# Patient Record
Sex: MALE | Race: WHITE | NOT HISPANIC OR LATINO | Employment: OTHER | URBAN - METROPOLITAN AREA
[De-identification: names, ages, dates, MRNs, and addresses within clinical notes are randomized per-mention and may not be internally consistent; named-entity substitution may affect disease eponyms.]

---

## 2023-04-11 DIAGNOSIS — F41.9 ANXIETY: Primary | ICD-10-CM

## 2023-04-11 DIAGNOSIS — E78.5 DYSLIPIDEMIA: Primary | ICD-10-CM

## 2023-04-11 DIAGNOSIS — I10 PRIMARY HYPERTENSION: ICD-10-CM

## 2023-04-11 RX ORDER — FUROSEMIDE 40 MG/1
TABLET ORAL
Qty: 30 TABLET | Refills: 3 | Status: SHIPPED | OUTPATIENT
Start: 2023-04-11 | End: 2023-10-06 | Stop reason: ALTCHOICE

## 2023-04-11 RX ORDER — CITALOPRAM 40 MG/1
1 TABLET, FILM COATED ORAL DAILY
COMMUNITY
Start: 2021-03-04 | End: 2023-11-09 | Stop reason: SDUPTHER

## 2023-04-11 RX ORDER — SIMVASTATIN 20 MG/1
TABLET, FILM COATED ORAL
Qty: 90 TABLET | Refills: 3 | Status: SHIPPED | OUTPATIENT
Start: 2023-04-11 | End: 2023-12-14 | Stop reason: HOSPADM

## 2023-04-11 RX ORDER — CITALOPRAM 40 MG/1
TABLET, FILM COATED ORAL
Qty: 90 TABLET | Refills: 3 | Status: SHIPPED | OUTPATIENT
Start: 2023-04-11 | End: 2023-10-06 | Stop reason: ALTCHOICE

## 2023-04-11 RX ORDER — FUROSEMIDE 40 MG/1
40 TABLET ORAL
COMMUNITY
Start: 2021-11-09

## 2023-04-20 ENCOUNTER — OFFICE VISIT (OUTPATIENT)
Dept: PRIMARY CARE | Facility: CLINIC | Age: 88
End: 2023-04-20
Payer: MEDICARE

## 2023-04-20 VITALS
SYSTOLIC BLOOD PRESSURE: 124 MMHG | DIASTOLIC BLOOD PRESSURE: 84 MMHG | BODY MASS INDEX: 36.98 KG/M2 | WEIGHT: 273 LBS | HEIGHT: 72 IN | HEART RATE: 62 BPM | TEMPERATURE: 96.8 F | OXYGEN SATURATION: 99 %

## 2023-04-20 DIAGNOSIS — R60.0 BILATERAL LOWER EXTREMITY EDEMA: ICD-10-CM

## 2023-04-20 DIAGNOSIS — R53.82 CHRONIC FATIGUE: ICD-10-CM

## 2023-04-20 DIAGNOSIS — G37.9 DEMYELINATING DISEASE (MULTI): ICD-10-CM

## 2023-04-20 DIAGNOSIS — R53.1 WEAKNESS: ICD-10-CM

## 2023-04-20 DIAGNOSIS — I50.32 CHRONIC HEART FAILURE WITH PRESERVED EJECTION FRACTION (MULTI): ICD-10-CM

## 2023-04-20 DIAGNOSIS — E78.5 DYSLIPIDEMIA: ICD-10-CM

## 2023-04-20 DIAGNOSIS — J30.2 SEASONAL ALLERGIES: Primary | ICD-10-CM

## 2023-04-20 PROBLEM — R53.83 FATIGUE: Status: ACTIVE | Noted: 2023-04-20

## 2023-04-20 PROBLEM — H91.90 HEARING LOSS: Status: ACTIVE | Noted: 2023-04-20

## 2023-04-20 PROBLEM — I50.30 (HFPEF) HEART FAILURE WITH PRESERVED EJECTION FRACTION (MULTI): Status: ACTIVE | Noted: 2023-04-20

## 2023-04-20 PROBLEM — I48.92 ATRIAL FLUTTER (MULTI): Status: ACTIVE | Noted: 2023-04-20

## 2023-04-20 PROBLEM — R73.9 CHRONIC HYPERGLYCEMIA: Status: ACTIVE | Noted: 2023-04-20

## 2023-04-20 PROBLEM — F33.9 EPISODE OF RECURRENT MAJOR DEPRESSIVE DISORDER (CMS-HCC): Status: ACTIVE | Noted: 2023-04-20

## 2023-04-20 PROBLEM — R26.89 BALANCE PROBLEM: Status: ACTIVE | Noted: 2023-04-20

## 2023-04-20 PROCEDURE — 99214 OFFICE O/P EST MOD 30 MIN: CPT | Performed by: FAMILY MEDICINE

## 2023-04-20 RX ORDER — DILTIAZEM HYDROCHLORIDE 180 MG/1
1 TABLET, EXTENDED RELEASE ORAL DAILY
COMMUNITY
Start: 2022-03-28 | End: 2023-12-07 | Stop reason: ENTERED-IN-ERROR

## 2023-04-20 RX ORDER — ACETAMINOPHEN 500 MG
1 TABLET ORAL DAILY
Status: ON HOLD | COMMUNITY
End: 2023-12-08 | Stop reason: WASHOUT

## 2023-04-20 RX ORDER — GABAPENTIN 300 MG/1
1 CAPSULE ORAL 2 TIMES DAILY
COMMUNITY
Start: 2021-12-06 | End: 2023-11-09 | Stop reason: SDUPTHER

## 2023-04-20 RX ORDER — WARFARIN 2.5 MG/1
TABLET ORAL
COMMUNITY
Start: 2022-08-04 | End: 2023-10-06 | Stop reason: SDUPTHER

## 2023-04-20 RX ORDER — TRIAMCINOLONE ACETONIDE 40 MG/ML
60 INJECTION, SUSPENSION INTRA-ARTICULAR; INTRAMUSCULAR ONCE
Status: DISCONTINUED | OUTPATIENT
Start: 2023-04-20 | End: 2023-05-02

## 2023-04-20 RX ORDER — BUPROPION HYDROCHLORIDE 150 MG/1
1 TABLET ORAL DAILY
COMMUNITY
Start: 2022-04-14 | End: 2023-06-13

## 2023-04-20 ASSESSMENT — PAIN SCALES - GENERAL: PAINLEVEL: 0-NO PAIN

## 2023-04-20 NOTE — PROGRESS NOTES
Subjective   Patient ID: Melecio Whaley is a 87 y.o. male.    Patient comes in with his wife.  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  And deconditioning.  They would like a wheelchair.  They also would like a urinal he can use in bed because he is too weak to get out during the night and go.  He is due for basic labs.  He has significant seasonal allergies with a clear drippy nose and itchy eyes.  Over-the-counter's do not help and they have side effects.        Review of Systems   Constitutional:  Negative for fatigue, fever and unexpected weight change.   HENT:  Positive for postnasal drip, rhinorrhea, sinus pressure and sneezing. Negative for congestion, ear pain, hearing loss, sore throat and trouble swallowing.    Eyes:  Negative for pain and visual disturbance.   Respiratory:  Negative for cough and shortness of breath.    Cardiovascular:  Negative for chest pain, palpitations and leg swelling.   Gastrointestinal:  Negative for abdominal pain, blood in stool, diarrhea, nausea and vomiting.   Genitourinary:  Negative for dysuria, frequency, hematuria and urgency.   Musculoskeletal:  Negative for joint swelling.   Skin:  Negative for pallor and rash.   Neurological:  Positive for weakness. Negative for dizziness, syncope, numbness and headaches.   Psychiatric/Behavioral:  Negative for confusion, decreased concentration, hallucinations and suicidal ideas.      Vitals:    04/20/23 1355   BP: 124/84   Pulse: 62   Temp: 36 °C (96.8 °F)   SpO2: 99%      Objective   Physical Exam  Constitutional:       Appearance: Normal appearance. He is obese.   Cardiovascular:      Rate and Rhythm: Normal rate and regular rhythm.      Heart sounds: Normal heart sounds.   Pulmonary:      Effort: Pulmonary effort is normal.      Breath sounds: Normal breath sounds.   Musculoskeletal:      Cervical back: Neck supple.      Right lower leg: Edema  present.      Left lower leg: Edema present.   Skin:     General: Skin is warm and dry.   Neurological:      General: No focal deficit present.      Mental Status: He is alert.      Motor: Weakness present.      Gait: Gait abnormal.   Psychiatric:         Mood and Affect: Mood normal.         Speech: Speech normal.         Behavior: Behavior normal.         Cognition and Memory: Cognition normal.         Assessment/Plan   There are no diagnoses linked to this encounter.

## 2023-05-01 ASSESSMENT — ENCOUNTER SYMPTOMS
UNEXPECTED WEIGHT CHANGE: 0
DIZZINESS: 0
PALPITATIONS: 0
CONFUSION: 0
HALLUCINATIONS: 0
SINUS PRESSURE: 1
DECREASED CONCENTRATION: 0
SORE THROAT: 0
ABDOMINAL PAIN: 0
COUGH: 0
NAUSEA: 0
JOINT SWELLING: 0
DYSURIA: 0
EYE PAIN: 0
SHORTNESS OF BREATH: 0
FATIGUE: 0
WEAKNESS: 1
RHINORRHEA: 1
FEVER: 0
VOMITING: 0
BLOOD IN STOOL: 0
HEADACHES: 0
FREQUENCY: 0
DIARRHEA: 0
TROUBLE SWALLOWING: 0
HEMATURIA: 0
NUMBNESS: 0

## 2023-05-01 NOTE — PATIENT INSTRUCTIONS
Reviewed current issues.  I feel he would benefit from a wheelchair as he is a very high fall risk.  We also discussed keeping his balance good and keeping strong.  I highly recommend he do physical therapy and work on his strength at home as well.  We will get him a urinal so he does not get out of bed and fall during the night.  We will give him a Kenalog shot to hopefully help his allergies since I do not want him on over-the-counter medications due to side effects.  We will follow-up in 3 months to assess his improvement.  Get fasting labs.

## 2023-05-04 ENCOUNTER — LAB (OUTPATIENT)
Dept: LAB | Facility: LAB | Age: 88
End: 2023-05-04
Payer: MEDICARE

## 2023-05-04 DIAGNOSIS — E78.5 DYSLIPIDEMIA: ICD-10-CM

## 2023-05-04 DIAGNOSIS — J30.2 SEASONAL ALLERGIES: ICD-10-CM

## 2023-05-04 LAB
ALANINE AMINOTRANSFERASE (SGPT) (U/L) IN SER/PLAS: 10 U/L (ref 10–52)
ALBUMIN (G/DL) IN SER/PLAS: 3.5 G/DL (ref 3.4–5)
ALKALINE PHOSPHATASE (U/L) IN SER/PLAS: 52 U/L (ref 33–136)
ANION GAP IN SER/PLAS: 13 MMOL/L (ref 10–20)
ASPARTATE AMINOTRANSFERASE (SGOT) (U/L) IN SER/PLAS: 15 U/L (ref 9–39)
BASOPHILS (10*3/UL) IN BLOOD BY AUTOMATED COUNT: 0.07 X10E9/L (ref 0–0.1)
BASOPHILS/100 LEUKOCYTES IN BLOOD BY AUTOMATED COUNT: 0.7 % (ref 0–2)
BILIRUBIN TOTAL (MG/DL) IN SER/PLAS: 0.7 MG/DL (ref 0–1.2)
CALCIUM (MG/DL) IN SER/PLAS: 8.6 MG/DL (ref 8.6–10.3)
CARBON DIOXIDE, TOTAL (MMOL/L) IN SER/PLAS: 28 MMOL/L (ref 21–32)
CHLORIDE (MMOL/L) IN SER/PLAS: 104 MMOL/L (ref 98–107)
CHOLESTEROL (MG/DL) IN SER/PLAS: 134 MG/DL (ref 0–199)
CHOLESTEROL IN HDL (MG/DL) IN SER/PLAS: 37.5 MG/DL
CHOLESTEROL/HDL RATIO: 3.6
CREATININE (MG/DL) IN SER/PLAS: 1.44 MG/DL (ref 0.5–1.3)
EOSINOPHILS (10*3/UL) IN BLOOD BY AUTOMATED COUNT: 0.32 X10E9/L (ref 0–0.4)
EOSINOPHILS/100 LEUKOCYTES IN BLOOD BY AUTOMATED COUNT: 3.3 % (ref 0–6)
ERYTHROCYTE DISTRIBUTION WIDTH (RATIO) BY AUTOMATED COUNT: 15.7 % (ref 11.5–14.5)
ERYTHROCYTE MEAN CORPUSCULAR HEMOGLOBIN CONCENTRATION (G/DL) BY AUTOMATED: 31.2 G/DL (ref 32–36)
ERYTHROCYTE MEAN CORPUSCULAR VOLUME (FL) BY AUTOMATED COUNT: 96 FL (ref 80–100)
ERYTHROCYTES (10*6/UL) IN BLOOD BY AUTOMATED COUNT: 5.33 X10E12/L (ref 4.5–5.9)
GFR MALE: 48 ML/MIN/1.73M2
GLUCOSE (MG/DL) IN SER/PLAS: 78 MG/DL (ref 74–99)
HEMATOCRIT (%) IN BLOOD BY AUTOMATED COUNT: 51 % (ref 41–52)
HEMOGLOBIN (G/DL) IN BLOOD: 15.9 G/DL (ref 13.5–17.5)
IMMATURE GRANULOCYTES/100 LEUKOCYTES IN BLOOD BY AUTOMATED COUNT: 0.5 % (ref 0–0.9)
LDL: 82 MG/DL (ref 0–99)
LEUKOCYTES (10*3/UL) IN BLOOD BY AUTOMATED COUNT: 9.7 X10E9/L (ref 4.4–11.3)
LYMPHOCYTES (10*3/UL) IN BLOOD BY AUTOMATED COUNT: 0.89 X10E9/L (ref 0.8–3)
LYMPHOCYTES/100 LEUKOCYTES IN BLOOD BY AUTOMATED COUNT: 9.2 % (ref 13–44)
MONOCYTES (10*3/UL) IN BLOOD BY AUTOMATED COUNT: 0.8 X10E9/L (ref 0.05–0.8)
MONOCYTES/100 LEUKOCYTES IN BLOOD BY AUTOMATED COUNT: 8.3 % (ref 2–10)
NEUTROPHILS (10*3/UL) IN BLOOD BY AUTOMATED COUNT: 7.56 X10E9/L (ref 1.6–5.5)
NEUTROPHILS/100 LEUKOCYTES IN BLOOD BY AUTOMATED COUNT: 78 % (ref 40–80)
PLATELETS (10*3/UL) IN BLOOD AUTOMATED COUNT: 247 X10E9/L (ref 150–450)
POTASSIUM (MMOL/L) IN SER/PLAS: 4.6 MMOL/L (ref 3.5–5.3)
PROTEIN TOTAL: 6.2 G/DL (ref 6.4–8.2)
SODIUM (MMOL/L) IN SER/PLAS: 140 MMOL/L (ref 136–145)
TRIGLYCERIDE (MG/DL) IN SER/PLAS: 71 MG/DL (ref 0–149)
UREA NITROGEN (MG/DL) IN SER/PLAS: 21 MG/DL (ref 6–23)
VLDL: 14 MG/DL (ref 0–40)

## 2023-05-04 PROCEDURE — 36415 COLL VENOUS BLD VENIPUNCTURE: CPT

## 2023-05-04 PROCEDURE — 85025 COMPLETE CBC W/AUTO DIFF WBC: CPT

## 2023-05-04 PROCEDURE — 80061 LIPID PANEL: CPT

## 2023-05-04 PROCEDURE — 80053 COMPREHEN METABOLIC PANEL: CPT

## 2023-06-10 DIAGNOSIS — I48.92 UNSPECIFIED ATRIAL FLUTTER (MULTI): ICD-10-CM

## 2023-06-12 PROBLEM — I48.0 PAROXYSMAL ATRIAL FIBRILLATION (MULTI): Status: ACTIVE | Noted: 2023-06-12

## 2023-06-12 PROBLEM — E66.9 OBESITY, CLASS II, BMI 35-39.9: Status: ACTIVE | Noted: 2020-07-16

## 2023-06-12 PROBLEM — S31.819A WOUND OF RIGHT BUTTOCK: Status: ACTIVE | Noted: 2023-06-12

## 2023-06-12 PROBLEM — N39.41 URGE INCONTINENCE OF URINE: Status: ACTIVE | Noted: 2017-08-03

## 2023-06-12 PROBLEM — G47.30 SLEEP APNEA: Status: ACTIVE | Noted: 2017-08-03

## 2023-06-12 PROBLEM — R32 INCONTINENCE OF URINE: Status: ACTIVE | Noted: 2023-06-12

## 2023-06-12 PROBLEM — R06.01 ORTHOPNEA: Status: ACTIVE | Noted: 2023-06-12

## 2023-06-12 PROBLEM — K92.1 MELENA: Status: ACTIVE | Noted: 2023-06-12

## 2023-06-12 PROBLEM — C44.90 MALIGNANT NEOPLASM OF SKIN: Status: ACTIVE | Noted: 2017-08-01

## 2023-06-12 PROBLEM — E78.5 HYPERLIPIDEMIA: Status: ACTIVE | Noted: 2017-08-03

## 2023-06-12 PROBLEM — G70.00 MYASTHENIA GRAVIS (MULTI): Status: ACTIVE | Noted: 2020-07-16

## 2023-06-12 PROBLEM — Z85.46 HISTORY OF PROSTATE CANCER: Status: ACTIVE | Noted: 2020-07-16

## 2023-06-12 PROBLEM — R41.81 AGE-RELATED COGNITIVE DECLINE: Status: ACTIVE | Noted: 2021-03-04

## 2023-06-12 PROBLEM — R15.9 INCONTINENCE OF FECES: Status: ACTIVE | Noted: 2021-03-04

## 2023-06-12 PROBLEM — R60.0 PERIPHERAL EDEMA: Status: ACTIVE | Noted: 2020-07-16

## 2023-06-12 PROBLEM — F41.9 ANXIETY: Status: ACTIVE | Noted: 2017-08-03

## 2023-06-12 PROBLEM — R06.09 DYSPNEA ON EXERTION: Status: ACTIVE | Noted: 2023-06-12

## 2023-06-12 PROBLEM — D49.59 NEOPLASM OF PROSTATE: Status: ACTIVE | Noted: 2017-08-03

## 2023-06-12 PROBLEM — R16.1 SPLENOMEGALY: Status: ACTIVE | Noted: 2023-06-12

## 2023-06-12 PROBLEM — I10 HYPERTENSION: Status: ACTIVE | Noted: 2017-08-03

## 2023-06-12 PROBLEM — R19.7 DIARRHEA: Status: ACTIVE | Noted: 2021-03-04

## 2023-06-12 PROBLEM — E66.812 OBESITY, CLASS II, BMI 35-39.9: Status: ACTIVE | Noted: 2020-07-16

## 2023-06-12 PROBLEM — E55.9 VITAMIN D DEFICIENCY: Status: ACTIVE | Noted: 2017-08-03

## 2023-06-12 PROBLEM — R60.9 PERIPHERAL EDEMA: Status: ACTIVE | Noted: 2020-07-16

## 2023-06-12 PROBLEM — G60.9 IDIOPATHIC PERIPHERAL NEUROPATHY: Status: ACTIVE | Noted: 2023-06-12

## 2023-06-12 PROBLEM — R26.81 UNSTEADY GAIT: Status: ACTIVE | Noted: 2020-07-16

## 2023-06-12 PROBLEM — R10.12 INTERMITTENT LEFT UPPER QUADRANT ABDOMINAL PAIN: Status: ACTIVE | Noted: 2023-06-12

## 2023-06-12 PROBLEM — C61 PROSTATE CANCER (MULTI): Status: ACTIVE | Noted: 2023-06-12

## 2023-06-12 PROBLEM — G62.9 NEUROPATHY: Status: ACTIVE | Noted: 2017-08-03

## 2023-06-12 PROBLEM — R29.6 RECURRENT FALLS: Status: ACTIVE | Noted: 2020-07-16

## 2023-06-12 PROBLEM — I48.19 PERSISTENT ATRIAL FIBRILLATION (MULTI): Status: ACTIVE | Noted: 2017-08-03

## 2023-06-12 PROBLEM — G37.9 DEMYELINATING DISEASE OF CENTRAL NERVOUS SYSTEM (MULTI): Status: ACTIVE | Noted: 2021-05-25

## 2023-06-12 PROBLEM — Z85.828 HISTORY OF SQUAMOUS CELL CARCINOMA OF SKIN: Status: ACTIVE | Noted: 2020-07-16

## 2023-06-12 PROBLEM — R06.02 SHORTNESS OF BREATH ON EXERTION: Status: ACTIVE | Noted: 2023-06-12

## 2023-06-12 RX ORDER — UBIDECARENONE 75 MG
1000 CAPSULE ORAL DAILY
COMMUNITY

## 2023-06-12 RX ORDER — OXYBUTYNIN CHLORIDE 5 MG/1
10 TABLET ORAL 2 TIMES DAILY
COMMUNITY
Start: 2017-06-13 | End: 2023-12-14 | Stop reason: HOSPADM

## 2023-06-12 RX ORDER — WARFARIN SODIUM 5 MG/1
TABLET ORAL
COMMUNITY
Start: 2017-07-09 | End: 2023-10-06 | Stop reason: SDUPTHER

## 2023-06-12 RX ORDER — DILTIAZEM HYDROCHLORIDE EXTENDED-RELEASE TABLETS 240 MG/1
240 TABLET, EXTENDED RELEASE ORAL
COMMUNITY
Start: 2021-05-19 | End: 2023-12-07 | Stop reason: ENTERED-IN-ERROR

## 2023-06-12 RX ORDER — ACETAMINOPHEN 500 MG
1000 TABLET ORAL 2 TIMES DAILY
Status: ON HOLD | COMMUNITY
Start: 2021-06-15 | End: 2023-12-08 | Stop reason: WASHOUT

## 2023-06-12 RX ORDER — ACETAMINOPHEN 500 MG
1 TABLET ORAL DAILY
COMMUNITY
End: 2023-10-06 | Stop reason: ALTCHOICE

## 2023-06-12 RX ORDER — DOFETILIDE 0.5 MG/1
500 CAPSULE ORAL DAILY
Status: ON HOLD | COMMUNITY
Start: 2021-05-19 | End: 2023-12-11 | Stop reason: WASHOUT

## 2023-06-12 RX ORDER — ALPRAZOLAM 1 MG/1
1 TABLET ORAL 3 TIMES DAILY PRN
Status: ON HOLD | COMMUNITY
End: 2023-12-08 | Stop reason: WASHOUT

## 2023-06-13 ENCOUNTER — OFFICE VISIT (OUTPATIENT)
Dept: PRIMARY CARE | Facility: CLINIC | Age: 88
End: 2023-06-13
Payer: MEDICARE

## 2023-06-13 VITALS
TEMPERATURE: 97.3 F | WEIGHT: 242 LBS | HEART RATE: 63 BPM | OXYGEN SATURATION: 98 % | BODY MASS INDEX: 32.78 KG/M2 | SYSTOLIC BLOOD PRESSURE: 130 MMHG | HEIGHT: 72 IN | DIASTOLIC BLOOD PRESSURE: 70 MMHG

## 2023-06-13 DIAGNOSIS — R60.0 BILATERAL LOWER EXTREMITY EDEMA: ICD-10-CM

## 2023-06-13 DIAGNOSIS — I48.0 PAROXYSMAL ATRIAL FIBRILLATION (MULTI): ICD-10-CM

## 2023-06-13 DIAGNOSIS — C61 PROSTATE CANCER (MULTI): ICD-10-CM

## 2023-06-13 DIAGNOSIS — R53.1 WEAKNESS: ICD-10-CM

## 2023-06-13 DIAGNOSIS — F33.1 MODERATE EPISODE OF RECURRENT MAJOR DEPRESSIVE DISORDER (MULTI): ICD-10-CM

## 2023-06-13 DIAGNOSIS — I10 PRIMARY HYPERTENSION: Primary | ICD-10-CM

## 2023-06-13 DIAGNOSIS — R26.81 UNSTEADY GAIT: ICD-10-CM

## 2023-06-13 DIAGNOSIS — R60.9 PERIPHERAL EDEMA: ICD-10-CM

## 2023-06-13 DIAGNOSIS — N39.41 URGE INCONTINENCE OF URINE: ICD-10-CM

## 2023-06-13 DIAGNOSIS — R41.81 AGE-RELATED COGNITIVE DECLINE: ICD-10-CM

## 2023-06-13 PROCEDURE — 3078F DIAST BP <80 MM HG: CPT | Performed by: FAMILY MEDICINE

## 2023-06-13 PROCEDURE — 99214 OFFICE O/P EST MOD 30 MIN: CPT | Performed by: FAMILY MEDICINE

## 2023-06-13 PROCEDURE — 3075F SYST BP GE 130 - 139MM HG: CPT | Performed by: FAMILY MEDICINE

## 2023-06-13 RX ORDER — BUPROPION HYDROCHLORIDE 150 MG/1
TABLET ORAL
Qty: 90 TABLET | Refills: 3 | Status: SHIPPED | OUTPATIENT
Start: 2023-06-13 | End: 2023-12-14 | Stop reason: HOSPADM

## 2023-06-13 ASSESSMENT — PAIN SCALES - GENERAL: PAINLEVEL: 8

## 2023-06-13 NOTE — PATIENT INSTRUCTIONS
It was nice to see you today!  Discussed current concerns and addressed   Reviewed recent labs and diagnostics  Reviewed medications list  Continue to eat a healthy diet, exercise at least 3 times a week or more  Plan and follow up discussed  For any further information related to your condition, copy and paste or go to [email or web address]    Try voltaren gel, rub on knee  Put ACE wrap on it  Try ice not heat

## 2023-06-19 PROBLEM — D49.59 NEOPLASM OF PROSTATE: Status: RESOLVED | Noted: 2017-08-03 | Resolved: 2023-06-19

## 2023-06-19 PROBLEM — R60.9 PERIPHERAL EDEMA: Status: RESOLVED | Noted: 2020-07-16 | Resolved: 2023-06-19

## 2023-06-19 PROBLEM — R60.0 PERIPHERAL EDEMA: Status: RESOLVED | Noted: 2020-07-16 | Resolved: 2023-06-19

## 2023-06-19 PROBLEM — S31.819A WOUND OF RIGHT BUTTOCK: Status: RESOLVED | Noted: 2023-06-12 | Resolved: 2023-06-19

## 2023-06-19 PROBLEM — R32 INCONTINENCE OF URINE: Status: RESOLVED | Noted: 2023-06-12 | Resolved: 2023-06-19

## 2023-06-19 ASSESSMENT — ENCOUNTER SYMPTOMS
JOINT SWELLING: 0
FEVER: 0
ACTIVITY CHANGE: 1
EYE PAIN: 0
COUGH: 0
NAUSEA: 0
DYSURIA: 0
HEMATURIA: 0
ARTHRALGIAS: 1
BLOOD IN STOOL: 0
ABDOMINAL PAIN: 0
SORE THROAT: 0
FATIGUE: 1
DIARRHEA: 1
VOMITING: 0
FREQUENCY: 0
UNEXPECTED WEIGHT CHANGE: 0
DIZZINESS: 0
NERVOUS/ANXIOUS: 1
PALPITATIONS: 0
TROUBLE SWALLOWING: 0
HALLUCINATIONS: 0
CONFUSION: 1
HEADACHES: 0
DECREASED CONCENTRATION: 0
NUMBNESS: 0
WEAKNESS: 1
SHORTNESS OF BREATH: 0

## 2023-06-19 NOTE — PROGRESS NOTES
Subjective   Patient ID: Melecio Whaley is a 87 y.o. male.    Patient comes in for follow-up.  He also has a new hip pain.  His balance is off and we discussed getting him into physical therapy.  He has A-fib and hypertension.  He has dementia.  He has had a history of prostate cancer.  He is urinating well.  We reviewed medications and recent blood work.  He had mild renal insufficiency in the last few blood draws.  He does not drink enough water admittedly.  PSA less than a year ago was undetectable.        Review of Systems   Constitutional:  Positive for activity change and fatigue. Negative for fever and unexpected weight change.   HENT:  Positive for hearing loss. Negative for congestion, ear pain, sore throat and trouble swallowing.    Eyes:  Negative for pain and visual disturbance.   Respiratory:  Negative for cough and shortness of breath.    Cardiovascular:  Positive for leg swelling. Negative for chest pain and palpitations.   Gastrointestinal:  Positive for diarrhea. Negative for abdominal pain, blood in stool, nausea and vomiting.   Genitourinary:  Negative for dysuria, frequency, hematuria and urgency.   Musculoskeletal:  Positive for arthralgias. Negative for joint swelling.   Skin:  Negative for pallor and rash.   Neurological:  Positive for weakness. Negative for dizziness, syncope, numbness and headaches.   Psychiatric/Behavioral:  Positive for confusion. Negative for decreased concentration, hallucinations and suicidal ideas. The patient is nervous/anxious.      Vitals:    06/13/23 1503   BP: 130/70   Pulse: 63   Temp: 36.3 °C (97.3 °F)   SpO2: 98%      Objective   Physical Exam    Assessment/Plan   Diagnoses and all orders for this visit:  Primary hypertension  Paroxysmal atrial fibrillation (CMS/HCC)

## 2023-10-05 ENCOUNTER — TELEPHONE (OUTPATIENT)
Dept: PRIMARY CARE | Facility: CLINIC | Age: 88
End: 2023-10-05
Payer: MEDICARE

## 2023-10-05 NOTE — TELEPHONE ENCOUNTER
States Melecio is out of refills. Needing coumadin, if needing appt, will need to call his wife-Jemma [phone]    Jemma stats he has a 5mg bottle that has him taking 1 tablet on Tues, Thur, Sat, Sun.     2mg bottle states as directed but no days on them. Patient is out of both bottles.     Please advise how you want him to take it.

## 2023-10-06 DIAGNOSIS — I48.3 TYPICAL ATRIAL FLUTTER (MULTI): Primary | ICD-10-CM

## 2023-10-06 RX ORDER — WARFARIN 2.5 MG/1
TABLET ORAL
Qty: 24 TABLET | Refills: 3 | Status: SHIPPED | OUTPATIENT
Start: 2023-10-06 | End: 2023-11-09 | Stop reason: SDUPTHER

## 2023-10-06 RX ORDER — WARFARIN SODIUM 5 MG/1
TABLET ORAL
Qty: 48 TABLET | Refills: 3 | Status: SHIPPED | OUTPATIENT
Start: 2023-10-06 | End: 2023-11-10 | Stop reason: SDUPTHER

## 2023-10-06 NOTE — TELEPHONE ENCOUNTER
Wife made aware, Dr. Taylor called in both doses with how to take them. Please keep in a pill organizer. Recheck INR in one week

## 2023-10-06 NOTE — PROGRESS NOTES
Subjective   Patient ID: Melecio Whaley is a 87 y.o. male.    HPI    Review of Systems  There were no vitals filed for this visit.   Objective   Physical Exam    Assessment/Plan   There are no diagnoses linked to this encounter.

## 2023-10-30 ENCOUNTER — APPOINTMENT (OUTPATIENT)
Dept: PRIMARY CARE | Facility: CLINIC | Age: 88
End: 2023-10-30
Payer: MEDICARE

## 2023-11-09 ENCOUNTER — OFFICE VISIT (OUTPATIENT)
Dept: PRIMARY CARE | Facility: CLINIC | Age: 88
End: 2023-11-09
Payer: MEDICARE

## 2023-11-09 ENCOUNTER — LAB (OUTPATIENT)
Dept: LAB | Facility: LAB | Age: 88
End: 2023-11-09
Payer: MEDICARE

## 2023-11-09 VITALS
TEMPERATURE: 96.8 F | OXYGEN SATURATION: 97 % | DIASTOLIC BLOOD PRESSURE: 98 MMHG | SYSTOLIC BLOOD PRESSURE: 142 MMHG | HEART RATE: 77 BPM

## 2023-11-09 DIAGNOSIS — I48.4 ATYPICAL ATRIAL FLUTTER (MULTI): ICD-10-CM

## 2023-11-09 DIAGNOSIS — I48.19 PERSISTENT ATRIAL FIBRILLATION (MULTI): Primary | ICD-10-CM

## 2023-11-09 DIAGNOSIS — I50.32 CHRONIC HEART FAILURE WITH PRESERVED EJECTION FRACTION (MULTI): ICD-10-CM

## 2023-11-09 DIAGNOSIS — R41.81 AGE-RELATED COGNITIVE DECLINE: ICD-10-CM

## 2023-11-09 DIAGNOSIS — F33.1 MODERATE EPISODE OF RECURRENT MAJOR DEPRESSIVE DISORDER (MULTI): ICD-10-CM

## 2023-11-09 DIAGNOSIS — I48.3 TYPICAL ATRIAL FLUTTER (MULTI): ICD-10-CM

## 2023-11-09 DIAGNOSIS — I48.19 PERSISTENT ATRIAL FIBRILLATION (MULTI): ICD-10-CM

## 2023-11-09 PROBLEM — G37.9 DEMYELINATING DISEASE OF CENTRAL NERVOUS SYSTEM (MULTI): Status: RESOLVED | Noted: 2021-05-25 | Resolved: 2023-11-09

## 2023-11-09 PROBLEM — G70.00 MYASTHENIA GRAVIS (MULTI): Status: RESOLVED | Noted: 2020-07-16 | Resolved: 2023-11-09

## 2023-11-09 PROBLEM — I48.0 PAROXYSMAL ATRIAL FIBRILLATION (MULTI): Status: RESOLVED | Noted: 2023-06-12 | Resolved: 2023-11-09

## 2023-11-09 PROBLEM — C61 MALIGNANT NEOPLASM OF PROSTATE (MULTI): Status: ACTIVE | Noted: 2021-05-25

## 2023-11-09 LAB
ALBUMIN SERPL BCP-MCNC: 3.4 G/DL (ref 3.4–5)
ALP SERPL-CCNC: 54 U/L (ref 33–136)
ALT SERPL W P-5'-P-CCNC: 12 U/L (ref 10–52)
ANION GAP SERPL CALC-SCNC: 14 MMOL/L (ref 10–20)
AST SERPL W P-5'-P-CCNC: 16 U/L (ref 9–39)
BASOPHILS # BLD AUTO: 0.07 X10*3/UL (ref 0–0.1)
BASOPHILS NFR BLD AUTO: 0.7 %
BILIRUB SERPL-MCNC: 0.7 MG/DL (ref 0–1.2)
BUN SERPL-MCNC: 29 MG/DL (ref 6–23)
CALCIUM SERPL-MCNC: 8.7 MG/DL (ref 8.6–10.3)
CHLORIDE SERPL-SCNC: 104 MMOL/L (ref 98–107)
CO2 SERPL-SCNC: 28 MMOL/L (ref 21–32)
CREAT SERPL-MCNC: 0.96 MG/DL (ref 0.5–1.3)
EOSINOPHIL # BLD AUTO: 0.24 X10*3/UL (ref 0–0.4)
EOSINOPHIL NFR BLD AUTO: 2.5 %
ERYTHROCYTE [DISTWIDTH] IN BLOOD BY AUTOMATED COUNT: 15.9 % (ref 11.5–14.5)
GFR SERPL CREATININE-BSD FRML MDRD: 77 ML/MIN/1.73M*2
GLUCOSE SERPL-MCNC: 68 MG/DL (ref 74–99)
HCT VFR BLD AUTO: 48.1 % (ref 41–52)
HGB BLD-MCNC: 14.9 G/DL (ref 13.5–17.5)
IMM GRANULOCYTES # BLD AUTO: 0.03 X10*3/UL (ref 0–0.5)
IMM GRANULOCYTES NFR BLD AUTO: 0.3 % (ref 0–0.9)
INR PPP: 1.6 (ref 0.9–1.1)
LYMPHOCYTES # BLD AUTO: 0.83 X10*3/UL (ref 0.8–3)
LYMPHOCYTES NFR BLD AUTO: 8.5 %
MCH RBC QN AUTO: 30 PG (ref 26–34)
MCHC RBC AUTO-ENTMCNC: 31 G/DL (ref 32–36)
MCV RBC AUTO: 97 FL (ref 80–100)
MONOCYTES # BLD AUTO: 0.74 X10*3/UL (ref 0.05–0.8)
MONOCYTES NFR BLD AUTO: 7.6 %
NEUTROPHILS # BLD AUTO: 7.83 X10*3/UL (ref 1.6–5.5)
NEUTROPHILS NFR BLD AUTO: 80.4 %
NRBC BLD-RTO: 0 /100 WBCS (ref 0–0)
PLATELET # BLD AUTO: 225 X10*3/UL (ref 150–450)
POTASSIUM SERPL-SCNC: 4.9 MMOL/L (ref 3.5–5.3)
PROT SERPL-MCNC: 6.4 G/DL (ref 6.4–8.2)
PROTHROMBIN TIME: 18.6 SECONDS (ref 9.8–12.8)
RBC # BLD AUTO: 4.97 X10*6/UL (ref 4.5–5.9)
SODIUM SERPL-SCNC: 141 MMOL/L (ref 136–145)
TSH SERPL-ACNC: 1.63 MIU/L (ref 0.44–3.98)
WBC # BLD AUTO: 9.7 X10*3/UL (ref 4.4–11.3)

## 2023-11-09 PROCEDURE — 80053 COMPREHEN METABOLIC PANEL: CPT

## 2023-11-09 PROCEDURE — 1159F MED LIST DOCD IN RCRD: CPT | Performed by: FAMILY MEDICINE

## 2023-11-09 PROCEDURE — 85025 COMPLETE CBC W/AUTO DIFF WBC: CPT

## 2023-11-09 PROCEDURE — 99214 OFFICE O/P EST MOD 30 MIN: CPT | Performed by: FAMILY MEDICINE

## 2023-11-09 PROCEDURE — 3080F DIAST BP >= 90 MM HG: CPT | Performed by: FAMILY MEDICINE

## 2023-11-09 PROCEDURE — 1125F AMNT PAIN NOTED PAIN PRSNT: CPT | Performed by: FAMILY MEDICINE

## 2023-11-09 PROCEDURE — 3077F SYST BP >= 140 MM HG: CPT | Performed by: FAMILY MEDICINE

## 2023-11-09 PROCEDURE — 84443 ASSAY THYROID STIM HORMONE: CPT

## 2023-11-09 PROCEDURE — 85610 PROTHROMBIN TIME: CPT

## 2023-11-09 PROCEDURE — 36415 COLL VENOUS BLD VENIPUNCTURE: CPT

## 2023-11-09 PROCEDURE — 83880 ASSAY OF NATRIURETIC PEPTIDE: CPT

## 2023-11-09 RX ORDER — CITALOPRAM 40 MG/1
40 TABLET, FILM COATED ORAL DAILY
Qty: 90 TABLET | Refills: 3 | Status: SHIPPED | OUTPATIENT
Start: 2023-11-09 | End: 2023-11-09 | Stop reason: SDUPTHER

## 2023-11-09 RX ORDER — GABAPENTIN 300 MG/1
300 CAPSULE ORAL 2 TIMES DAILY
Qty: 180 CAPSULE | Refills: 3 | Status: SHIPPED | OUTPATIENT
Start: 2023-11-09 | End: 2023-11-09 | Stop reason: SDUPTHER

## 2023-11-09 NOTE — PROGRESS NOTES
Subjective   Patient ID: Melecio Whaley is a 87 y.o. male.    Patient brought in by son.  He has multiple problems including history of heart failure with preserved ejection fraction, atrial fibrillation on warfarin, history of prostate cancer and depression.  There is some mention of MS when he was in his 20s but he said it went away.  Nonetheless he is here because his breathing has gotten a little worse.  He has been on his oxygen at home considerably more.  There is no fever, chills, cough or any other unusual symptoms.  He is with his son and they are very unclear about which medications he is actually on.  Blood pressure is elevated today.  He has no chest pain.  He has no palpitations.  To note, his wife is in a nursing facility after breaking her hip.  He Nathalie little upset over not seeing her.  He is in a wheelchair.  He has problems with mobility.  The children are trying to figure out a long-term plan with regards to assisted living versus nursing facility.  Patient does not smoke.        Review of Systems   Constitutional:  Positive for fatigue. Negative for fever and unexpected weight change.   HENT:  Negative for congestion, ear pain, hearing loss, sore throat and trouble swallowing.    Eyes:  Negative for pain and visual disturbance.   Respiratory:  Positive for shortness of breath. Negative for cough.         No orthopnea.  He does not weigh himself daily and the swelling in his legs has not changed.   Cardiovascular:  Positive for leg swelling. Negative for chest pain and palpitations.   Gastrointestinal:  Negative for abdominal pain, blood in stool, diarrhea, nausea and vomiting.   Genitourinary:  Negative for dysuria, frequency, hematuria and urgency.        He is incontinent   Musculoskeletal:  Positive for arthralgias and gait problem. Negative for joint swelling.   Skin:  Negative for pallor and rash.   Neurological:  Negative for dizziness, syncope, weakness, numbness and headaches.    Psychiatric/Behavioral:  Positive for dysphoric mood. Negative for confusion, decreased concentration, hallucinations and suicidal ideas.      Vitals:    11/09/23 1255   BP: (!) 142/98   Pulse: 77   Temp: 36 °C (96.8 °F)   SpO2: 97%      Objective   Physical Exam  Constitutional:       General: He is not in acute distress.     Appearance: He is obese. He is not ill-appearing or toxic-appearing.   HENT:      Head: Normocephalic and atraumatic.      Nose: No congestion.      Mouth/Throat:      Mouth: Mucous membranes are moist.      Pharynx: Oropharynx is clear.   Eyes:      Extraocular Movements: Extraocular movements intact.      Pupils: Pupils are equal, round, and reactive to light.   Cardiovascular:      Rate and Rhythm: Normal rate. Rhythm irregular.   Pulmonary:      Effort: Pulmonary effort is normal.      Breath sounds: Rales present.      Comments: Faint bibasilar Rales  Musculoskeletal:         General: No swelling.      Cervical back: Neck supple.      Right lower leg: Edema present.      Left lower leg: Edema present.   Skin:     General: Skin is warm and dry.   Neurological:      General: No focal deficit present.      Mental Status: He is alert.   Psychiatric:         Mood and Affect: Mood normal.         Speech: Speech normal.         Behavior: Behavior normal.         Cognition and Memory: Cognition normal.         Assessment/Plan   Diagnoses and all orders for this visit:  Persistent atrial fibrillation (CMS/HCC)  -     CBC and Auto Differential; Future  -     Comprehensive Metabolic Panel; Future  -     Thyroid Stimulating Hormone; Future  -     B-type natriuretic peptide; Future  Atypical atrial flutter (CMS/HCC)  -     CBC and Auto Differential; Future  -     Comprehensive Metabolic Panel; Future  -     Thyroid Stimulating Hormone; Future  -     B-type natriuretic peptide; Future  Chronic heart failure with preserved ejection fraction (CMS/HCC)  -     CBC and Auto Differential; Future  -      Comprehensive Metabolic Panel; Future  -     Thyroid Stimulating Hormone; Future  -     B-type natriuretic peptide; Future  Age-related cognitive decline  -     Comprehensive Metabolic Panel; Future  -     Thyroid Stimulating Hormone; Future  -     B-type natriuretic peptide; Future  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  -     Comprehensive Metabolic Panel; Future  -     Thyroid Stimulating Hormone; Future  -     B-type natriuretic peptide; Future

## 2023-11-10 LAB — BNP SERPL-MCNC: 389 PG/ML (ref 0–99)

## 2023-11-10 RX ORDER — WARFARIN 2.5 MG/1
TABLET ORAL
Qty: 24 TABLET | Refills: 3 | Status: SHIPPED | OUTPATIENT
Start: 2023-11-10 | End: 2023-12-14 | Stop reason: HOSPADM

## 2023-11-10 RX ORDER — CITALOPRAM 40 MG/1
40 TABLET, FILM COATED ORAL DAILY
Qty: 90 TABLET | Refills: 3 | Status: SHIPPED | OUTPATIENT
Start: 2023-11-10

## 2023-11-10 RX ORDER — GABAPENTIN 300 MG/1
300 CAPSULE ORAL 2 TIMES DAILY
Qty: 180 CAPSULE | Refills: 3 | Status: SHIPPED | OUTPATIENT
Start: 2023-11-10

## 2023-11-10 ASSESSMENT — ENCOUNTER SYMPTOMS
VOMITING: 0
JOINT SWELLING: 0
SORE THROAT: 0
TROUBLE SWALLOWING: 0
NUMBNESS: 0
DIZZINESS: 0
DYSPHORIC MOOD: 1
HEMATURIA: 0
CONFUSION: 0
FREQUENCY: 0
WEAKNESS: 0
DECREASED CONCENTRATION: 0
UNEXPECTED WEIGHT CHANGE: 0
DYSURIA: 0
NAUSEA: 0
BLOOD IN STOOL: 0
ABDOMINAL PAIN: 0
EYE PAIN: 0
HALLUCINATIONS: 0
FATIGUE: 1
PALPITATIONS: 0
ARTHRALGIAS: 1
DIARRHEA: 0
HEADACHES: 0
COUGH: 0
SHORTNESS OF BREATH: 1
FEVER: 0

## 2023-11-10 NOTE — PATIENT INSTRUCTIONS
It was nice to see you today!  Discussed current concerns and addressed   Reviewed recent labs and diagnostics  Reviewed medications list  Has not been taking the diuretic because of his incontinence issues and I highly encouraged him to take it at least a few times a week.  Daily weights if at all possible.  They may need to get into cardiology sooner.  Continue the oxygen.  I will check labs.  Had an honest discussion about his parents and placement he may need a skilled nursing facility.  I have asked the son to call me with the actual medicines he is taking.

## 2023-11-22 ENCOUNTER — TELEPHONE (OUTPATIENT)
Dept: PRIMARY CARE | Facility: CLINIC | Age: 88
End: 2023-11-22
Payer: MEDICARE

## 2023-11-22 NOTE — TELEPHONE ENCOUNTER
Requesting wheel chair script. Recent chart notes states mobility issues and that the patient is in a wheelchair. This was our wheelchair from the lobby. Daughter will  printed script. Pt is 6ft 242lbs. Not sure if needs noted on order.

## 2023-12-07 ENCOUNTER — APPOINTMENT (OUTPATIENT)
Dept: RADIOLOGY | Facility: HOSPITAL | Age: 88
DRG: 689 | End: 2023-12-07
Payer: MEDICARE

## 2023-12-07 ENCOUNTER — HOSPITAL ENCOUNTER (INPATIENT)
Facility: HOSPITAL | Age: 88
LOS: 7 days | Discharge: NURSING, REHAB OR LONG-TERM CARE | DRG: 689 | End: 2023-12-14
Attending: EMERGENCY MEDICINE | Admitting: INTERNAL MEDICINE
Payer: MEDICARE

## 2023-12-07 DIAGNOSIS — I48.19 PERSISTENT ATRIAL FIBRILLATION (MULTI): ICD-10-CM

## 2023-12-07 DIAGNOSIS — I48.3 TYPICAL ATRIAL FLUTTER (MULTI): ICD-10-CM

## 2023-12-07 DIAGNOSIS — N17.9 AKI (ACUTE KIDNEY INJURY) (CMS-HCC): ICD-10-CM

## 2023-12-07 DIAGNOSIS — I50.31 ACUTE DIASTOLIC CONGESTIVE HEART FAILURE (MULTI): ICD-10-CM

## 2023-12-07 DIAGNOSIS — N30.01 ACUTE CYSTITIS WITH HEMATURIA: ICD-10-CM

## 2023-12-07 DIAGNOSIS — E78.2 MIXED HYPERLIPIDEMIA: ICD-10-CM

## 2023-12-07 DIAGNOSIS — R41.82 ALTERED MENTAL STATUS, UNSPECIFIED ALTERED MENTAL STATUS TYPE: Primary | ICD-10-CM

## 2023-12-07 DIAGNOSIS — I50.41 ACUTE COMBINED SYSTOLIC (CONGESTIVE) AND DIASTOLIC (CONGESTIVE) HEART FAILURE (MULTI): ICD-10-CM

## 2023-12-07 DIAGNOSIS — G62.9 NEUROPATHY: ICD-10-CM

## 2023-12-07 LAB
ABO GROUP (TYPE) IN BLOOD: NORMAL
ALBUMIN SERPL BCP-MCNC: 3.5 G/DL (ref 3.4–5)
ALP SERPL-CCNC: 48 U/L (ref 33–136)
ALT SERPL W P-5'-P-CCNC: 12 U/L (ref 10–52)
AMORPH CRY #/AREA UR COMP ASSIST: ABNORMAL /HPF
AMPHETAMINES UR QL SCN: ABNORMAL
ANION GAP SERPL CALC-SCNC: 11 MMOL/L (ref 10–20)
ANTIBODY SCREEN: NORMAL
APPEARANCE UR: ABNORMAL
AST SERPL W P-5'-P-CCNC: 19 U/L (ref 9–39)
BACTERIA #/AREA URNS AUTO: ABNORMAL /HPF
BARBITURATES UR QL SCN: ABNORMAL
BASOPHILS # BLD AUTO: 0.06 X10*3/UL (ref 0–0.1)
BASOPHILS NFR BLD AUTO: 0.5 %
BENZODIAZ UR QL SCN: ABNORMAL
BILIRUB SERPL-MCNC: 0.5 MG/DL (ref 0–1.2)
BILIRUB UR STRIP.AUTO-MCNC: NEGATIVE MG/DL
BUN SERPL-MCNC: 39 MG/DL (ref 6–23)
BZE UR QL SCN: ABNORMAL
CALCIUM SERPL-MCNC: 8.8 MG/DL (ref 8.6–10.3)
CANNABINOIDS UR QL SCN: ABNORMAL
CAOX CRY #/AREA UR COMP ASSIST: ABNORMAL /HPF
CHLORIDE SERPL-SCNC: 102 MMOL/L (ref 98–107)
CO2 SERPL-SCNC: 31 MMOL/L (ref 21–32)
COLOR UR: ABNORMAL
CREAT SERPL-MCNC: 1.58 MG/DL (ref 0.5–1.3)
EOSINOPHIL # BLD AUTO: 0.27 X10*3/UL (ref 0–0.4)
EOSINOPHIL NFR BLD AUTO: 2.4 %
ERYTHROCYTE [DISTWIDTH] IN BLOOD BY AUTOMATED COUNT: 15.5 % (ref 11.5–14.5)
ETHANOL SERPL-MCNC: <10 MG/DL
FENTANYL+NORFENTANYL UR QL SCN: ABNORMAL
GFR SERPL CREATININE-BSD FRML MDRD: 43 ML/MIN/1.73M*2
GLUCOSE SERPL-MCNC: 103 MG/DL (ref 74–99)
GLUCOSE UR STRIP.AUTO-MCNC: NEGATIVE MG/DL
GRAN CASTS #/AREA UR COMP ASSIST: ABNORMAL /LPF
HCT VFR BLD AUTO: 43.6 % (ref 41–52)
HGB BLD-MCNC: 13.8 G/DL (ref 13.5–17.5)
IMM GRANULOCYTES # BLD AUTO: 0.05 X10*3/UL (ref 0–0.5)
IMM GRANULOCYTES NFR BLD AUTO: 0.4 % (ref 0–0.9)
INR PPP: 1.3 (ref 0.9–1.1)
KETONES UR STRIP.AUTO-MCNC: NEGATIVE MG/DL
LACTATE SERPL-SCNC: 1.7 MMOL/L (ref 0.4–2)
LEUKOCYTE ESTERASE UR QL STRIP.AUTO: ABNORMAL
LYMPHOCYTES # BLD AUTO: 0.83 X10*3/UL (ref 0.8–3)
LYMPHOCYTES NFR BLD AUTO: 7.4 %
MCH RBC QN AUTO: 30 PG (ref 26–34)
MCHC RBC AUTO-ENTMCNC: 31.7 G/DL (ref 32–36)
MCV RBC AUTO: 95 FL (ref 80–100)
MONOCYTES # BLD AUTO: 0.76 X10*3/UL (ref 0.05–0.8)
MONOCYTES NFR BLD AUTO: 6.8 %
NEUTROPHILS # BLD AUTO: 9.23 X10*3/UL (ref 1.6–5.5)
NEUTROPHILS NFR BLD AUTO: 82.5 %
NITRITE UR QL STRIP.AUTO: POSITIVE
NRBC BLD-RTO: 0 /100 WBCS (ref 0–0)
OPIATES UR QL SCN: ABNORMAL
OXYCODONE+OXYMORPHONE UR QL SCN: ABNORMAL
PCP UR QL SCN: ABNORMAL
PH UR STRIP.AUTO: 7 [PH]
PLATELET # BLD AUTO: 253 X10*3/UL (ref 150–450)
POTASSIUM SERPL-SCNC: 4.4 MMOL/L (ref 3.5–5.3)
PROT SERPL-MCNC: 6.7 G/DL (ref 6.4–8.2)
PROT UR STRIP.AUTO-MCNC: ABNORMAL MG/DL
PROTHROMBIN TIME: 14.3 SECONDS (ref 9.8–12.8)
RBC # BLD AUTO: 4.6 X10*6/UL (ref 4.5–5.9)
RBC # UR STRIP.AUTO: ABNORMAL /UL
RBC #/AREA URNS AUTO: >20 /HPF
RH FACTOR (ANTIGEN D): NORMAL
SODIUM SERPL-SCNC: 140 MMOL/L (ref 136–145)
SP GR UR STRIP.AUTO: 1.03
UROBILINOGEN UR STRIP.AUTO-MCNC: 2 MG/DL
WBC # BLD AUTO: 11.2 X10*3/UL (ref 4.4–11.3)
WBC #/AREA URNS AUTO: >50 /HPF

## 2023-12-07 PROCEDURE — G0390 TRAUMA RESPONS W/HOSP CRITI: HCPCS

## 2023-12-07 PROCEDURE — 96360 HYDRATION IV INFUSION INIT: CPT

## 2023-12-07 PROCEDURE — 99223 1ST HOSP IP/OBS HIGH 75: CPT | Performed by: NURSE PRACTITIONER

## 2023-12-07 PROCEDURE — 2500000004 HC RX 250 GENERAL PHARMACY W/ HCPCS (ALT 636 FOR OP/ED): Performed by: EMERGENCY MEDICINE

## 2023-12-07 PROCEDURE — 82077 ASSAY SPEC XCP UR&BREATH IA: CPT | Performed by: EMERGENCY MEDICINE

## 2023-12-07 PROCEDURE — 70450 CT HEAD/BRAIN W/O DYE: CPT

## 2023-12-07 PROCEDURE — 72125 CT NECK SPINE W/O DYE: CPT

## 2023-12-07 PROCEDURE — 83735 ASSAY OF MAGNESIUM: CPT | Performed by: NURSE PRACTITIONER

## 2023-12-07 PROCEDURE — 80307 DRUG TEST PRSMV CHEM ANLYZR: CPT | Performed by: EMERGENCY MEDICINE

## 2023-12-07 PROCEDURE — 85610 PROTHROMBIN TIME: CPT | Performed by: EMERGENCY MEDICINE

## 2023-12-07 PROCEDURE — 2500000004 HC RX 250 GENERAL PHARMACY W/ HCPCS (ALT 636 FOR OP/ED): Performed by: NURSE PRACTITIONER

## 2023-12-07 PROCEDURE — 84075 ASSAY ALKALINE PHOSPHATASE: CPT | Performed by: EMERGENCY MEDICINE

## 2023-12-07 PROCEDURE — 99291 CRITICAL CARE FIRST HOUR: CPT | Mod: 25 | Performed by: EMERGENCY MEDICINE

## 2023-12-07 PROCEDURE — 87086 URINE CULTURE/COLONY COUNT: CPT | Mod: GEALAB | Performed by: EMERGENCY MEDICINE

## 2023-12-07 PROCEDURE — 85025 COMPLETE CBC W/AUTO DIFF WBC: CPT | Performed by: EMERGENCY MEDICINE

## 2023-12-07 PROCEDURE — 1200000002 HC GENERAL ROOM WITH TELEMETRY DAILY

## 2023-12-07 PROCEDURE — 70450 CT HEAD/BRAIN W/O DYE: CPT | Performed by: STUDENT IN AN ORGANIZED HEALTH CARE EDUCATION/TRAINING PROGRAM

## 2023-12-07 PROCEDURE — 86900 BLOOD TYPING SEROLOGIC ABO: CPT | Performed by: EMERGENCY MEDICINE

## 2023-12-07 PROCEDURE — 36415 COLL VENOUS BLD VENIPUNCTURE: CPT | Performed by: EMERGENCY MEDICINE

## 2023-12-07 PROCEDURE — 83605 ASSAY OF LACTIC ACID: CPT | Performed by: EMERGENCY MEDICINE

## 2023-12-07 PROCEDURE — 72125 CT NECK SPINE W/O DYE: CPT | Performed by: STUDENT IN AN ORGANIZED HEALTH CARE EDUCATION/TRAINING PROGRAM

## 2023-12-07 PROCEDURE — 81001 URINALYSIS AUTO W/SCOPE: CPT | Performed by: EMERGENCY MEDICINE

## 2023-12-07 RX ORDER — TALC
3 POWDER (GRAM) TOPICAL DAILY
Status: DISCONTINUED | OUTPATIENT
Start: 2023-12-07 | End: 2023-12-14 | Stop reason: HOSPADM

## 2023-12-07 RX ORDER — BUPROPION HYDROCHLORIDE 150 MG/1
150 TABLET, EXTENDED RELEASE ORAL DAILY
Status: ON HOLD | COMMUNITY
End: 2023-12-08 | Stop reason: WASHOUT

## 2023-12-07 RX ORDER — ACETAMINOPHEN 325 MG/1
1000 TABLET ORAL 2 TIMES DAILY
Status: DISCONTINUED | OUTPATIENT
Start: 2023-12-07 | End: 2023-12-14 | Stop reason: HOSPADM

## 2023-12-07 RX ORDER — ONDANSETRON HYDROCHLORIDE 2 MG/ML
4 INJECTION, SOLUTION INTRAVENOUS EVERY 8 HOURS PRN
Status: DISCONTINUED | OUTPATIENT
Start: 2023-12-07 | End: 2023-12-07

## 2023-12-07 RX ORDER — SODIUM CHLORIDE 9 MG/ML
125 INJECTION, SOLUTION INTRAVENOUS CONTINUOUS
Status: DISCONTINUED | OUTPATIENT
Start: 2023-12-07 | End: 2023-12-08

## 2023-12-07 RX ORDER — SODIUM CHLORIDE 9 MG/ML
100 INJECTION, SOLUTION INTRAVENOUS CONTINUOUS
Status: DISCONTINUED | OUTPATIENT
Start: 2023-12-07 | End: 2023-12-14 | Stop reason: HOSPADM

## 2023-12-07 RX ORDER — ONDANSETRON 4 MG/1
4 TABLET, FILM COATED ORAL EVERY 8 HOURS PRN
Status: DISCONTINUED | OUTPATIENT
Start: 2023-12-07 | End: 2023-12-07

## 2023-12-07 RX ORDER — GABAPENTIN 300 MG/1
300 CAPSULE ORAL 2 TIMES DAILY
Status: DISCONTINUED | OUTPATIENT
Start: 2023-12-07 | End: 2023-12-08

## 2023-12-07 RX ORDER — WARFARIN SODIUM 5 MG/1
2.5 TABLET ORAL DAILY
Status: DISCONTINUED | OUTPATIENT
Start: 2023-12-08 | End: 2023-12-08

## 2023-12-07 RX ORDER — POLYETHYLENE GLYCOL 3350 17 G/17G
17 POWDER, FOR SOLUTION ORAL DAILY
Status: DISCONTINUED | OUTPATIENT
Start: 2023-12-08 | End: 2023-12-14 | Stop reason: HOSPADM

## 2023-12-07 RX ORDER — ACETAMINOPHEN 325 MG/1
650 TABLET ORAL EVERY 6 HOURS PRN
Status: DISCONTINUED | OUTPATIENT
Start: 2023-12-07 | End: 2023-12-14 | Stop reason: HOSPADM

## 2023-12-07 RX ORDER — ENOXAPARIN SODIUM 100 MG/ML
40 INJECTION SUBCUTANEOUS EVERY 24 HOURS
Status: DISCONTINUED | OUTPATIENT
Start: 2023-12-07 | End: 2023-12-07

## 2023-12-07 RX ORDER — DILTIAZEM HYDROCHLORIDE 240 MG/1
240 CAPSULE, EXTENDED RELEASE ORAL DAILY
Status: ON HOLD | COMMUNITY
End: 2023-12-11 | Stop reason: WASHOUT

## 2023-12-07 RX ORDER — DILTIAZEM HYDROCHLORIDE 180 MG/1
180 CAPSULE, EXTENDED RELEASE ORAL DAILY
Status: ON HOLD | COMMUNITY
End: 2023-12-11 | Stop reason: WASHOUT

## 2023-12-07 RX ORDER — SIMVASTATIN 20 MG/1
20 TABLET, FILM COATED ORAL DAILY
Status: DISCONTINUED | OUTPATIENT
Start: 2023-12-08 | End: 2023-12-08

## 2023-12-07 RX ORDER — CITALOPRAM 20 MG/1
40 TABLET, FILM COATED ORAL DAILY
Status: DISCONTINUED | OUTPATIENT
Start: 2023-12-08 | End: 2023-12-14 | Stop reason: HOSPADM

## 2023-12-07 RX ADMIN — SODIUM CHLORIDE 100 ML/HR: 9 INJECTION, SOLUTION INTRAVENOUS at 22:35

## 2023-12-07 RX ADMIN — SODIUM CHLORIDE 500 ML: 9 INJECTION, SOLUTION INTRAVENOUS at 20:51

## 2023-12-07 ASSESSMENT — COLUMBIA-SUICIDE SEVERITY RATING SCALE - C-SSRS
2. HAVE YOU ACTUALLY HAD ANY THOUGHTS OF KILLING YOURSELF?: NO
1. IN THE PAST MONTH, HAVE YOU WISHED YOU WERE DEAD OR WISHED YOU COULD GO TO SLEEP AND NOT WAKE UP?: NO
6. HAVE YOU EVER DONE ANYTHING, STARTED TO DO ANYTHING, OR PREPARED TO DO ANYTHING TO END YOUR LIFE?: NO

## 2023-12-07 ASSESSMENT — PAIN SCALES - GENERAL: PAINLEVEL_OUTOF10: 0 - NO PAIN

## 2023-12-07 ASSESSMENT — LIFESTYLE VARIABLES
HAVE PEOPLE ANNOYED YOU BY CRITICIZING YOUR DRINKING: NO
EVER FELT BAD OR GUILTY ABOUT YOUR DRINKING: NO
EVER HAD A DRINK FIRST THING IN THE MORNING TO STEADY YOUR NERVES TO GET RID OF A HANGOVER: NO
HAVE YOU EVER FELT YOU SHOULD CUT DOWN ON YOUR DRINKING: NO
REASON UNABLE TO ASSESS: NO

## 2023-12-07 ASSESSMENT — PAIN - FUNCTIONAL ASSESSMENT: PAIN_FUNCTIONAL_ASSESSMENT: 0-10

## 2023-12-08 ENCOUNTER — APPOINTMENT (OUTPATIENT)
Dept: CARDIOLOGY | Facility: HOSPITAL | Age: 88
DRG: 689 | End: 2023-12-08
Payer: MEDICARE

## 2023-12-08 ENCOUNTER — APPOINTMENT (OUTPATIENT)
Dept: RADIOLOGY | Facility: HOSPITAL | Age: 88
DRG: 689 | End: 2023-12-08
Payer: MEDICARE

## 2023-12-08 LAB
ANION GAP SERPL CALC-SCNC: 12 MMOL/L (ref 10–20)
AORTIC VALVE MEAN GRADIENT: 12.3
AORTIC VALVE PEAK VELOCITY: 2.12
AV PEAK GRADIENT: 18
AVA (PEAK VEL): 1.13
AVA (VTI): 1.05
BNP SERPL-MCNC: 261 PG/ML (ref 0–99)
BUN SERPL-MCNC: 36 MG/DL (ref 6–23)
CALCIUM SERPL-MCNC: 8.6 MG/DL (ref 8.6–10.3)
CHLORIDE SERPL-SCNC: 105 MMOL/L (ref 98–107)
CHOLEST SERPL-MCNC: 104 MG/DL (ref 0–199)
CHOLESTEROL/HDL RATIO: 2.8
CO2 SERPL-SCNC: 28 MMOL/L (ref 21–32)
CREAT SERPL-MCNC: 1.32 MG/DL (ref 0.5–1.3)
EJECTION FRACTION APICAL 4 CHAMBER: 61.1
EJECTION FRACTION: 60
ERYTHROCYTE [DISTWIDTH] IN BLOOD BY AUTOMATED COUNT: 15.5 % (ref 11.5–14.5)
GFR SERPL CREATININE-BSD FRML MDRD: 53 ML/MIN/1.73M*2
GLUCOSE SERPL-MCNC: 94 MG/DL (ref 74–99)
HCT VFR BLD AUTO: 42 % (ref 41–52)
HDLC SERPL-MCNC: 36.6 MG/DL
HGB BLD-MCNC: 13.3 G/DL (ref 13.5–17.5)
HOLD SPECIMEN: NORMAL
INR PPP: 1.3 (ref 0.9–1.1)
INR PPP: 1.3 (ref 0.9–1.1)
LDLC SERPL CALC-MCNC: 55 MG/DL
LEFT ATRIUM VOLUME AREA LENGTH INDEX BSA: 32.6
LEFT VENTRICLE INTERNAL DIMENSION DIASTOLE: 4.17 (ref 3.5–6)
LEFT VENTRICULAR OUTFLOW TRACT DIAMETER: 2.03
MAGNESIUM SERPL-MCNC: 2.41 MG/DL (ref 1.6–2.4)
MCH RBC QN AUTO: 29.7 PG (ref 26–34)
MCHC RBC AUTO-ENTMCNC: 31.7 G/DL (ref 32–36)
MCV RBC AUTO: 94 FL (ref 80–100)
MITRAL VALVE E/A RATIO: 2.64
NON HDL CHOLESTEROL: 67 MG/DL (ref 0–149)
NRBC BLD-RTO: 0 /100 WBCS (ref 0–0)
PLATELET # BLD AUTO: 236 X10*3/UL (ref 150–450)
POTASSIUM SERPL-SCNC: 4.2 MMOL/L (ref 3.5–5.3)
PROTHROMBIN TIME: 14.6 SECONDS (ref 9.8–12.8)
PROTHROMBIN TIME: 15.1 SECONDS (ref 9.8–12.8)
RBC # BLD AUTO: 4.48 X10*6/UL (ref 4.5–5.9)
RIGHT VENTRICLE PEAK SYSTOLIC PRESSURE: 54.3
SODIUM SERPL-SCNC: 141 MMOL/L (ref 136–145)
TRIGL SERPL-MCNC: 63 MG/DL (ref 0–149)
VLDL: 13 MG/DL (ref 0–40)
WBC # BLD AUTO: 14.4 X10*3/UL (ref 4.4–11.3)

## 2023-12-08 PROCEDURE — 2500000004 HC RX 250 GENERAL PHARMACY W/ HCPCS (ALT 636 FOR OP/ED): Performed by: PHYSICIAN ASSISTANT

## 2023-12-08 PROCEDURE — 99233 SBSQ HOSP IP/OBS HIGH 50: CPT | Performed by: NURSE PRACTITIONER

## 2023-12-08 PROCEDURE — 97161 PT EVAL LOW COMPLEX 20 MIN: CPT | Mod: GP

## 2023-12-08 PROCEDURE — 97165 OT EVAL LOW COMPLEX 30 MIN: CPT | Mod: GO

## 2023-12-08 PROCEDURE — 85027 COMPLETE CBC AUTOMATED: CPT | Performed by: NURSE PRACTITIONER

## 2023-12-08 PROCEDURE — 80061 LIPID PANEL: CPT | Performed by: NURSE PRACTITIONER

## 2023-12-08 PROCEDURE — 80048 BASIC METABOLIC PNL TOTAL CA: CPT | Performed by: NURSE PRACTITIONER

## 2023-12-08 PROCEDURE — 71045 X-RAY EXAM CHEST 1 VIEW: CPT | Performed by: STUDENT IN AN ORGANIZED HEALTH CARE EDUCATION/TRAINING PROGRAM

## 2023-12-08 PROCEDURE — 99223 1ST HOSP IP/OBS HIGH 75: CPT | Performed by: INTERNAL MEDICINE

## 2023-12-08 PROCEDURE — 99222 1ST HOSP IP/OBS MODERATE 55: CPT | Performed by: PSYCHIATRY & NEUROLOGY

## 2023-12-08 PROCEDURE — 94664 DEMO&/EVAL PT USE INHALER: CPT

## 2023-12-08 PROCEDURE — 94640 AIRWAY INHALATION TREATMENT: CPT

## 2023-12-08 PROCEDURE — 85610 PROTHROMBIN TIME: CPT | Performed by: NURSE PRACTITIONER

## 2023-12-08 PROCEDURE — 2500000002 HC RX 250 W HCPCS SELF ADMINISTERED DRUGS (ALT 637 FOR MEDICARE OP, ALT 636 FOR OP/ED): Performed by: NURSE PRACTITIONER

## 2023-12-08 PROCEDURE — 87075 CULTR BACTERIA EXCEPT BLOOD: CPT | Mod: GEALAB | Performed by: NURSE PRACTITIONER

## 2023-12-08 PROCEDURE — 2500000004 HC RX 250 GENERAL PHARMACY W/ HCPCS (ALT 636 FOR OP/ED): Performed by: NURSE PRACTITIONER

## 2023-12-08 PROCEDURE — 83880 ASSAY OF NATRIURETIC PEPTIDE: CPT | Performed by: PHYSICIAN ASSISTANT

## 2023-12-08 PROCEDURE — 87040 BLOOD CULTURE FOR BACTERIA: CPT | Mod: GEALAB | Performed by: NURSE PRACTITIONER

## 2023-12-08 PROCEDURE — 1200000002 HC GENERAL ROOM WITH TELEMETRY DAILY

## 2023-12-08 PROCEDURE — 93306 TTE W/DOPPLER COMPLETE: CPT | Performed by: INTERNAL MEDICINE

## 2023-12-08 PROCEDURE — 36415 COLL VENOUS BLD VENIPUNCTURE: CPT | Performed by: NURSE PRACTITIONER

## 2023-12-08 PROCEDURE — 93306 TTE W/DOPPLER COMPLETE: CPT

## 2023-12-08 PROCEDURE — 2500000001 HC RX 250 WO HCPCS SELF ADMINISTERED DRUGS (ALT 637 FOR MEDICARE OP): Performed by: NURSE PRACTITIONER

## 2023-12-08 PROCEDURE — 99222 1ST HOSP IP/OBS MODERATE 55: CPT | Performed by: PHYSICIAN ASSISTANT

## 2023-12-08 PROCEDURE — 71045 X-RAY EXAM CHEST 1 VIEW: CPT | Mod: FY

## 2023-12-08 RX ORDER — CEFTRIAXONE 1 G/50ML
1 INJECTION, SOLUTION INTRAVENOUS ONCE
Status: COMPLETED | OUTPATIENT
Start: 2023-12-08 | End: 2023-12-08

## 2023-12-08 RX ORDER — FUROSEMIDE 40 MG/1
40 TABLET ORAL 3 TIMES WEEKLY
Status: DISCONTINUED | OUTPATIENT
Start: 2023-12-08 | End: 2023-12-08

## 2023-12-08 RX ORDER — ATORVASTATIN CALCIUM 40 MG/1
40 TABLET, FILM COATED ORAL NIGHTLY
Status: DISCONTINUED | OUTPATIENT
Start: 2023-12-08 | End: 2023-12-14 | Stop reason: HOSPADM

## 2023-12-08 RX ORDER — EAR PLUGS
EACH OTIC (EAR) AS NEEDED
Status: DISCONTINUED | OUTPATIENT
Start: 2023-12-08 | End: 2023-12-14 | Stop reason: HOSPADM

## 2023-12-08 RX ORDER — DILTIAZEM HYDROCHLORIDE 240 MG/1
240 CAPSULE, EXTENDED RELEASE ORAL DAILY
Status: DISCONTINUED | OUTPATIENT
Start: 2023-12-08 | End: 2023-12-08

## 2023-12-08 RX ORDER — BUPROPION HYDROCHLORIDE 150 MG/1
150 TABLET, EXTENDED RELEASE ORAL DAILY
Status: DISCONTINUED | OUTPATIENT
Start: 2023-12-08 | End: 2023-12-08

## 2023-12-08 RX ORDER — ASPIRIN 81 MG/1
81 TABLET ORAL DAILY
Status: DISCONTINUED | OUTPATIENT
Start: 2023-12-09 | End: 2023-12-14 | Stop reason: HOSPADM

## 2023-12-08 RX ORDER — FUROSEMIDE 10 MG/ML
40 INJECTION INTRAMUSCULAR; INTRAVENOUS EVERY 12 HOURS
Status: DISCONTINUED | OUTPATIENT
Start: 2023-12-08 | End: 2023-12-10

## 2023-12-08 RX ORDER — BUPROPION HYDROCHLORIDE 150 MG/1
150 TABLET ORAL DAILY
Status: DISCONTINUED | OUTPATIENT
Start: 2023-12-08 | End: 2023-12-14 | Stop reason: HOSPADM

## 2023-12-08 RX ORDER — IPRATROPIUM BROMIDE AND ALBUTEROL SULFATE 2.5; .5 MG/3ML; MG/3ML
3 SOLUTION RESPIRATORY (INHALATION)
Status: DISCONTINUED | OUTPATIENT
Start: 2023-12-08 | End: 2023-12-08

## 2023-12-08 RX ORDER — ALPRAZOLAM 0.5 MG/1
1 TABLET ORAL 2 TIMES DAILY PRN
Status: DISCONTINUED | OUTPATIENT
Start: 2023-12-08 | End: 2023-12-08

## 2023-12-08 RX ORDER — GABAPENTIN 100 MG/1
100 CAPSULE ORAL 3 TIMES DAILY
Status: DISCONTINUED | OUTPATIENT
Start: 2023-12-08 | End: 2023-12-14 | Stop reason: HOSPADM

## 2023-12-08 RX ORDER — OXYBUTYNIN CHLORIDE 5 MG/1
5 TABLET ORAL 3 TIMES DAILY
Status: DISCONTINUED | OUTPATIENT
Start: 2023-12-08 | End: 2023-12-14

## 2023-12-08 RX ORDER — IPRATROPIUM BROMIDE AND ALBUTEROL SULFATE 2.5; .5 MG/3ML; MG/3ML
3 SOLUTION RESPIRATORY (INHALATION)
Status: DISCONTINUED | OUTPATIENT
Start: 2023-12-09 | End: 2023-12-14 | Stop reason: HOSPADM

## 2023-12-08 RX ORDER — WARFARIN SODIUM 5 MG/1
5 TABLET ORAL DAILY
Status: DISPENSED | OUTPATIENT
Start: 2023-12-08 | End: 2023-12-11

## 2023-12-08 RX ORDER — ALBUTEROL SULFATE 0.83 MG/ML
2.5 SOLUTION RESPIRATORY (INHALATION) EVERY 2 HOUR PRN
Status: DISCONTINUED | OUTPATIENT
Start: 2023-12-08 | End: 2023-12-14 | Stop reason: HOSPADM

## 2023-12-08 RX ORDER — DOFETILIDE 0.25 MG/1
250 CAPSULE ORAL EVERY 12 HOURS SCHEDULED
Status: DISCONTINUED | OUTPATIENT
Start: 2023-12-08 | End: 2023-12-08

## 2023-12-08 RX ADMIN — PERFLUTREN 1 ML OF DILUTION: 6.52 INJECTION, SUSPENSION INTRAVENOUS at 11:01

## 2023-12-08 RX ADMIN — OXYBUTYNIN CHLORIDE 5 MG: 5 TABLET ORAL at 20:38

## 2023-12-08 RX ADMIN — WARFARIN SODIUM 5 MG: 5 TABLET ORAL at 17:19

## 2023-12-08 RX ADMIN — Medication 3 MG: at 20:38

## 2023-12-08 RX ADMIN — OXYBUTYNIN CHLORIDE 5 MG: 5 TABLET ORAL at 15:59

## 2023-12-08 RX ADMIN — OXYBUTYNIN CHLORIDE 5 MG: 5 TABLET ORAL at 10:10

## 2023-12-08 RX ADMIN — ACETAMINOPHEN 975 MG: 325 TABLET ORAL at 10:09

## 2023-12-08 RX ADMIN — BUPROPION HYDROCHLORIDE 150 MG: 150 TABLET, FILM COATED, EXTENDED RELEASE ORAL at 10:10

## 2023-12-08 RX ADMIN — POLYETHYLENE GLYCOL 3350 17 G: 17 POWDER, FOR SOLUTION ORAL at 10:10

## 2023-12-08 RX ADMIN — ATORVASTATIN CALCIUM 40 MG: 40 TABLET, FILM COATED ORAL at 20:38

## 2023-12-08 RX ADMIN — GABAPENTIN 100 MG: 100 CAPSULE ORAL at 15:59

## 2023-12-08 RX ADMIN — CITALOPRAM HYDROBROMIDE 40 MG: 20 TABLET ORAL at 10:10

## 2023-12-08 RX ADMIN — GABAPENTIN 100 MG: 100 CAPSULE ORAL at 10:10

## 2023-12-08 RX ADMIN — Medication: at 16:28

## 2023-12-08 RX ADMIN — ACETAMINOPHEN 975 MG: 325 TABLET ORAL at 20:38

## 2023-12-08 RX ADMIN — IPRATROPIUM BROMIDE AND ALBUTEROL SULFATE 3 ML: 2.5; .5 SOLUTION RESPIRATORY (INHALATION) at 20:00

## 2023-12-08 RX ADMIN — GABAPENTIN 100 MG: 100 CAPSULE ORAL at 20:38

## 2023-12-08 RX ADMIN — FUROSEMIDE 40 MG: 10 INJECTION, SOLUTION INTRAMUSCULAR; INTRAVENOUS at 16:11

## 2023-12-08 RX ADMIN — Medication 3 MG: at 01:19

## 2023-12-08 RX ADMIN — CEFTRIAXONE SODIUM 1 G: 1 INJECTION, SOLUTION INTRAVENOUS at 01:19

## 2023-12-08 SDOH — SOCIAL STABILITY: SOCIAL INSECURITY: DOES ANYONE TRY TO KEEP YOU FROM HAVING/CONTACTING OTHER FRIENDS OR DOING THINGS OUTSIDE YOUR HOME?: NO

## 2023-12-08 SDOH — SOCIAL STABILITY: SOCIAL INSECURITY: HAVE YOU HAD THOUGHTS OF HARMING ANYONE ELSE?: NO

## 2023-12-08 SDOH — SOCIAL STABILITY: SOCIAL INSECURITY: DO YOU FEEL UNSAFE GOING BACK TO THE PLACE WHERE YOU ARE LIVING?: NO

## 2023-12-08 SDOH — SOCIAL STABILITY: SOCIAL INSECURITY: ARE YOU OR HAVE YOU BEEN THREATENED OR ABUSED PHYSICALLY, EMOTIONALLY, OR SEXUALLY BY ANYONE?: NO

## 2023-12-08 SDOH — SOCIAL STABILITY: SOCIAL INSECURITY: DO YOU FEEL ANYONE HAS EXPLOITED OR TAKEN ADVANTAGE OF YOU FINANCIALLY OR OF YOUR PERSONAL PROPERTY?: NO

## 2023-12-08 SDOH — SOCIAL STABILITY: SOCIAL INSECURITY: ARE THERE ANY APPARENT SIGNS OF INJURIES/BEHAVIORS THAT COULD BE RELATED TO ABUSE/NEGLECT?: NO

## 2023-12-08 SDOH — SOCIAL STABILITY: SOCIAL INSECURITY: ABUSE: ADULT

## 2023-12-08 SDOH — SOCIAL STABILITY: SOCIAL INSECURITY: HAS ANYONE EVER THREATENED TO HURT YOUR FAMILY OR YOUR PETS?: NO

## 2023-12-08 SDOH — SOCIAL STABILITY: SOCIAL INSECURITY: WERE YOU ABLE TO COMPLETE ALL THE BEHAVIORAL HEALTH SCREENINGS?: YES

## 2023-12-08 ASSESSMENT — LIFESTYLE VARIABLES
SKIP TO QUESTIONS 9-10: 1
HOW OFTEN DO YOU HAVE A DRINK CONTAINING ALCOHOL: NEVER
AUDIT-C TOTAL SCORE: 0
HOW MANY STANDARD DRINKS CONTAINING ALCOHOL DO YOU HAVE ON A TYPICAL DAY: PATIENT DOES NOT DRINK
HOW OFTEN DO YOU HAVE 6 OR MORE DRINKS ON ONE OCCASION: NEVER
AUDIT-C TOTAL SCORE: 0

## 2023-12-08 ASSESSMENT — COGNITIVE AND FUNCTIONAL STATUS - GENERAL
EATING MEALS: A LITTLE
TOILETING: A LOT
MOVING TO AND FROM BED TO CHAIR: A LOT
DRESSING REGULAR LOWER BODY CLOTHING: A LOT
DRESSING REGULAR LOWER BODY CLOTHING: A LOT
HELP NEEDED FOR BATHING: A LOT
PERSONAL GROOMING: A LITTLE
MOVING FROM LYING ON BACK TO SITTING ON SIDE OF FLAT BED WITH BEDRAILS: A LITTLE
DRESSING REGULAR LOWER BODY CLOTHING: TOTAL
DAILY ACTIVITIY SCORE: 14
MOVING FROM LYING ON BACK TO SITTING ON SIDE OF FLAT BED WITH BEDRAILS: A LITTLE
MOBILITY SCORE: 11
CLIMB 3 TO 5 STEPS WITH RAILING: TOTAL
STANDING UP FROM CHAIR USING ARMS: A LOT
PATIENT BASELINE BEDBOUND: NO
DRESSING REGULAR UPPER BODY CLOTHING: A LOT
DRESSING REGULAR UPPER BODY CLOTHING: A LITTLE
MOBILITY SCORE: 12
TOILETING: TOTAL
EATING MEALS: A LITTLE
WALKING IN HOSPITAL ROOM: A LOT
HELP NEEDED FOR BATHING: A LOT
WALKING IN HOSPITAL ROOM: A LOT
STANDING UP FROM CHAIR USING ARMS: A LOT
TURNING FROM BACK TO SIDE WHILE IN FLAT BAD: A LOT
DRESSING REGULAR LOWER BODY CLOTHING: A LOT
TURNING FROM BACK TO SIDE WHILE IN FLAT BAD: A LOT
HELP NEEDED FOR BATHING: A LOT
DAILY ACTIVITIY SCORE: 16
DRESSING REGULAR UPPER BODY CLOTHING: A LITTLE
CLIMB 3 TO 5 STEPS WITH RAILING: TOTAL
STANDING UP FROM CHAIR USING ARMS: A LOT
MOVING TO AND FROM BED TO CHAIR: A LOT
PERSONAL GROOMING: A LITTLE
CLIMB 3 TO 5 STEPS WITH RAILING: TOTAL
DAILY ACTIVITIY SCORE: 16
STANDING UP FROM CHAIR USING ARMS: A LOT
TURNING FROM BACK TO SIDE WHILE IN FLAT BAD: A LOT
MOBILITY SCORE: 12
MOVING TO AND FROM BED TO CHAIR: A LOT
TURNING FROM BACK TO SIDE WHILE IN FLAT BAD: A LOT
MOBILITY SCORE: 10
CLIMB 3 TO 5 STEPS WITH RAILING: TOTAL
MOVING FROM LYING ON BACK TO SITTING ON SIDE OF FLAT BED WITH BEDRAILS: A LOT
DAILY ACTIVITIY SCORE: 12
WALKING IN HOSPITAL ROOM: A LOT
PERSONAL GROOMING: A LITTLE
TOILETING: A LOT
MOVING TO AND FROM BED TO CHAIR: A LOT
TOILETING: A LOT
DRESSING REGULAR UPPER BODY CLOTHING: A LOT
MOVING FROM LYING ON BACK TO SITTING ON SIDE OF FLAT BED WITH BEDRAILS: A LOT
PERSONAL GROOMING: A LITTLE
WALKING IN HOSPITAL ROOM: TOTAL
HELP NEEDED FOR BATHING: A LOT

## 2023-12-08 ASSESSMENT — ENCOUNTER SYMPTOMS
SPEECH DIFFICULTY: 1
WOUND: 0
AGITATION: 0
SHORTNESS OF BREATH: 1
POLYDIPSIA: 0
RHINORRHEA: 0
DIZZINESS: 0
NECK PAIN: 0
ABDOMINAL DISTENTION: 0
BACK PAIN: 0
CHILLS: 0
SEIZURES: 0
WHEEZING: 1
VOMITING: 0
HEADACHES: 0
CONFUSION: 1
EYE REDNESS: 0
COUGH: 0
SORE THROAT: 0
DIARRHEA: 0
FEVER: 0
EYE ITCHING: 0

## 2023-12-08 ASSESSMENT — PAIN - FUNCTIONAL ASSESSMENT
PAIN_FUNCTIONAL_ASSESSMENT: 0-10

## 2023-12-08 ASSESSMENT — PAIN SCALES - GENERAL
PAINLEVEL_OUTOF10: 0 - NO PAIN

## 2023-12-08 ASSESSMENT — ACTIVITIES OF DAILY LIVING (ADL)
FEEDING YOURSELF: NEEDS ASSISTANCE
WALKS IN HOME: DEPENDENT
DRESSING YOURSELF: NEEDS ASSISTANCE
LACK_OF_TRANSPORTATION: NO
ADEQUATE_TO_COMPLETE_ADL: YES
BATHING: NEEDS ASSISTANCE
TOILETING: NEEDS ASSISTANCE
ADL_ASSISTANCE: INDEPENDENT
ADL_ASSISTANCE: INDEPENDENT
GROOMING: NEEDS ASSISTANCE
HEARING - LEFT EAR: DIFFICULTY WITH NOISE
PATIENT'S MEMORY ADEQUATE TO SAFELY COMPLETE DAILY ACTIVITIES?: NO
JUDGMENT_ADEQUATE_SAFELY_COMPLETE_DAILY_ACTIVITIES: NO
HEARING - RIGHT EAR: DIFFICULTY WITH NOISE
ASSISTIVE_DEVICE: WALKER;WHEELCHAIR

## 2023-12-08 ASSESSMENT — PATIENT HEALTH QUESTIONNAIRE - PHQ9
2. FEELING DOWN, DEPRESSED OR HOPELESS: NOT AT ALL
1. LITTLE INTEREST OR PLEASURE IN DOING THINGS: NOT AT ALL
SUM OF ALL RESPONSES TO PHQ9 QUESTIONS 1 & 2: 0

## 2023-12-08 NOTE — PROGRESS NOTES
Jean Claude Allen is a 87 y.o. male on day 1 of admission presenting with DENA (acute kidney injury) (CMS/[facility]).      Subjective   The patient is currently alert and oriented X3. His son is at bedside and reports that the patient has returned to baseline mentation. Family called EMS at the facility due to slurred speech and right upper extremity weakness.        Objective     Last Recorded Vitals  /69 (Patient Position: Sitting)   Pulse 62   Temp 36.4 °C (97.5 °F) (Temporal)   Resp (!) 118   Wt 130 kg (286 lb 9.6 oz)   SpO2 94%   Intake/Output last 3 Shifts:    Intake/Output Summary (Last 24 hours) at 12/8/2023 1421  Last data filed at 12/8/2023 1345  Gross per 24 hour   Intake 1240 ml   Output --   Net 1240 ml       Admission Weight  Weight: 130 kg (287 lb 7.7 oz) (12/07/23 1935)    Daily Weight  12/08/23 : 130 kg (286 lb 9.6 oz)    Image Results  Transthoracic Echo (TTE) [facility], [address] [place] [address]                Tel [phone] and Fax [phone]    TRANSTHORACIC ECHOCARDIOGRAM REPORT       Patient Name:      JEAN CLAUDE ALLEN       Reading Physician:    [id number] Avery Kay MD  Study Date:        12/8/2023            Ordering Provider:    [id number] PAO SWANSON  MRN/PID:           [id number]             Fellow:  Accession#:        [id number]         Nurse:                Milagros Santos RN  Date of Birth/Age: 1935 / 87      Sonographer:          Shannon beverly                                      ALONA  Gender:            M                    Additional Staff:  Height:            182.88 cm            Admit Date:           12/7/2023  Weight:            129.73 kg            Admission Status:     Inpatient -                                                                Routine  BSA:               2.48 m2              Encounter#:           [id number]                                           Department Location:  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    Study Detail: The following Echo studies were performed: 2D, M-Mode, Doppler and                color flow. Definity used as a contrast agent for endocardial                border definition. Total contrast used for this procedure was 1 mL                via IV push.       PHYSICIAN INTERPRETATION:  Left Ventricle: The left ventricular systolic function is normal. The calculated ejection fraction is normal at 60 % using the Lui's Bi-plane MOD calculation. There are no regional wall motion abnormalities. The left ventricular cavity size is normal. The left ventricular septal wall thickness is moderately increased. Left ventricular diastolic filling was not assessed.  Left Atrium: The left atrium is upper limits of normal in size.  Right Ventricle: The right ventricle was not well visualized. There is normal right ventricular global systolic function.  Right Atrium: The right atrium was not well visualized.  Aortic Valve: The aortic valve is trileaflet. There is evidence of moderate aortic valve stenosis.  There is a low aortic valve gradient, with a normal ejection fraction. There is no evidence of aortic valve regurgitation. The peak instantaneous gradient of the aortic valve is 18.0 mmHg. The mean gradient of the aortic valve is 12.3 mmHg.  Mitral Valve: The mitral valve is normal in structure. There is no evidence of mitral valve regurgitation.  Tricuspid Valve: The  tricuspid valve is structurally normal. There is trace tricuspid regurgitation. The Doppler estimated RVSP is mildly elevated at 54.3 mmHg.  Pulmonic Valve: The pulmonic valve is not well visualized. The pulmonic valve regurgitation was not well visualized.  Pericardium: There is no pericardial effusion noted.  Aorta: The aortic root is normal.  In comparison to the previous echocardiogram(s): Compared with study from 2021, no significant change.       CONCLUSIONS:   1. Left ventricular systolic function is normal.   2. Moderately increased left ventricular septal thickness.   3. Moderate aortic valve stenosis.   4. Mildly elevated RVSP.    QUANTITATIVE DATA SUMMARY:  2D MEASUREMENTS:                            Normal Ranges:  IVSd:          1.55 cm    (0.6-1.1cm)  LVPWd:         1.81 cm    (0.6-1.1cm)  LVIDd:         4.17 cm    (3.9-5.9cm)  LVIDs:         2.90 cm  LV Mass Index: 118.8 g/m2  LV % FS        30.5 %    LA VOLUME:                               Normal Ranges:  LA Vol A4C:       74.0 ml    (22+/-6mL/m2)  LA Vol A2C:       79.9 ml  LA Vol BP:        80.8 ml  LA Vol Index A4C: 29.8 ml/m2  LA Vol Index A2C: 32.2 ml/m2  LA Vol Index BP:  32.6 ml/m2  LA Volume Index:  32.6 ml/m2  LA Vol A4C:       67.4 ml  LA Vol A2C:       77.1 ml    M-MODE MEASUREMENTS:                   Normal Ranges:  Ao Root: 3.20 cm (2.0-3.7cm)  LAs:     4.37 cm (2.7-4.0cm)    LV SYSTOLIC FUNCTION BY 2D PLANIMETRY (MOD):                      Normal Ranges:  EF-A4C View: 61.1 % (>=55%)  EF-A2C View: 59.7 %  EF-Biplane:  60.2 %    LV DIASTOLIC FUNCTION:                      Normal Ranges:  MV Peak E: 1.27 m/s (0.7-1.2 m/s)  MV Peak A: 0.48 m/s (0.42-0.7 m/s)  E/A Ratio: 2.64     (1.0-2.2)    MITRAL VALVE:                  Normal Ranges:  MV DT: 238 msec (150-240msec)    AORTIC VALVE:                                     Normal Ranges:  AoV Vmax:                2.12 m/s  (<=1.7m/s)  AoV Peak P.0 mmHg (<20mmHg)  AoV Mean  P.3 mmHg (1.7-11.5mmHg)  LVOT Max Rico:            0.73 m/s  (<=1.1m/s)  AoV VTI:                 48.40 cm  (18-25cm)  LVOT VTI:                15.58 cm  LVOT Diameter:           2.03 cm   (1.8-2.4cm)  AoV Area, VTI:           1.05 cm2  (2.5-5.5cm2)  AoV Area,Vmax:           1.13 cm2  (2.5-4.5cm2)  AoV Dimensionless Index: 0.32    TRICUSPID VALVE/RVSP:                              Normal Ranges:  Peak TR Velocity: 3.58 m/s  RV Syst Pressure: 54.3 mmHg (< 30mmHg)    PULMONIC VALVE:                       Normal Ranges:  PV Max Rico: 1.0 m/s  (0.6-0.9m/s)  PV Max P.3 mmHg       [id number] Avery Kay MD  Electronically signed on 2023 at 1:11:58 PM       ** Final **  XR chest 1 view  Narrative: Interpreted By:  Clarita Faust,   STUDY:  XR CHEST 1 VIEW; 2023 9:57 am      INDICATION:  Signs/Symptoms:Repeat      COMPARISON:  Chest radiographs 2022.      ACCESSION NUMBER(S):  [id number]      ORDERING CLINICIAN:  DALE VALDES      TECHNIQUE:  Single frontal view of the chest performed.      FINDINGS:  LINES AND DEVICES:  None.      LUNGS:  Bilateral interstitial opacities, right slightly greater than left.  Bibasilar patchy airspace opacities. Obscure left costophrenic angle  by overlying soft tissue. No right pleural effusion. No pneumothorax.      CARDIOMEDIASTINAL SILHOUETTE:  Stable cardiomegaly.      Impression: Congestive heart failure with pulmonary edema and cardiomegaly.      MACRO  None      Signed by: Clarita Faust 2023 11:06 AM  Dictation workstation:   [id number]      Physical Exam  Constitutional:       Appearance: He is obese.   HENT:      Nose: Nose normal.      Mouth/Throat:      Mouth: Mucous membranes are dry.   Eyes:      Extraocular Movements: Extraocular movements intact.   Neck:      Vascular: No JVD.   Cardiovascular:      Rate and Rhythm: Rhythm irregular.      Heart sounds: No murmur heard.  Pulmonary:      Effort: No respiratory distress.      Breath sounds:  Normal breath sounds.   Abdominal:      General: There is distension.   Musculoskeletal:      Right lower le+ Pitting Edema present.      Left lower le+ Pitting Edema present.   Skin:     Comments: Sacral pressure ulcer (see pictures)   Neurological:      General: No focal deficit present.      Mental Status: He is alert and oriented to person, place, and time. Mental status is at baseline.      Motor: Weakness present.      Comments: Dysarthria likely due to poor dentition and dry oral mucosa. Generalized weakness.       Results for orders placed or performed during the hospital encounter of 23 (from the past 24 hour(s))   CBC and Auto Differential   Result Value Ref Range    WBC 11.2 4.4 - 11.3 x10*3/uL    nRBC 0.0 0.0 - 0.0 /100 WBCs    RBC 4.60 4.50 - 5.90 x10*6/uL    Hemoglobin 13.8 13.5 - 17.5 g/dL    Hematocrit 43.6 41.0 - 52.0 %    MCV 95 80 - 100 fL    MCH 30.0 26.0 - 34.0 pg    MCHC 31.7 (L) 32.0 - 36.0 g/dL    RDW 15.5 (H) 11.5 - 14.5 %    Platelets 253 150 - 450 x10*3/uL    Neutrophils % 82.5 40.0 - 80.0 %    Immature Granulocytes %, Automated 0.4 0.0 - 0.9 %    Lymphocytes % 7.4 13.0 - 44.0 %    Monocytes % 6.8 2.0 - 10.0 %    Eosinophils % 2.4 0.0 - 6.0 %    Basophils % 0.5 0.0 - 2.0 %    Neutrophils Absolute 9.23 (H) 1.60 - 5.50 x10*3/uL    Immature Granulocytes Absolute, Automated 0.05 0.00 - 0.50 x10*3/uL    Lymphocytes Absolute 0.83 0.80 - 3.00 x10*3/uL    Monocytes Absolute 0.76 0.05 - 0.80 x10*3/uL    Eosinophils Absolute 0.27 0.00 - 0.40 x10*3/uL    Basophils Absolute 0.06 0.00 - 0.10 x10*3/uL   Comprehensive Metabolic Panel   Result Value Ref Range    Glucose 103 (H) 74 - 99 mg/dL    Sodium 140 136 - 145 mmol/L    Potassium 4.4 3.5 - 5.3 mmol/L    Chloride 102 98 - 107 mmol/L    Bicarbonate 31 21 - 32 mmol/L    Anion Gap 11 10 - 20 mmol/L    Urea Nitrogen 39 (H) 6 - 23 mg/dL    Creatinine 1.58 (H) 0.50 - 1.30 mg/dL    eGFR 43 (L) >60 mL/min/1.73m*2    Calcium 8.8 8.6 - 10.3 mg/dL     Albumin 3.5 3.4 - 5.0 g/dL    Alkaline Phosphatase 48 33 - 136 U/L    Total Protein 6.7 6.4 - 8.2 g/dL    AST 19 9 - 39 U/L    Bilirubin, Total 0.5 0.0 - 1.2 mg/dL    ALT 12 10 - 52 U/L   Alcohol   Result Value Ref Range    Alcohol <10 <=10 mg/dL   Lactate   Result Value Ref Range    Lactate 1.7 0.4 - 2.0 mmol/L   Protime-INR   Result Value Ref Range    Protime 14.3 (H) 9.8 - 12.8 seconds    INR 1.3 (H) 0.9 - 1.1   Type And Screen   Result Value Ref Range    ABO TYPE A     Rh TYPE POS     ANTIBODY SCREEN NEG    Magnesium   Result Value Ref Range    Magnesium 2.41 (H) 1.60 - 2.40 mg/dL   Drug Screen, Urine   Result Value Ref Range    Amphetamine Screen, Urine Presumptive Positive (A) Presumptive Negative    Barbiturate Screen, Urine Presumptive Negative Presumptive Negative    Benzodiazepines Screen, Urine Presumptive Negative Presumptive Negative    Cannabinoid Screen, Urine Presumptive Negative Presumptive Negative    Cocaine Metabolite Screen, Urine Presumptive Negative Presumptive Negative    Fentanyl Screen, Urine Presumptive Negative Presumptive Negative    Opiate Screen, Urine Presumptive Negative Presumptive Negative    Oxycodone Screen, Urine Presumptive Negative Presumptive Negative    PCP Screen, Urine Presumptive Negative Presumptive Negative   Urinalysis with Reflex Microscopic and Culture   Result Value Ref Range    Color, Urine Becca (N) Straw, Yellow    Appearance, Urine Hazy (N) Clear    Specific Gravity, Urine 1.032 1.005 - 1.035    pH, Urine 7.0 5.0, 5.5, 6.0, 6.5, 7.0, 7.5, 8.0    Protein, Urine 100 (2+) (N) NEGATIVE mg/dL    Glucose, Urine NEGATIVE NEGATIVE mg/dL    Blood, Urine MODERATE (2+) (A) NEGATIVE    Ketones, Urine NEGATIVE NEGATIVE mg/dL    Bilirubin, Urine NEGATIVE NEGATIVE    Urobilinogen, Urine 2.0 (N) <2.0 mg/dL    Nitrite, Urine POSITIVE (A) NEGATIVE    Leukocyte Esterase, Urine MODERATE (2+) (A) NEGATIVE   Extra Urine Gray Tube   Result Value Ref Range    Extra Tube Hold for  add-ons.    Microscopic Only, Urine   Result Value Ref Range    WBC, Urine >50 (A) 1-5, NONE /HPF    RBC, Urine >20 (A) NONE, 1-2, 3-5 /HPF    Bacteria, Urine 1+ (A) NONE SEEN /HPF    Fine Granular Casts, Urine 1+ (A) NONE /LPF    Calcium Oxalate Crystals, Urine 4+ (A) NONE, 1+ /HPF    Amorphous Crystals, Urine 1+ NONE, 1+, 2+ /HPF   Protime-INR   Result Value Ref Range    Protime 14.6 (H) 9.8 - 12.8 seconds    INR 1.3 (H) 0.9 - 1.1   Blood Culture    Specimen: Peripheral Venipuncture; Blood culture   Result Value Ref Range    Blood Culture Loaded on Instrument - Culture in progress    Blood Culture    Specimen: Peripheral Venipuncture; Blood culture   Result Value Ref Range    Blood Culture Loaded on Instrument - Culture in progress    Basic metabolic panel   Result Value Ref Range    Glucose 94 74 - 99 mg/dL    Sodium 141 136 - 145 mmol/L    Potassium 4.2 3.5 - 5.3 mmol/L    Chloride 105 98 - 107 mmol/L    Bicarbonate 28 21 - 32 mmol/L    Anion Gap 12 10 - 20 mmol/L    Urea Nitrogen 36 (H) 6 - 23 mg/dL    Creatinine 1.32 (H) 0.50 - 1.30 mg/dL    eGFR 53 (L) >60 mL/min/1.73m*2    Calcium 8.6 8.6 - 10.3 mg/dL   CBC   Result Value Ref Range    WBC 14.4 (H) 4.4 - 11.3 x10*3/uL    nRBC 0.0 0.0 - 0.0 /100 WBCs    RBC 4.48 (L) 4.50 - 5.90 x10*6/uL    Hemoglobin 13.3 (L) 13.5 - 17.5 g/dL    Hematocrit 42.0 41.0 - 52.0 %    MCV 94 80 - 100 fL    MCH 29.7 26.0 - 34.0 pg    MCHC 31.7 (L) 32.0 - 36.0 g/dL    RDW 15.5 (H) 11.5 - 14.5 %    Platelets 236 150 - 450 x10*3/uL   Lipid Panel   Result Value Ref Range    Cholesterol 104 0 - 199 mg/dL    HDL-Cholesterol 36.6 mg/dL    Cholesterol/HDL Ratio 2.8     LDL Calculated 55 <=99 mg/dL    VLDL 13 0 - 40 mg/dL    Triglycerides 63 0 - 149 mg/dL    Non HDL Cholesterol 67 0 - 149 mg/dL   B-type Natriuretic Peptide   Result Value Ref Range     (H) 0 - 99 pg/mL   Transthoracic Echo (TTE) Complete   Result Value Ref Range    AV pk miguel 2.12     AV mn grad 12.3     LVOT diam 2.03      LV biplane EF 60     MV E/A ratio 2.64     LA vol index A/L 32.6     LVIDd 4.17     RVSP 54.3     Aortic Valve Area by Continuity of VTI 1.05     Aortic Valve Area by Continuity of Peak Velocity 1.13     AV pk grad 18.0     LV A4C EF 61.1        Relevant Results             No current facility-administered medications on file prior to encounter.     Current Outpatient Medications on File Prior to Encounter   Medication Sig Dispense Refill    buPROPion XL (Wellbutrin XL) 150 mg 24 hr tablet TAKE 1 TABLET BY MOUTH EVERY DAY (Patient not taking: Reported on 12/8/2023) 90 tablet 3    citalopram (CeleXA) 40 mg tablet Take 1 tablet (40 mg) by mouth once daily. 90 tablet 3    cyanocobalamin (Vitamin B-12) 500 mcg tablet Take 2 tablets (1,000 mcg) by mouth once daily.      dilTIAZem XR (Dilacor XR) 180 mg 24 hr capsule Take 1 capsule (180 mg) by mouth once daily.      dilTIAZem XR (Dilacor XR) 240 mg 24 hr capsule Take 1 capsule (240 mg) by mouth once daily.      dofetilide (Tikosyn) 500 mcg capsule Take 1 capsule (500 mcg) by mouth once daily.      furosemide (Lasix) 40 mg tablet Take 1 tablet (40 mg) by mouth. Every Monday, Wednesday & Friday      gabapentin (Neurontin) 300 mg capsule Take 1 capsule (300 mg) by mouth 2 times a day. 180 capsule 3    oxybutynin (Ditropan) 5 mg tablet Take 2 tablets (10 mg) by mouth 2 times a day.      simvastatin (Zocor) 20 mg tablet TAKE 1 TABLET BY MOUTH EVERY DAY 90 tablet 3    warfarin (Coumadin) 2.5 mg tablet 1 daily Monday and Friday 24 tablet 3    [DISCONTINUED] acetaminophen (Tylenol) 500 mg tablet Take 2 tablets (1,000 mg) by mouth 2 times a day.      [DISCONTINUED] ALPRAZolam (Xanax) 1 mg tablet Take 1 tablet (1 mg) by mouth 3 times a day as needed.      [DISCONTINUED] buPROPion SR (Wellbutrin SR) 150 mg 12 hr tablet Take 1 tablet (150 mg) by mouth once daily. Do not crush, chew, or split.      [DISCONTINUED] cholecalciferol (Vitamin D-3) 50 mcg (2,000 unit) capsule Take 1  capsule (50 mcg) by mouth early in the morning..      [DISCONTINUED] dilTIAZem LA (Cardizem LA) 240 mg 24 hr tablet Take 1 tablet (240 mg) by mouth once daily.      [DISCONTINUED] Matzim  mg 24 hr tablet Take 1 tablet (180 mg) by mouth once daily.        Scheduled medications  acetaminophen, 975 mg, oral, BID  [START ON 12/9/2023] aspirin, 81 mg, oral, Daily  atorvastatin, 40 mg, oral, Nightly  buPROPion XL, 150 mg, oral, Daily  citalopram, 40 mg, oral, Daily  furosemide, 40 mg, intravenous, q12h  [Held by provider] furosemide, 40 mg, oral, Once per day on Mon Wed Fri  gabapentin, 100 mg, oral, TID  melatonin, 3 mg, oral, Daily  oxybutynin, 5 mg, oral, TID  perflutren protein A microsphere, 0.5 mL, intravenous, Once in imaging  polyethylene glycol, 17 g, oral, Daily  sulfur hexafluoride microsphr, 2 mL, intravenous, Once in imaging  [Held by provider] warfarin, 5 mg, oral, Daily      Continuous medications  [Held by provider] sodium chloride 0.9%, 100 mL/hr, Last Rate: 100 mL/hr (12/08/23 1345)      PRN medications  PRN medications: acetaminophen, zinc oxide     Assessment/Plan                  Principal Problem:    DENA (acute kidney injury) (CMS/[facility])  Active Problems:    Altered mental status, unspecified altered mental status type      Acute encephalopathy   -Likely due to infectious process with UTI and dehydration  -MRI brain and MRA head and neck results pending  -Neurology adds that stroke is unlikely recommends PT  -Lipid panel  -ASA and statin added   -Monitor for increase of symptoms; son says he is at baseline    UTI  -Urine and BC pending  -Continue ceftriaxone    Diastolic HF exacerbation  Atrial fibrillation   -Pitting edema noted  -Cardiology recommends Lasix 40mg IVP BID and holding Cardizem and Tikosyn for now.    -Telemetry monitoring   -Echocardiogram shows hypervolemia  -Will continue Warfarin   -Strict intake and output    DENA  -Improving  -Continue ditropan  -Continuing the furosemide    -Monitor renal function  -Consider nephrology consult if worsens       Mechanical fall   -No injury two days ago trauma has signed off   -Will resume coumadin     Sacral wound  -Does not appear infected  -Wound care      DVT prophylaxis  -Continue coumadin        Dispo: PT will re-evaluate the patient once he is stable to determine where he goes after discharge.     Erin Bennett APRN-CNP

## 2023-12-08 NOTE — CONSULTS
Wound Care Consult     Visit Date: 12/8/2023      Patient Name: Melecio Whaley         MRN: [id number]           YOB: 1935     Reason for Consult:  Left buttock wound     Wound History:  Present on admission     Pertinent Labs:   Albumin   Date Value Ref Range Status   12/07/2023 3.5 3.4 - 5.0 g/dL Final       Wound Assessment:  Wound 12/08/23 Buttocks Left (Active)   Wound Image   12/08/23 0049       Wound 12/08/23 Arm Left (Active)   Wound Image   12/08/23 1426       Wound Team Summary Assessment:   86 y/o CM was admitted 12-7-23 with DENA, AMS, recent fall.  He was observed to have skin changes POA.  Skin was assessed now with his LPN, Kenyatta.  Melecio is alert, pleasant, cooperative but fidgety and constantly removing tele leads, etc.  He is able to move and freely reposition self in bed.   There is much chronic appearing lavender, blanchable discoloration to the buttocks with small partial thickness dry wounds -- 0.7 x 0.7 x 0.1 cm on left and a more linear wound 1 x 0.3 cm on opposing skin of right buttock which is scabbed.  Location and assessment suggest MASD related skin breakdown - goal: protect and heal.  Recommend:  Bordeaux Butt paste (already at bedside) with sacral Mepilex, apply BID and after incontinence as needed.     He also has a large ecchymotic area to left proximal forearm within which there is a Cat.2 skin tear 2.5 cm in size, scant sanguineous drainage.  The epidermal flap is retracted and in a fixed position, the wound bed pink and clean.  Goal:  protect and promote moist healing using Xeroform and Mepilex foam, change every other day.    Assessment and recommendations were d/w attending provider, orders obtained and care provided.       Wound Team Plan:   Bordeaux Butt paste and sacral Mepilex, apply twice daily (and prn) to heal / protect buttock wounds.                                   Xeroform and Mepilex foam dressing, change every other day for moist healing of left  arm skin tear.     Jose Marino RN  12/8/2023  2:47 PM

## 2023-12-08 NOTE — PROGRESS NOTES
Physical Therapy    Physical Therapy Evaluation    Patient Name: Melecio Whaley  MRN: [id number]  Today's Date: 12/8/2023   Time Calculation  Start Time: 0850  Stop Time: 0910  Time Calculation (min): 20 min    Assessment/Plan   PT Assessment  PT Assessment Results: Decreased strength, Decreased endurance, Impaired balance, Decreased mobility  Rehab Prognosis: Good  Evaluation/Treatment Tolerance: Patient limited by fatigue  Medical Staff Made Aware: Yes  Strengths: Housing layout, Support of Caregivers  Barriers to Participation: Ability to acquire knowledge  End of Session Communication: Bedside nurse, PCT/NA/CTA  Assessment Comment: Patient is [place] and confused at this time. Puts forth good effort but limited d/t decreased activity tolerance.  End of Session Patient Position: Up in chair, Alarm on  IP OR SWING BED PT PLAN  Inpatient or Swing Bed: Inpatient  PT Plan  Treatment/Interventions: Bed mobility, Transfer training, Gait training, Balance training, Strengthening, Endurance training  PT Plan: Skilled PT  PT Frequency: 3 times per week  PT Discharge Recommendations: Moderate intensity level of continued care  Equipment Recommended upon Discharge: Wheeled walker  PT Recommended Transfer Status: Assist x2  PT - OK to Discharge: Yes (per PT POC)      Subjective   General Visit Information:  General  Reason for Referral: Pt is a 88 y/o male  presenting with multiple complaints including new waxing and waning slurred speech with a very dry mouth, urinary incontinence at baseline with malodorous urine, increased dyspnea with wheezing and leg edema, and recent fall out of bed a day ago  Referred By: DONALD Linn  Past Medical History Relevant to Rehab: CHF (congestive heart failure) (CMS/HCC), Depression, Heart disease, and atrial fib and on coumadin, neuropathy, NED and refuses CPAP, anxiety/depression, CAD, HPLD, dyspnea, cognitive impairment, HTN  Family/Caregiver Present: No  Co-Treatment: OT  Co-Treatment  Reason: Maximize patient outcomes  Prior to Session Communication: Bedside nurse  Patient Position Received: Bed, 3 rail up, Alarm on  General Comment: Patient is [place], cooperative and agreeable to therapy evals  Home Living:  Home Living  Type of Home: Assisted living  Lives With: Spouse  Home Adaptive Equipment: Walker rolling or standard  Home Access: Level entry  Prior Level of Function:  Prior Function Per Pt/Caregiver Report  Level of [place]: Needs assistance with homemaking, Independent with ADLs and functional transfers  Receives Help From:  (Staff at [facility])  ADL Assistance: Independent  Homemaking Assistance:  (Patient reports he does not wear socks at home)  Ambulatory Assistance: Independent (Uses walker and w/c)  Precautions:  Precautions  Medical Precautions: Fall precautions (tele (patient has removed, aide notified, IV))  Vital Signs:  Vital Signs  SpO2: 92 %  Patient Position: Sitting    Objective   Pain:  Pain Assessment  Pain Assessment: 0-10  Pain Score: 0 - No pain  Cognition:  Cognition  Overall Cognitive Status: Impaired  Orientation Level: Disoriented to place, Disoriented to situation    General Assessments:                Activity Tolerance  Endurance: Tolerates less than 10 min exercise, no significant change in vital signs (Patient with audible wheezing throughout mobility, SpO2 > 92%)    Sensation  Light Touch: No apparent deficits    Strength  Strength Comments: Functionally B LE 4-/5           Coordination  Movements are Fluid and Coordinated: Yes    Postural Control  Postural Control: Within Functional Limits    Static Sitting Balance  Static Sitting-Balance Support: Feet supported, Bilateral upper extremity supported  Static Sitting-Level of Assistance: Maximum assistance (Retropulsive, moments of min A however unable to sustain)    Static Standing Balance  Static Standing-Balance Support: Bilateral upper extremity supported  Static Standing-Level of Assistance: Minimum assistance  (x 2)  Functional Assessments:  Bed Mobility  Bed Mobility: Yes  Bed Mobility 1  Bed Mobility 1: Supine to sitting  Level of Assistance 1: Maximum assistance    Transfers  Transfer: Yes  Transfer 1  Transfer From 1: Sit to  Transfer to 1: Stand  Technique 1: Sit to stand, Stand to sit  Transfer Device 1: Walker  Transfer Level of Assistance 1: Minimum assistance, Moderate assistance (2 person assist)    Ambulation/Gait Training  Ambulation/Gait Training Performed: Yes  Ambulation/Gait Training 1  Surface 1: Level tile  Device 1: Rolling walker  Assistance 1: Minimum assistance (x 2)  Comments/Distance (ft) 1: 3' (bed>chair)       Outcome Measures:  [facility] Basic Mobility  Turning from your back to your side while in a flat bed without using bedrails: A lot  Moving from lying on your back to sitting on the side of a flat bed without using bedrails: A lot  Moving to and from bed to chair (including a wheelchair): A lot  Standing up from a chair using your arms (e.g. wheelchair or bedside chair): A lot  To walk in hospital room: Total  Climbing 3-5 steps with railing: Total  Basic Mobility - Total Score: 10    Encounter Problems       Encounter Problems (Active)       Balance       STG - Maintains dynamic standing balance with upper extremity support x 5' CGA  (Progressing)       Start:  12/08/23    Expected End:  12/22/23                 Mobility       STG - Patient will ambulate with 2WW 25' CGA  (Progressing)       Start:  12/08/23    Expected End:  12/22/23                   Transfers       STG - Patient will perform bed mobility CGA  (Progressing)       Start:  12/08/23    Expected End:  12/22/23            STG - Patient will transfer sit to and from stand CGA  (Progressing)       Start:  12/08/23    Expected End:  12/22/23                   Education Documentation  Precautions, taught by Siria Lyle PT at 12/8/2023  9:51 AM.  Learner: Patient  Readiness: Acceptance  Method: Explanation  Response: Verbalizes  Understanding    Body Mechanics, taught by Siria Lyle, PT at 12/8/2023  9:51 AM.  Learner: Patient  Readiness: Acceptance  Method: Explanation  Response: Verbalizes Understanding    Mobility Training, taught by Siria Lyle, FREDO at 12/8/2023  9:51 AM.  Learner: Patient  Readiness: Acceptance  Method: Explanation  Response: Verbalizes Understanding    Education Comments  No comments found.

## 2023-12-08 NOTE — CONSULTS
General/Trauma Surgery History and Physical      History Of Present Illness  Melecio Whaley is a 87 y.o. male who presents with multiple complaints. His son is bedside who reports concern for increased slurred speech and dropping his coffee cup. Pt complaints of very dry mouth, malodorous urine, increased dyspnea. Trauma is consulted because he reports falling off his bed yesterday. He was scooting down the edge of bed toward the foot of the bed when he ran out of bed and plopped onto the floor. He said it was a gentle fall. He did hit his head on a walker nearby. Did not lose consciousness and denies head or neck pain, back pain, chest pain, abdominal or hip pain. Denies new bruises or skin tears. He does complain of increased confusion and        Past Medical History   has a past medical history of CHF (congestive heart failure) (CMS/HCC), Depression, Heart disease, and Personal history of other diseases of the nervous system and sense organs.     Surgical History  Past Surgical History:   Procedure Laterality Date   • OTHER SURGICAL HISTORY  06/16/2021    Cataract surgery   • OTHER SURGICAL HISTORY  06/16/2021    Back surgery   • OTHER SURGICAL HISTORY  06/16/2021    Prostate biopsy   • OTHER SURGICAL HISTORY  06/16/2021    Knee surgery   • OTHER SURGICAL HISTORY  06/16/2021    Skin tag removal         Social History   reports that he has never smoked. He has never used smokeless tobacco. He reports that he does not drink alcohol and does not use drugs.     Family History  family history is not on file.     Allergies  Latex    Meds  Current Outpatient Medications   Medication Instructions   • buPROPion XL (Wellbutrin XL) 150 mg 24 hr tablet TAKE 1 TABLET BY MOUTH EVERY DAY   • citalopram (CELEXA) 40 mg, oral, Daily   • cyanocobalamin (VITAMIN B-12) 1,000 mcg, oral, Daily   • dilTIAZem XR (DILACOR XR) 180 mg, oral, Daily   • dilTIAZem XR (DILACOR XR) 240 mg, oral, Daily   • dofetilide (TIKOSYN) 500 mcg, oral,  "Daily   • furosemide (LASIX) 40 mg, oral, Every Monday, Wednesday & Friday   • gabapentin (NEURONTIN) 300 mg, oral, 2 times daily   • oxybutynin (Ditropan) 5 mg tablet Take 2 tablets (10 mg) by mouth 2 times a day.   • simvastatin (Zocor) 20 mg tablet TAKE 1 TABLET BY MOUTH EVERY DAY   • warfarin (Coumadin) 2.5 mg tablet 1 daily Monday and Friday        Review of Systems   A complete 10 point review of systems was performed and is negative except as noted in the history of present illness.     Last Recorded Vitals  Blood pressure 132/69, pulse 62, temperature 36.4 °C (97.5 °F), temperature source Temporal, resp. rate (!) 118, height 1.829 m (6' 0.01\"), weight 130 kg (286 lb 9.6 oz), SpO2 94 %.    Pain Score: 0 - No pain     Physical Exam  Constitutional: No acute distress. Sitting up in bed.  Neuro: Alert, oriented. Follows commands.   Eyes: Extraocular motions intact. No scleral icterus. Conjunctiva pink.   EENT: Mucous membranes moist. Normal dentition. Hears normal speaking voice.  Neck: Supple. No masses.  Cardiovascular: Regular rate. Palpable pulses bilaterally. No pitting edema.   Respiratory: No increased work of breathing or audible wheeze. Equal expansion.  Abdomen: Soft, obese abdomen. Nondistended. Nontender throughout. No appreciable hernias, masses or scars.  MSK: Moves all extremities. No atrophy.  Lymphatic: No palpable lymph nodes. No lymphedema.   Skin: Warm, dry, intact. No rashes or lesions.   Psychological: Appropriate mood and behavior.           Relevant Results  Laboratory Results:  CBC:   Lab Results   Component Value Date    WBC 14.4 (H) 12/08/2023    RBC 4.48 (L) 12/08/2023    HGB 13.3 (L) 12/08/2023    HCT 42.0 12/08/2023     12/08/2023       RFP:   Lab Results   Component Value Date     12/08/2023    K 4.2 12/08/2023     12/08/2023    CO2 28 12/08/2023    BUN 36 (H) 12/08/2023    CREATININE 1.32 (H) 12/08/2023    CALCIUM 8.6 12/08/2023    MG 2.41 (H) 12/07/2023    "     LFTs:   Lab Results   Component Value Date    PROT 6.7 12/07/2023    ALBUMIN 3.5 12/07/2023    BILITOT 0.5 12/07/2023    ALKPHOS 48 12/07/2023    AST 19 12/07/2023    ALT 12 12/07/2023         Imaging:  XR chest 1 view  Narrative: Interpreted By:  Clarita Faust,   STUDY:  XR CHEST 1 VIEW; 12/8/2023 9:57 am      INDICATION:  Signs/Symptoms:Repeat      COMPARISON:  Chest radiographs 02/23/2022.      ACCESSION NUMBER(S):  [id number]      ORDERING CLINICIAN:  DALE VALDES      TECHNIQUE:  Single frontal view of the chest performed.      FINDINGS:  LINES AND DEVICES:  None.      LUNGS:  Bilateral interstitial opacities, right slightly greater than left.  Bibasilar patchy airspace opacities. Obscure left costophrenic angle  by overlying soft tissue. No right pleural effusion. No pneumothorax.      CARDIOMEDIASTINAL SILHOUETTE:  Stable cardiomegaly.      Impression: Congestive heart failure with pulmonary edema and cardiomegaly.      MACRO  None      Signed by: Clarita Faust 12/8/2023 11:06 AM  Dictation workstation:   [id number]         Assessment/Plan   This is a 87 y.o. male who presents with multiple complaints and is admitted for a UTI, DENA and encephalopathy. Trauma is consulted because he fell off his bed yesterday. There are no traumatic injuries on imaging. He is getting an MRI of the brain today for stroke work up. If MRI is without bleed, ok to restart Coumadin.     Plan:  -- No further work up from trauma standpoint  -- Supportive care per primary  -- Restart Coumadin once MRI brain is cleared               Discussed with Dr. Dalton who is in agreement with plan. Please doc halo with questions.    Ami Logan PA-C

## 2023-12-08 NOTE — CONSULTS
Inpatient consult to Cardiology  Consult performed by: Teri Malik PA-C  Consult ordered by: VENUS Daly-CNP        History Of Present Illness:    Melecio Whaley is a 87 y.o. male presenting with multiple medical complaints. Patient states that he began to have hallucinations and was seeing and talking to people that were not there and would later disappear as he approached. Reports that for the last two weeks he has been less active due to shortness of breath, heavy legs. Reports that he noticed some improvement using his wife's oxygen. He takes lasix every other day and reports for the last week has been taking it daily. Denies any chest pain or pressure, dizziness, syncope or near syncope.      Last Recorded Vitals:  Vitals:    12/08/23 0430 12/08/23 0850 12/08/23 0851 12/08/23 0900   BP: 144/63   132/69   Patient Position:    Sitting   Pulse: 54   62   Resp: 18   (!) 118   Temp: 35.8 °C (96.4 °F)   36.4 °C (97.5 °F)   TempSrc: Temporal   Temporal   SpO2: 91% 92% 92% 94%   Weight:       Height:           Last Labs:  CBC - 12/8/2023:  6:30 AM  14.4 13.3 236    42.0      CMP - 12/8/2023:  6:30 AM  8.6 6.7 19 --- 0.5   _ 3.5 12 48      PTT - No results in last year.  1.3   14.6 _     BNP   Date/Time Value Ref Range Status   12/08/2023 06:30  (H) 0 - 99 pg/mL Final   11/09/2023 01:34  (H) 0 - 99 pg/mL Final     Hemoglobin A1C   Date/Time Value Ref Range Status   08/11/2022 11:25 AM 5.3 % Final     Comment:          Diagnosis of Diabetes-Adults   Non-Diabetic: < or = 5.6%   Increased risk for developing diabetes: 5.7-6.4%   Diagnostic of diabetes: > or = 6.5%  .       Monitoring of Diabetes                Age (y)     Therapeutic Goal (%)   Adults:          >18           <7.0   Pediatrics:    13-18           <7.5                   7-12           <8.0                   0- 6            7.5-8.5   American Diabetes Association. Diabetes Care 33(S1), Jan 2010.     07/23/2020 02:58 PM 5.5 4.3 -  5.6 % Final     Comment:     American Diabetes Association guidelines indicate that patients with HgbA1c in   the range 5.7-6.4% are at increased risk for development of diabetes, and   intervention by lifestyle modification may be beneficial. HgbA1c greater or   equal to 6.5% is considered diagnostic of diabetes.     LDL Calculated   Date/Time Value Ref Range Status   12/08/2023 06:30 AM 55 <=99 mg/dL Final     Comment:                                 Near   Borderline      AGE      Desirable  Optimal    High     High     Very High     0-19 Y     0 - 109     ---    110-129   >/= 130     ----    20-24 Y     0 - 119     ---    120-159   >/= 160     ----      >24 Y     0 -  99   100-129  130-159   160-189     >/=190       VLDL   Date/Time Value Ref Range Status   12/08/2023 06:30 AM 13 0 - 40 mg/dL Final   05/04/2023 10:04 AM 14 0 - 40 mg/dL Final   08/11/2022 11:25 AM 16 0 - 40 mg/dL Final   06/11/2021 09:45 AM 16 0 - 40 mg/dL Final      Last I/O:  No intake/output data recorded.    Past Cardiology Tests (Last 3 Years):  EKG:  Atrial flutter with slow response.     Echo:  Transthoracic Echo (TTE) Complete 12/08/2023  CONCLUSIONS:   1. Left ventricular systolic function is normal.   2. Moderately increased left ventricular septal thickness.   3. Moderate aortic valve stenosis.   4. Mildly elevated RVSP.    Ejection Fractions:  EF   Date/Time Value Ref Range Status   12/08/2023 11:10 AM 60       Cath:  No results found for this or any previous visit from the past 1095 days.    Stress Test:  No results found for this or any previous visit from the past 1095 days.    Cardiac Imaging:  No results found for this or any previous visit from the past 1095 days.      Past Medical History:  He has a past medical history of CHF (congestive heart failure) (CMS/HCC), Depression, Heart disease, and Personal history of other diseases of the nervous system and sense organs.    Past Surgical History:  He has a past surgical history  that includes Other surgical history (06/16/2021); Other surgical history (06/16/2021); Other surgical history (06/16/2021); Other surgical history (06/16/2021); and Other surgical history (06/16/2021).      Social History:  He reports that he has never smoked. He has never used smokeless tobacco. He reports that he does not drink alcohol and does not use drugs.    Family History:  Family History   Problem Relation Name Age of Onset    Coronary artery disease Neg Hx      Stroke Neg Hx          Allergies:  Latex    Inpatient Medications:  Scheduled medications   Medication Dose Route Frequency    acetaminophen  975 mg oral BID    [START ON 12/9/2023] aspirin  81 mg oral Daily    atorvastatin  40 mg oral Nightly    buPROPion XL  150 mg oral Daily    citalopram  40 mg oral Daily    furosemide  40 mg intravenous q12h    [Held by provider] furosemide  40 mg oral Once per day on Mon Wed Fri    gabapentin  100 mg oral TID    melatonin  3 mg oral Daily    oxybutynin  5 mg oral TID    perflutren protein A microsphere  0.5 mL intravenous Once in imaging    polyethylene glycol  17 g oral Daily    sulfur hexafluoride microsphr  2 mL intravenous Once in imaging    [Held by provider] warfarin  5 mg oral Daily     PRN medications   Medication    acetaminophen    zinc oxide     Continuous Medications   Medication Dose Last Rate    [Held by provider] sodium chloride 0.9%  100 mL/hr 100 mL/hr (12/08/23 1345)     Outpatient Medications:  Current Outpatient Medications   Medication Instructions    buPROPion XL (Wellbutrin XL) 150 mg 24 hr tablet TAKE 1 TABLET BY MOUTH EVERY DAY    citalopram (CELEXA) 40 mg, oral, Daily    cyanocobalamin (VITAMIN B-12) 1,000 mcg, oral, Daily    dilTIAZem XR (DILACOR XR) 180 mg, oral, Daily    dilTIAZem XR (DILACOR XR) 240 mg, oral, Daily    dofetilide (TIKOSYN) 500 mcg, oral, Daily    furosemide (LASIX) 40 mg, oral, Every Monday, Wednesday & Friday    gabapentin (NEURONTIN) 300 mg, oral, 2 times daily     oxybutynin (Ditropan) 5 mg tablet Take 2 tablets (10 mg) by mouth 2 times a day.    simvastatin (Zocor) 20 mg tablet TAKE 1 TABLET BY MOUTH EVERY DAY    warfarin (Coumadin) 2.5 mg tablet 1 daily Monday and Friday       Physical Exam:  Physical Exam  Constitutional:       Appearance: Normal appearance.   HENT:      Head: Normocephalic.      Nose: Nose normal.      Mouth/Throat:      Mouth: Mucous membranes are moist.   Eyes:      Conjunctiva/sclera: Conjunctivae normal.   Neck:      Comments: +JVD.   Musculoskeletal:      Right lower leg: Edema present.      Left lower leg: Edema present.   Skin:     General: Skin is warm and dry.   Neurological:      General: No focal deficit present.      Mental Status: He is alert. Mental status is at baseline.   Psychiatric:         Mood and Affect: Mood normal.         Behavior: Behavior normal.         Assessment/Plan   Melecio Whaley is a 86 yo male with a PMH of Afib/flutter, Chronic diastolic heart failure (HFpEF 55-60%), DLD, Depression, mild cognitive impairment who presented with AMS, Lower extremity edema, shortness of breath. Patients home medications showing Cardizem both 180 and 240mg daily, Tikosyn 500mg daily, Warfarin M and Friday. INR is subtherapeutic, pt is bradycardia and hypervolemic on exam.     #Acute on chronic decompensated diastolic heart failure (HFpEF 55-60%)  #Atrial flutter with slow response    -D/c IVF  -Start Lasix 40mg IV BID  -Strict I&O, daily weight  -D/c Tikosyn, Cardizem  -c/w home atorvastatin  -Monitor HR can resume if necessary but has been bradycardic  -Repeat echo recommended, reviewed with Normal LVSF, inc LV septal thickness, mod aortic stenosis, mildly elevated RVSP.  -Resume Warfarin INR goal 2.0-3.0  -Will follow.     Peripheral IV 12/07/23 20 G Left;Dorsal Hand (Active)   Site Assessment Clean;Dry;Intact 12/08/23 0800   Dressing Status Clean;Dry 12/08/23 0800   Number of days: 1       Code Status:  Full Code    I spent  minutes  in the professional and overall care of this patient.        Teri Malik PA-C

## 2023-12-08 NOTE — H&P
History Of Present Illness  Melecio Whaley is a 87 y.o. male (full code from AL) with multiple comorbidities (see hx) presenting with multiple complaints including new waxing and waning slurred speech with a very dry mouth, urinary incontinence at baseline with malodorous urine, increased dyspnea with wheezing and leg edema, recent fall out of bed a day ago while on coumadin. UA showed a likely UTI. DENA noted. INR was noted to be subtherapeutic. EKG did not show atrial fibrillation. No noted other focal deficits by family, but the patient is a little more confused per son. The patient has no further complaints in the ROS and denies lower back pain. The patient is not a reliable historian and most information obtained from son at bedside.    The patient's son (at bedside to collaborate story) was concerned when he went to visit him today and after the patient was in the middle of eating his breakfast and drinking OJ, he began to have slurred speech that has waxed and waned throughout the day. He passed a bedside swallow here by nursing. Son also noted he dropped his coffee mug earlier today with his dominant right hand. The patient's daughter was also concerned at dinnertime if he may have had a stroke (per son). The patient has been declining and previously his wife was his primary caregiver. She recently broke her hip and is at the same AL and can not care for him at this time. The patient was placed at this AL 4 days ago by the son. The patient does not know his medications and nor does his son. The patient was doing his own medications up until 4 days ago.    Med reconciliation from assisted living has some discrepancies with Cardizem (180 mg & 240 mg daily both ordered at AL), Tikosyn (?500 mg once a day), Coumadin? (Twice a week). The patient follows with Dr. Jose[facility] group, but appears to have not seen their office since the beginning of the year. Cannot verify that the patient was prescribed Tikosyn prior  to going to the assisted living.      Son at the bedside admits that his dad was not taking his Lasix at all and then he told him to start taking it daily prior to going into the assisted living 4 days ago d/t leg swelling. The patient was also using his wife's oxygen because he felt short of breath prior to going to the assisted living.  He has not been diagnosed with hypoxia and was not prescribed oxygen, but says he felt better when using it.      Past Medical History  He has a past medical history of diastolic CHF (congestive heart failure) (CMS/HCC), Depression, Heart disease, and atrial fib and on coumadin, neuropathy, NED and refuses CPAP, anxiety/depression, CAD, HPLD, dyspnea, cognitive impairment, HTN.    Surgical History  Cardiac ablation.     Social History  He reports that he has never smoked. He has never used smokeless tobacco. He reports that he does not drink alcohol and does not use drugs.  He resides [facility].  He uses a rollator.    Family History  Family History   Problem Relation Name Age of Onset    Coronary artery disease Neg Hx      Stroke Neg Hx          Allergies  Latex    Review of Systems   Constitutional:  Negative for chills and fever.   HENT:  Negative for drooling, rhinorrhea and sore throat.    Eyes:  Negative for redness and itching.   Respiratory:  Positive for shortness of breath and wheezing. Negative for cough.    Cardiovascular:  Positive for leg swelling. Negative for chest pain.   Gastrointestinal:  Negative for abdominal distention, diarrhea and vomiting.   Endocrine: Negative for polydipsia and polyuria.   Genitourinary:  Positive for urgency.        Incontinent at baseline   Musculoskeletal:  Negative for back pain and neck pain.   Skin:  Positive for pallor. Negative for rash and wound.   Neurological:  Positive for speech difficulty. Negative for dizziness, seizures and headaches.        Dropped coffee cup with right hand    Psychiatric/Behavioral:  Positive for confusion. Negative for agitation and behavioral problems.        Physical Exam  Constitutional:       General: He is not in acute distress.     Appearance: He is obese. He is not ill-appearing, toxic-appearing or diaphoretic.   HENT:      Head: Normocephalic and atraumatic.      Ears:      Comments: Very [place]     Mouth/Throat:      Mouth: Mucous membranes are dry.      Pharynx: Oropharynx is clear.   Eyes:      Extraocular Movements: Extraocular movements intact.      Conjunctiva/sclera: Conjunctivae normal.   Cardiovascular:      Rate and Rhythm: Regular rhythm. Bradycardia present.      Pulses: Normal pulses.      Heart sounds: Normal heart sounds. No murmur heard.     Comments: NO JVD or use of accessory muscles  Pulmonary:      Effort: Pulmonary effort is normal.      Breath sounds: Wheezing present. No rhonchi or rales.      Comments: +audible wheezes  Abdominal:      General: Bowel sounds are normal. There is no distension.      Palpations: Abdomen is soft.      Tenderness: There is no abdominal tenderness. There is no guarding or rebound.      Comments: NO CVA tenderness   Musculoskeletal:         General: Swelling present.      Cervical back: Neck supple.      Right lower leg: Edema present.      Left lower leg: Edema present.      Comments: Decreased and equal ROM in all extremities 4/5.   Skin:     General: Skin is warm and dry.      Coloration: Skin is pale.      Comments: Sacrum/buttocks pressure sore on admission   Neurological:      Mental Status: He is alert and oriented to person, place, and time.      Motor: Weakness present.      Comments: Generalized weakness in all extremeties: NIH 5 for dysarthria, aphasia and couldn't perform 2 tasks at once   Psychiatric:         Mood and Affect: Mood normal.         Behavior: Behavior normal.          Last Recorded Vitals  /77   Pulse (!) 48   Temp 36.4 °C (97.5 °F)   Resp 19   Wt 130 kg (287 lb 7.7 oz)    SpO2 95%     Relevant Results  Scheduled medications  acetaminophen, 975 mg, oral, BID  cefTRIAXone, 1 g, intravenous, Once  citalopram, 40 mg, oral, Daily  gabapentin, 300 mg, oral, BID  melatonin, 3 mg, oral, Daily  polyethylene glycol, 17 g, oral, Daily  simvastatin, 20 mg, oral, Daily  warfarin, 2.5 mg, oral, Daily      Continuous medications  sodium chloride 0.9%, 125 mL/hr  sodium chloride 0.9%, 100 mL/hr      PRN medications  PRN medications: acetaminophen    CT head W O contrast trauma protocol    Result Date: 12/7/2023  Interpreted By:  Minoo Zuniga, STUDY: CT HEAD W/O CONTRAST TRAUMA PROTOCOL; CT CERVICAL SPINE WO IV CONTRAST;  12/7/2023 8:26 pm   INDICATION: Signs/Symptoms:fall on thinners; Signs/Symptoms:fall with distracting injury.   COMPARISON: CT head dated 02/23/2022   ACCESSION NUMBER(S): [id number]; [id number]   ORDERING CLINICIAN: VANESSA PAREDES   TECHNIQUE: Noncontrast axial CT scan of head was performed, with coronal and sagittal reformats provided. The images were reviewed in bone, brain, blood and soft tissue windows.   Axial CT images of the cervical spine are obtained. Axial, coronal and sagittal reconstructions are provided for review.   FINDINGS: CT HEAD:   Exam is somewhat degraded by motion.   Within limitations of the study, no hyperdense intracranial hemorrhage is evident. There is no mass effect or midline shift.   Evaluation of the gray-white differentiation is limited due to motion, although within limitations of the study, no large vascular territory infarct is identified. Patchy attenuation changes present in the periventricular and subcortical white matter bilateral cerebral hemispheres are nonspecific, but favored to represent changes of microvascular disease.   No ventricular dilatation is present. Mild-to-moderate diffuse parenchymal volume loss is evident. No abnormal extra-axial fluid collections are identified.   Scalp soft tissues do not demonstrate any  acute abnormality. Calvarium is unremarkable in appearance, without evidence of depressed skull fracture. Mastoid air cells and middle ear cavities are well aerated without evidence of fluid fluid levels. Visualized paranasal sinuses are unremarkable in appearance.   CT C-SPINE:   There is straightening with reversal normal lordotic curvature of the cervical spine, with a 3-4 mm anterolisthesis of C3 on C4, and 1-2 mm anterolisthesis of C5 on C6.   Cervical vertebral body heights are intact, without evidence of compression fractures, although insufficiency endplate changes with associated Schmorl's nodes are present along the inferior endplate of C4 and superior endplate of C5.   Posterior elements of the cervical spine do not demonstrate any evidence of acute trauma. No abnormal intra spinous distance widening or displaced spinous or transverse process fractures are identified.   Craniocervical junction is intact with mild degenerative changes noted at the atlantoaxial articulation. Facet joints are preserved without evidence of subluxation or perching, although multilevel degenerate facet osteoarthropathy is present, most pronounced at C2-C3 and C3-C4, with bony fusion of C3-C4 on the left.   Multilevel intervertebral disc height loss is present, moderate at C4-C5 with large anterior disc osteophyte complex.   No high-grade spinal canal stenosis is evident, although at least mild spinal canal narrowing is present at the level of C3-C4 due to combination of spondylolisthesis and endplate spurring and C4-C5 due to disc osteophyte complex and hypertrophic facet changes.   Multilevel neural foraminal stenosis is present due to endplate spurring and hypertrophic facet changes, most pronounced at the level of C4-C5.   Prevertebral and paraspinal soft tissues do not demonstrate any evidence of acute trauma.       CT HEAD: 1. Within limitations of the study degraded by motion, no evidence of acute hemorrhage, depressed  skull fracture, or other acute intracranial trauma. 2. Moderate diffuse parenchymal volume loss with patchy attenuation changes present in the periventricular and subcortical white matter of bilateral cerebral hemispheres, favored to represent changes of microvascular disease.   CT C-SPINE: 1. No acute trauma to the cervical spine is identified. 2. Multilevel degenerative changes are present in the cervical spine, with at least mild-to-moderate spinal canal narrowing present at the level of C3-C4 and C4-C5 due to combination of spondylolisthesis, endplate spurring and hypertrophic facet changes.   MACRO: None   Signed by: Minoo Zuniga 12/7/2023 8:38 PM Dictation workstation:   [id number]    CT cervical spine wo IV contrast    Result Date: 12/7/2023  Interpreted By:  Minoo Zuniga, STUDY: CT HEAD W/O CONTRAST TRAUMA PROTOCOL; CT CERVICAL SPINE WO IV CONTRAST;  12/7/2023 8:26 pm   INDICATION: Signs/Symptoms:fall on thinners; Signs/Symptoms:fall with distracting injury.   COMPARISON: CT head dated 02/23/2022   ACCESSION NUMBER(S): [id number]; [id number]   ORDERING CLINICIAN: VANESSA PAREDES   TECHNIQUE: Noncontrast axial CT scan of head was performed, with coronal and sagittal reformats provided. The images were reviewed in bone, brain, blood and soft tissue windows.   Axial CT images of the cervical spine are obtained. Axial, coronal and sagittal reconstructions are provided for review.   FINDINGS: CT HEAD:   Exam is somewhat degraded by motion.   Within limitations of the study, no hyperdense intracranial hemorrhage is evident. There is no mass effect or midline shift.   Evaluation of the gray-white differentiation is limited due to motion, although within limitations of the study, no large vascular territory infarct is identified. Patchy attenuation changes present in the periventricular and subcortical white matter bilateral cerebral hemispheres are nonspecific, but favored to represent  changes of microvascular disease.   No ventricular dilatation is present. Mild-to-moderate diffuse parenchymal volume loss is evident. No abnormal extra-axial fluid collections are identified.   Scalp soft tissues do not demonstrate any acute abnormality. Calvarium is unremarkable in appearance, without evidence of depressed skull fracture. Mastoid air cells and middle ear cavities are well aerated without evidence of fluid fluid levels. Visualized paranasal sinuses are unremarkable in appearance.   CT C-SPINE:   There is straightening with reversal normal lordotic curvature of the cervical spine, with a 3-4 mm anterolisthesis of C3 on C4, and 1-2 mm anterolisthesis of C5 on C6.   Cervical vertebral body heights are intact, without evidence of compression fractures, although insufficiency endplate changes with associated Schmorl's nodes are present along the inferior endplate of C4 and superior endplate of C5.   Posterior elements of the cervical spine do not demonstrate any evidence of acute trauma. No abnormal intra spinous distance widening or displaced spinous or transverse process fractures are identified.   Craniocervical junction is intact with mild degenerative changes noted at the atlantoaxial articulation. Facet joints are preserved without evidence of subluxation or perching, although multilevel degenerate facet osteoarthropathy is present, most pronounced at C2-C3 and C3-C4, with bony fusion of C3-C4 on the left.   Multilevel intervertebral disc height loss is present, moderate at C4-C5 with large anterior disc osteophyte complex.   No high-grade spinal canal stenosis is evident, although at least mild spinal canal narrowing is present at the level of C3-C4 due to combination of spondylolisthesis and endplate spurring and C4-C5 due to disc osteophyte complex and hypertrophic facet changes.   Multilevel neural foraminal stenosis is present due to endplate spurring and hypertrophic facet changes, most  pronounced at the level of C4-C5.   Prevertebral and paraspinal soft tissues do not demonstrate any evidence of acute trauma.       CT HEAD: 1. Within limitations of the study degraded by motion, no evidence of acute hemorrhage, depressed skull fracture, or other acute intracranial trauma. 2. Moderate diffuse parenchymal volume loss with patchy attenuation changes present in the periventricular and subcortical white matter of bilateral cerebral hemispheres, favored to represent changes of microvascular disease.   CT C-SPINE: 1. No acute trauma to the cervical spine is identified. 2. Multilevel degenerative changes are present in the cervical spine, with at least mild-to-moderate spinal canal narrowing present at the level of C3-C4 and C4-C5 due to combination of spondylolisthesis, endplate spurring and hypertrophic facet changes.   MACRO: None   Signed by: Minoo Zuniga 12/7/2023 8:38 PM Dictation workstation:   [id number]      Results for orders placed or performed during the hospital encounter of 12/07/23 (from the past 24 hour(s))   CBC and Auto Differential   Result Value Ref Range    WBC 11.2 4.4 - 11.3 x10*3/uL    nRBC 0.0 0.0 - 0.0 /100 WBCs    RBC 4.60 4.50 - 5.90 x10*6/uL    Hemoglobin 13.8 13.5 - 17.5 g/dL    Hematocrit 43.6 41.0 - 52.0 %    MCV 95 80 - 100 fL    MCH 30.0 26.0 - 34.0 pg    MCHC 31.7 (L) 32.0 - 36.0 g/dL    RDW 15.5 (H) 11.5 - 14.5 %    Platelets 253 150 - 450 x10*3/uL    Neutrophils % 82.5 40.0 - 80.0 %    Immature Granulocytes %, Automated 0.4 0.0 - 0.9 %    Lymphocytes % 7.4 13.0 - 44.0 %    Monocytes % 6.8 2.0 - 10.0 %    Eosinophils % 2.4 0.0 - 6.0 %    Basophils % 0.5 0.0 - 2.0 %    Neutrophils Absolute 9.23 (H) 1.60 - 5.50 x10*3/uL    Immature Granulocytes Absolute, Automated 0.05 0.00 - 0.50 x10*3/uL    Lymphocytes Absolute 0.83 0.80 - 3.00 x10*3/uL    Monocytes Absolute 0.76 0.05 - 0.80 x10*3/uL    Eosinophils Absolute 0.27 0.00 - 0.40 x10*3/uL    Basophils Absolute 0.06  0.00 - 0.10 x10*3/uL   Comprehensive Metabolic Panel   Result Value Ref Range    Glucose 103 (H) 74 - 99 mg/dL    Sodium 140 136 - 145 mmol/L    Potassium 4.4 3.5 - 5.3 mmol/L    Chloride 102 98 - 107 mmol/L    Bicarbonate 31 21 - 32 mmol/L    Anion Gap 11 10 - 20 mmol/L    Urea Nitrogen 39 (H) 6 - 23 mg/dL    Creatinine 1.58 (H) 0.50 - 1.30 mg/dL    eGFR 43 (L) >60 mL/min/1.73m*2    Calcium 8.8 8.6 - 10.3 mg/dL    Albumin 3.5 3.4 - 5.0 g/dL    Alkaline Phosphatase 48 33 - 136 U/L    Total Protein 6.7 6.4 - 8.2 g/dL    AST 19 9 - 39 U/L    Bilirubin, Total 0.5 0.0 - 1.2 mg/dL    ALT 12 10 - 52 U/L   Alcohol   Result Value Ref Range    Alcohol <10 <=10 mg/dL   Lactate   Result Value Ref Range    Lactate 1.7 0.4 - 2.0 mmol/L   Protime-INR   Result Value Ref Range    Protime 14.3 (H) 9.8 - 12.8 seconds    INR 1.3 (H) 0.9 - 1.1   Type And Screen   Result Value Ref Range    ABO TYPE A     Rh TYPE POS     ANTIBODY SCREEN NEG    Drug Screen, Urine   Result Value Ref Range    Amphetamine Screen, Urine Presumptive Positive (A) Presumptive Negative    Barbiturate Screen, Urine Presumptive Negative Presumptive Negative    Benzodiazepines Screen, Urine Presumptive Negative Presumptive Negative    Cannabinoid Screen, Urine Presumptive Negative Presumptive Negative    Cocaine Metabolite Screen, Urine Presumptive Negative Presumptive Negative    Fentanyl Screen, Urine Presumptive Negative Presumptive Negative    Opiate Screen, Urine Presumptive Negative Presumptive Negative    Oxycodone Screen, Urine Presumptive Negative Presumptive Negative    PCP Screen, Urine Presumptive Negative Presumptive Negative   Urinalysis with Reflex Microscopic and Culture   Result Value Ref Range    Color, Urine Becca (N) Straw, Yellow    Appearance, Urine Hazy (N) Clear    Specific Gravity, Urine 1.032 1.005 - 1.035    pH, Urine 7.0 5.0, 5.5, 6.0, 6.5, 7.0, 7.5, 8.0    Protein, Urine 100 (2+) (N) NEGATIVE mg/dL    Glucose, Urine NEGATIVE NEGATIVE  mg/dL    Blood, Urine MODERATE (2+) (A) NEGATIVE    Ketones, Urine NEGATIVE NEGATIVE mg/dL    Bilirubin, Urine NEGATIVE NEGATIVE    Urobilinogen, Urine 2.0 (N) <2.0 mg/dL    Nitrite, Urine POSITIVE (A) NEGATIVE    Leukocyte Esterase, Urine MODERATE (2+) (A) NEGATIVE   Extra Urine Gray Tube   Result Value Ref Range    Extra Tube Hold for add-ons.    Microscopic Only, Urine   Result Value Ref Range    WBC, Urine >50 (A) 1-5, NONE /HPF    RBC, Urine >20 (A) NONE, 1-2, 3-5 /HPF    Bacteria, Urine 1+ (A) NONE SEEN /HPF    Fine Granular Casts, Urine 1+ (A) NONE /LPF    Calcium Oxalate Crystals, Urine 4+ (A) NONE, 1+ /HPF    Amorphous Crystals, Urine 1+ NONE, 1+, 2+ /HPF         Assessment/Plan   Principal Problem:  Encephalopathy  -May be from medications (recent changes and recently admitted to AL), from DENA, UTI and/or underlying stroke-rule out stroke, treat UTI, hydrate  Workup and treat above including stroke, if positive, complete stroke workup  Renal dose and hold altering drugs-decreased dose xanax and made prn, decrease gabapentin  Hold lasix  Neuro consult-appreciate rec  Added lipid panel, aspirin, high dose statin  Neuro checks    DENA (acute kidney injury) (CMS/HCC)  -Suspect overdiuresed and fluid overloaded  -Bladder scan Q 6 to check for retention; may need daniel if continues to retain, straight cath once  Hydrate, treat UTI and hold lasix for now  Recheck creatinine in morning  Resume ditropan    UTI  Urine culture-follow up  Blood culture-follow up  Rocephin    Fall and on Anticoagulation/Sub Therapeutic INR  Hold coumadin until stroke excluded and seen by trauma-appreciate consult  When coumadin restarted increased to 5 mg daily/Monitor INR  Fall precautions  PT/OT    Diastolic CHF with Exacerbation/HX Atrial Fib  -Currently dehydrated and think was over diuresed, BNP near 400 and overloaded on exam and mouth very dry  Think will need lasix adjusted, wasn't taking at all and then started taking  daily  ECHO-f/ollow up  Cardiac diet/daily wt  Cardiology Consulted and need reconciled meds-tikosyn and cardizem  Check Mg-follow up  Pulse ox/tele    Bilateral Sacral/Buttock Pressure Ulcer   Wound care  zinc    Depression/Anxiety  Decrease xanax to BID PRN  Wellbutrin, celexa resumed    Neuropathy  Neurontin    Full Code    Dvt PX  Coumadin to be resumed after trauma eval and stroke excluded  SCDs    Fatoumata Lebron, APRN-CNP

## 2023-12-08 NOTE — PROGRESS NOTES
12/08/23 1130   Discharge Planning   Living Arrangements Spouse/significant other;Other (Comment)  ([facility])   Support Systems Spouse/significant other   Assistance Needed patient is alert and oriented x3, [place], son reports sometimes needs assistance for ADL;s, has a rollator and a wheelchair   Type of Residence Assisted living;Skilled nursing facility   Care Facility Name [facility]   Home or Post Acute Services In home services;Post acute facilities (Rehab/SNF/etc)   Patient expects to be discharged to: return [facility] vs new SNF   Does the patient need discharge transport arranged? Yes     12/8/2023 1132: Spoke to son at bedside while patient was off the floor for testing. Endorses that he moved both his mother and father into [facility] at the [facility] within the last week and prefers discharge plan be to return. Explained that PT/OT evaluations would be completed prior to any decisions on next site of care be made.

## 2023-12-08 NOTE — CONSULTS
"Inpatient consult to Neurology  Consult performed by: Nirmal Wing MD  Consult ordered by: VENUS Daly-GIANFRANCO          History Of Present Illness  Melecio Whaley is a 87 y.o. male presenting with Acute confusion, generalized weakness, slurred speech, recent fall out of the bed, presenting with a likely urinary tract infection and DENA.  I obtained medical history from the patient's son at the bedside and reviewing the EMR.  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  He is not a reliable historian.  On 11/9/23 he was seen by his PCP: Dr. 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  He has been in an AL- \"[facility]", where his wife was temporarily placed as well for rehab after a hip fracture.  Past Medical History  Past Medical History:   Diagnosis Date    CHF (congestive heart failure) (CMS/[facility])     Depression     Heart disease     Personal history of other diseases of the nervous system and sense organs     History of neuropathy     Surgical History  Past Surgical History:   Procedure Laterality Date    OTHER SURGICAL HISTORY  06/16/2021    Cataract surgery    OTHER SURGICAL HISTORY  06/16/2021    Back surgery    OTHER SURGICAL HISTORY  06/16/2021    Prostate biopsy    OTHER SURGICAL HISTORY  06/16/2021    Knee surgery    OTHER SURGICAL HISTORY  06/16/2021    Skin tag removal     Social History  Social History     Tobacco Use    Smoking status: Never    Smokeless tobacco: Never   Vaping Use    Vaping Use: Never used    Passive vaping exposure: Yes   Substance Use Topics    Alcohol use: Never    Drug use: Never     Allergies  Latex  Medications Prior to Admission   Medication Sig Dispense Refill Last Dose    buPROPion XL (Wellbutrin XL) 150 mg 24 hr tablet TAKE 1 TABLET BY MOUTH EVERY DAY (Patient not taking: Reported on 12/8/2023) 90 tablet 3 Not Taking    citalopram (CeleXA) 40 mg tablet Take 1 tablet " (40 mg) by mouth once daily. 90 tablet 3 12/7/2023    cyanocobalamin (Vitamin B-12) 500 mcg tablet Take 2 tablets (1,000 mcg) by mouth once daily.   12/7/2023    dilTIAZem XR (Dilacor XR) 180 mg 24 hr capsule Take 1 capsule (180 mg) by mouth once daily.   12/7/2023    dilTIAZem XR (Dilacor XR) 240 mg 24 hr capsule Take 1 capsule (240 mg) by mouth once daily.   12/7/2023    dofetilide (Tikosyn) 500 mcg capsule Take 1 capsule (500 mcg) by mouth once daily.   12/7/2023    furosemide (Lasix) 40 mg tablet Take 1 tablet (40 mg) by mouth. Every Monday, Wednesday & Friday 12/7/2023    gabapentin (Neurontin) 300 mg capsule Take 1 capsule (300 mg) by mouth 2 times a day. 180 capsule 3 12/7/2023    oxybutynin (Ditropan) 5 mg tablet Take 2 tablets (10 mg) by mouth 2 times a day.   12/7/2023    simvastatin (Zocor) 20 mg tablet TAKE 1 TABLET BY MOUTH EVERY DAY 90 tablet 3 12/7/2023    warfarin (Coumadin) 2.5 mg tablet 1 daily Monday and Friday 24 tablet 3 12/7/2023       Review of Systems  Neurological Exam  Mental Status  Awake, alert and oriented to person, place and time. Mild dysarthria present. Language is fluent with no aphasia. Attention and concentration are normal.    Cranial Nerves  CN II: Visual fields full to confrontation.  CN III, IV, VI: Extraocular movements intact bilaterally.   Right pupil: 3 mm. Reactive to light. Reactive to accommodation.   Left pupil: 3 mm. Reactive to light. Reactive to accommodation.  CN V: Facial sensation is normal.  CN VII: Full and symmetric facial movement.  CN VIII: Hearing is normal.  CN IX, X: Palate elevates symmetrically  CN XI: Shoulder shrug strength is normal.  CN XII: Tongue midline without atrophy or fasciculations.    Motor  Normal muscle bulk throughout. No fasciculations present. Normal muscle tone. No abnormal involuntary movements. Strength is 5/5 throughout all four extremities.    Sensory  Light touch is normal in upper and lower extremities. Pinprick is normal in  "upper and lower extremities. Proprioception is normal in upper and lower extremities.     Reflexes                                            Right                      Left  Biceps                                 0                         0  Triceps                                0                         0  Patellar                                0                         0  Achilles                                0                         0  Right Plantar: downgoing  Left Plantar: downgoing    Coordination  Right: Finger-to-nose normal.Left: Finger-to-nose normal.    Gait    Deferred gait evaluation due to loss of balance and recent fall..    Physical Exam  Eyes:      Extraocular Movements: Extraocular movements intact.   Neurological:      Cranial Nerves: Dysarthria present.      Motor: Motor strength is normal.     Deep Tendon Reflexes:      Reflex Scores:       Tricep reflexes are 0 on the right side and 0 on the left side.       Bicep reflexes are 0 on the right side and 0 on the left side.       Patellar reflexes are 0 on the right side and 0 on the left side.       Achilles reflexes are 0 on the right side and 0 on the left side.      Last Recorded Vitals  Blood pressure 132/69, pulse 62, temperature 36.4 °C (97.5 °F), temperature source Temporal, resp. rate (!) 118, height 1.829 m (6' 0.01\"), weight 130 kg (286 lb 9.6 oz), SpO2 94 %.    Relevant Results        NIH Stroke Scale  1A. Level of Consciousness: Alert, Keenly Responsive  1B. Ask Month and Age: Both Questions Right  1C. Blink Eyes & Squeeze Hands: Performs Both Tasks  2. Best Gaze: Normal  3. Visual: No Visual Loss  4. Facial Palsy: Normal Symmetrical Movements  5A. Motor - Left Arm: No Drift  5B. Motor - Right Arm: No Drift  6A. Motor - Left Leg: No Drift  6B. Motor - Right Leg: No Drift  7. Limb Ataxia: Absent  8. Sensory Loss: Normal  9. Best Language: No Aphasia  10. Dysarthria: Mild-to-Moderate Dysarthria  11. Extinction and Inattention: No " Abnormality  NIH Stroke Scale: 1           Isidro Coma Scale  Best Eye Response: Spontaneous  Best Verbal Response: Oriented  Best Motor Response: Follows commands  Hyndman Coma Scale Score: 15                 I have personally reviewed the following imaging results XR chest 1 view    Result Date: 12/8/2023  Interpreted By:  Clarita Faust, STUDY: XR CHEST 1 VIEW; 12/8/2023 9:57 am   INDICATION: Signs/Symptoms:Repeat   COMPARISON: Chest radiographs 02/23/2022.   ACCESSION NUMBER(S): [id number]   ORDERING CLINICIAN: DALE VALDES   TECHNIQUE: Single frontal view of the chest performed.   FINDINGS: LINES AND DEVICES: None.   LUNGS: Bilateral interstitial opacities, right slightly greater than left. Bibasilar patchy airspace opacities. Obscure left costophrenic angle by overlying soft tissue. No right pleural effusion. No pneumothorax.   CARDIOMEDIASTINAL SILHOUETTE: Stable cardiomegaly.       Congestive heart failure with pulmonary edema and cardiomegaly.   MACRO None   Signed by: Clarita Faust 12/8/2023 11:06 AM Dictation workstation:   [id number]    CT head W O contrast trauma protocol    Result Date: 12/7/2023  Interpreted By:  Minoo Zuniga, STUDY: CT HEAD W/O CONTRAST TRAUMA PROTOCOL; CT CERVICAL SPINE WO IV CONTRAST;  12/7/2023 8:26 pm   INDICATION: Signs/Symptoms:fall on thinners; Signs/Symptoms:fall with distracting injury.   COMPARISON: CT head dated 02/23/2022   ACCESSION NUMBER(S): [id number]; [id number]   ORDERING CLINICIAN: VANESSA PAREDES   TECHNIQUE: Noncontrast axial CT scan of head was performed, with coronal and sagittal reformats provided. The images were reviewed in bone, brain, blood and soft tissue windows.   Axial CT images of the cervical spine are obtained. Axial, coronal and sagittal reconstructions are provided for review.   FINDINGS: CT HEAD:   Exam is somewhat degraded by motion.   Within limitations of the study, no hyperdense intracranial hemorrhage is evident. There is  no mass effect or midline shift.   Evaluation of the gray-white differentiation is limited due to motion, although within limitations of the study, no large vascular territory infarct is identified. Patchy attenuation changes present in the periventricular and subcortical white matter bilateral cerebral hemispheres are nonspecific, but favored to represent changes of microvascular disease.   No ventricular dilatation is present. Mild-to-moderate diffuse parenchymal volume loss is evident. No abnormal extra-axial fluid collections are identified.   Scalp soft tissues do not demonstrate any acute abnormality. Calvarium is unremarkable in appearance, without evidence of depressed skull fracture. Mastoid air cells and middle ear cavities are well aerated without evidence of fluid fluid levels. Visualized paranasal sinuses are unremarkable in appearance.   CT C-SPINE:   There is straightening with reversal normal lordotic curvature of the cervical spine, with a 3-4 mm anterolisthesis of C3 on C4, and 1-2 mm anterolisthesis of C5 on C6.   Cervical vertebral body heights are intact, without evidence of compression fractures, although insufficiency endplate changes with associated Schmorl's nodes are present along the inferior endplate of C4 and superior endplate of C5.   Posterior elements of the cervical spine do not demonstrate any evidence of acute trauma. No abnormal intra spinous distance widening or displaced spinous or transverse process fractures are identified.   Craniocervical junction is intact with mild degenerative changes noted at the atlantoaxial articulation. Facet joints are preserved without evidence of subluxation or perching, although multilevel degenerate facet osteoarthropathy is present, most pronounced at C2-C3 and C3-C4, with bony fusion of C3-C4 on the left.   Multilevel intervertebral disc height loss is present, moderate at C4-C5 with large anterior disc osteophyte complex.   No high-grade  spinal canal stenosis is evident, although at least mild spinal canal narrowing is present at the level of C3-C4 due to combination of spondylolisthesis and endplate spurring and C4-C5 due to disc osteophyte complex and hypertrophic facet changes.   Multilevel neural foraminal stenosis is present due to endplate spurring and hypertrophic facet changes, most pronounced at the level of C4-C5.   Prevertebral and paraspinal soft tissues do not demonstrate any evidence of acute trauma.       CT HEAD: 1. Within limitations of the study degraded by motion, no evidence of acute hemorrhage, depressed skull fracture, or other acute intracranial trauma. 2. Moderate diffuse parenchymal volume loss with patchy attenuation changes present in the periventricular and subcortical white matter of bilateral cerebral hemispheres, favored to represent changes of microvascular disease.   CT C-SPINE: 1. No acute trauma to the cervical spine is identified. 2. Multilevel degenerative changes are present in the cervical spine, with at least mild-to-moderate spinal canal narrowing present at the level of C3-C4 and C4-C5 due to combination of spondylolisthesis, endplate spurring and hypertrophic facet changes.   MACRO: None   Signed by: Minoo Zuniga 12/7/2023 8:38 PM Dictation workstation:   [id number]    CT cervical spine wo IV contrast    Result Date: 12/7/2023  Interpreted By:  Minoo Zuniga, STUDY: CT HEAD W/O CONTRAST TRAUMA PROTOCOL; CT CERVICAL SPINE WO IV CONTRAST;  12/7/2023 8:26 pm   INDICATION: Signs/Symptoms:fall on thinners; Signs/Symptoms:fall with distracting injury.   COMPARISON: CT head dated 02/23/2022   ACCESSION NUMBER(S): [id number]; [id number]   ORDERING CLINICIAN: VANESSA PAREDES   TECHNIQUE: Noncontrast axial CT scan of head was performed, with coronal and sagittal reformats provided. The images were reviewed in bone, brain, blood and soft tissue windows.   Axial CT images of the cervical spine  are obtained. Axial, coronal and sagittal reconstructions are provided for review.   FINDINGS: CT HEAD:   Exam is somewhat degraded by motion.   Within limitations of the study, no hyperdense intracranial hemorrhage is evident. There is no mass effect or midline shift.   Evaluation of the gray-white differentiation is limited due to motion, although within limitations of the study, no large vascular territory infarct is identified. Patchy attenuation changes present in the periventricular and subcortical white matter bilateral cerebral hemispheres are nonspecific, but favored to represent changes of microvascular disease.   No ventricular dilatation is present. Mild-to-moderate diffuse parenchymal volume loss is evident. No abnormal extra-axial fluid collections are identified.   Scalp soft tissues do not demonstrate any acute abnormality. Calvarium is unremarkable in appearance, without evidence of depressed skull fracture. Mastoid air cells and middle ear cavities are well aerated without evidence of fluid fluid levels. Visualized paranasal sinuses are unremarkable in appearance.   CT C-SPINE:   There is straightening with reversal normal lordotic curvature of the cervical spine, with a 3-4 mm anterolisthesis of C3 on C4, and 1-2 mm anterolisthesis of C5 on C6.   Cervical vertebral body heights are intact, without evidence of compression fractures, although insufficiency endplate changes with associated Schmorl's nodes are present along the inferior endplate of C4 and superior endplate of C5.   Posterior elements of the cervical spine do not demonstrate any evidence of acute trauma. No abnormal intra spinous distance widening or displaced spinous or transverse process fractures are identified.   Craniocervical junction is intact with mild degenerative changes noted at the atlantoaxial articulation. Facet joints are preserved without evidence of subluxation or perching, although multilevel degenerate facet  osteoarthropathy is present, most pronounced at C2-C3 and C3-C4, with bony fusion of C3-C4 on the left.   Multilevel intervertebral disc height loss is present, moderate at C4-C5 with large anterior disc osteophyte complex.   No high-grade spinal canal stenosis is evident, although at least mild spinal canal narrowing is present at the level of C3-C4 due to combination of spondylolisthesis and endplate spurring and C4-C5 due to disc osteophyte complex and hypertrophic facet changes.   Multilevel neural foraminal stenosis is present due to endplate spurring and hypertrophic facet changes, most pronounced at the level of C4-C5.   Prevertebral and paraspinal soft tissues do not demonstrate any evidence of acute trauma.       CT HEAD: 1. Within limitations of the study degraded by motion, no evidence of acute hemorrhage, depressed skull fracture, or other acute intracranial trauma. 2. Moderate diffuse parenchymal volume loss with patchy attenuation changes present in the periventricular and subcortical white matter of bilateral cerebral hemispheres, favored to represent changes of microvascular disease.   CT C-SPINE: 1. No acute trauma to the cervical spine is identified. 2. Multilevel degenerative changes are present in the cervical spine, with at least mild-to-moderate spinal canal narrowing present at the level of C3-C4 and C4-C5 due to combination of spondylolisthesis, endplate spurring and hypertrophic facet changes.   MACRO: None   Signed by: Minoo Zuniga 12/7/2023 8:38 PM Dictation workstation:   [id number]  . Reviewed, and agree.     Assessment/Plan   Principal Problem:    DENA (acute kidney injury) (CMS/[facility])  Active Problems:    Altered mental status, unspecified altered mental status type      Impression: 87 year old man with likely acute metabolic encephalopathy, related to urinary tract infection, doubt MRI will show an acute ischemic stroke.  Will follow up neurology exam, as patient is improving,  and will need rehab after discharge.       I spent 60 minutes in the professional and overall care of this patient.      Nirmal Wing MD

## 2023-12-08 NOTE — ED PROCEDURE NOTE
Procedure  Critical Care    Performed by: Ryan Merida MD  Authorized by: Ryan Merida MD    Critical care provider statement:     Critical care time (minutes):  35    Critical care time was exclusive of:  Separately billable procedures and treating other patients    Critical care was necessary to treat or prevent imminent or life-threatening deterioration of the following conditions:  Trauma    Critical care was time spent personally by me on the following activities:  Development of treatment plan with patient or surrogate, evaluation of patient's response to treatment, obtaining history from patient or surrogate, ordering and review of laboratory studies, ordering and review of radiographic studies, pulse oximetry and re-evaluation of patient's condition               Ryan Merida MD  12/07/23 1954

## 2023-12-08 NOTE — ED PROVIDER NOTES
HPI   Chief Complaint   Patient presents with    Altered Mental Status     Pt from [facility] at [facility]. Per pt son he has been slurring speech and not making sense at times. Pt's daughter states that pt has been talking more than normal. Per pt's son pt is mentally very sharp but was put in assited living because of mobility issues. Pt's son states that he had a fall two days ago and is on blood thinners.        Melecio is a 87-year-old male who was fine at breakfast according to the son.  Son then said sometime after that he has had very thick tongue difficulty speaking and confusion.  He recently is living at the end of [facility] and is taking his medicines as prescribed.  He has a history of atrial fibrillation and is on Coumadin.  He has a history of mild cognitive impairment and depression.  He denies any fever chills cough or cold denies any pain.  He has some peripheral edema as well.  His son reports that he fell approximately 1-2 nights ago at the facility.  Unknown if he hit his head.  Since he takes Coumadin and fell he will be worked up as a HIA.  CT scan labs requested.                          Dandridge Coma Scale Score: 15         NIH Stroke Scale: 1          Patient History   Past Medical History:   Diagnosis Date    Personal history of other diseases of the nervous system and sense organs     History of neuropathy     Past Surgical History:   Procedure Laterality Date    OTHER SURGICAL HISTORY  06/16/2021    Cataract surgery    OTHER SURGICAL HISTORY  06/16/2021    Back surgery    OTHER SURGICAL HISTORY  06/16/2021    Prostate biopsy    OTHER SURGICAL HISTORY  06/16/2021    Knee surgery    OTHER SURGICAL HISTORY  06/16/2021    Skin tag removal     No family history on file.  Social History     Tobacco Use    Smoking status: Never    Smokeless tobacco: Never   Substance Use Topics    Alcohol use: Not on file    Drug use: Not on file       Physical Exam   ED Triage Vitals   Temp Pulse Resp BP   -- -- -- --       SpO2 Temp src Heart Rate Source Patient Position   -- -- -- --      BP Location FiO2 (%)     -- --       Physical Exam  Vitals reviewed.   Constitutional:       General: He is awake.      Appearance: Normal appearance.   HENT:      Head: Normocephalic.      Nose: Nose normal.      Mouth/Throat:      Comments: Very dry lips and mouth.  Midline uvula tongue is not enlarged.  Cardiovascular:      Rate and Rhythm: Normal rate and regular rhythm.   Pulmonary:      Effort: Pulmonary effort is normal.      Breath sounds: Normal breath sounds.   Abdominal:      Palpations: Abdomen is soft.   Musculoskeletal:      Cervical back: Normal range of motion.      Comments: Marked lower extremity bilaterally.   Skin:     General: Skin is warm.      Capillary Refill: Capillary refill takes less than 2 seconds.   Neurological:      Mental Status: He is alert.      Comments: Patient has decreased hearing but is able to follow commands.  He knows his age and location.  Month he got wrong.  He is joking with me.  He is following commands.    Patient's speech is off he is talking as if he has very thick tongue but his words are making sense.         ED Course & [facility]   ED Course as of 12/07/23 2149   Thu Dec 07, 2023   1957 Worked up in the emergency room for a fall on thinners.  In addition he has very thick tongue speech. [RZ]   1958 I reviewed the paperwork from the and on the [facility] facility including his medication list.  Spoke to the son at the bedside. [RZ]   2004 EKG done at 1951 interpreted by me shows a flutter with a right bundle branch block this is different than the interpretation on the EKG.  This is similar to previous EKG April 18, 2023 no obvious ischemia [RZ]   2148 Found to have an DENA.  Patient is slightly improved with regard to his speech.  He will be hospitalized and treated.  We are awaiting his urine results.  Spoke to the hospitalist agrees with plan.  Spoke to patient's nephew at the bedside and the patient.  [RZ]      ED Course User Index  [RZ] Ryan Merida MD         Diagnoses as of 12/07/23 2149   Altered mental status, unspecified altered mental status type   DENA (acute kidney injury) (CMS/[facility])       Medical Decision Making      Procedure  Procedures     Ryan Merida MD  12/07/23 2149

## 2023-12-08 NOTE — PROGRESS NOTES
Pharmacy Medication History Review    Melecio Whaley is a 87 y.o. male admitted for No Principal Problem: There is no principal problem currently on the Problem List. Please update the Problem List and refresh.. Pharmacy reviewed the patient's [id number] medications and allergies for accuracy.    The list below reflectives the updated PTA list. Please review each medication in order reconciliation for additional clarification and justification.  (Not in a hospital admission)       The list below reflectives the updated allergy list. Please review each documented allergy for additional clarification and justification.  Allergies  Reviewed by Ami Johnson CPhT on 12/7/2023        Severity Reactions Comments    Latex Not Specified Other             Below are additional concerns with the patient's PTA list.      Ami Johnson CPhT

## 2023-12-08 NOTE — PROGRESS NOTES
Occupational Therapy    Evaluation    Patient Name: Melecio Whaley  MRN: [id number]  Today's Date: 12/8/2023  Time Calculation  Start Time: 0851  Stop Time: 0910  Time Calculation (min): 19 min    Assessment  IP OT Assessment  OT Assessment: Pt is a 86 y/o male who was admitted for multipl medical complaints. Pt presents with decreased sitting/standing balance, decreased cognition, endurance, and strength. Pt to benefit from skilled OT services to increase independence with ADL's, transfers, and mobility.  Prognosis: Good  Barriers to Discharge: None  Evaluation/Treatment Tolerance: Patient limited by fatigue  Medical Staff Made Aware: Yes  End of Session Communication: Bedside nurse  End of Session Patient Position: Up in chair, Alarm on  Plan:  Treatment Interventions: ADL retraining, Functional transfer training, UE strengthening/ROM, Endurance training, Cognitive reorientation  OT Frequency: 3 times per week  OT Discharge Recommendations: Moderate intensity level of continued care  Equipment Recommended upon Discharge: Wheeled walker  OT Recommended Transfer Status: Minimal assist, Moderate assist, Assist of 2  OT - OK to Discharge: Yes    Subjective   Current Problem:  1. Altered mental status, unspecified altered mental status type        2. DENA (acute kidney injury) (CMS/HCC)        3. Acute diastolic congestive heart failure (CMS/HCC)  Transthoracic Echo (TTE) Complete    Transthoracic Echo (TTE) Complete        General:  General  Reason for Referral: Pt is a 86 y/o male  presenting with multiple complaints including new waxing and waning slurred speech with a very dry mouth, urinary incontinence at baseline with malodorous urine, increased dyspnea with wheezing and leg edema, and recent fall out of bed a day ago  Past Medical History Relevant to Rehab: Past medical history of diastolic CHF (congestive heart failure) (CMS/HCC), Depression, Heart disease, and atrial fib and on coumadin, neuropathy, NED and  refuses CPAP, anxiety/depression, CAD, HPLD, dyspnea, cognitive impairment, HTN.  Family/Caregiver Present: No  Co-Treatment: PT  Co-Treatment Reason: To maximize pt's outcomes  Prior to Session Communication: Bedside nurse, PCT/NA/CTA  Patient Position Received: Bed, 3 rail up, Alarm on  General Comment: Pt agreeable to OT eval. Pt oriented x2 and is a questionable historian. Pt currently requiring 2 people for transfer and mobility.  Precautions:  Medical Precautions: Fall precautions (telemetry, IV, alarms)  Vital Signs:  SpO2: 92 %  Pain:  Pain Assessment  Pain Assessment: 0-10  Pain Score: 0 - No pain    Objective   Cognition:  Overall Cognitive Status: Impaired  Orientation Level: Disoriented to situation, Disoriented to place  Safety/Judgement: Exceptions to WFL     Home Living:  Type of Home: Assisted living (Pt recently started living at [facility] at the [facility])  Lives With: Spouse (Pt reports his spouse lives with him at the AL)  Home Adaptive Equipment: Walker rolling or standard, Wheelchair-manual  Home Access: No concerns   Prior Function:  Level of [place]: Needs assistance with homemaking, Independent with ADLs and functional transfers  Receives Help From: Other (Comment) (Staff at facility)  ADL Assistance: Independent (Pt reports being independent with ADL's but unclear on his PLOF)  Homemaking Assistance: Needs assistance (Facility does IADL's)  Ambulatory Assistance: Independent (Pt reports using a walker or wheelchair)    ADL:  LE Dressing Assistance: Total  LE Dressing Deficit: Don/doff R sock, Don/doff L sock, Thread LLE into underwear, Thread RLE into underwear, Thread LLE into pants, Thread RLE into pants, Pull up over hips  ADL Comments: Anticipate pt to need max-total A for other LB ADL's  Activity Tolerance:  Endurance: Tolerates less than 10 min exercise, no significant change in vital signs (Pt wheezing during session, but SpO2 was WFL)  Bed Mobility/Transfers:   Bed Mobility  Bed  Mobility: Yes  Bed Mobility 1  Bed Mobility 1: Supine to sitting  Level of Assistance 1: Maximum assistance, Moderate verbal cues  Bed Mobility Comments 1: assist for guiding LE's and trunk with use of bedrail    Transfers  Transfer: Yes  Transfer 1  Transfer From 1: Sit to  Transfer to 1: Stand  Technique 1: Sit to stand  Transfer Device 1: Walker  Transfer Level of Assistance 1: Minimum assistance, Moderate assistance, Moderate verbal cues (Min A x 1 + mod A x 1)    Ambulation/Gait Training:  Ambulation/Gait Training  Ambulation/Gait Training Performed: Yes  Ambulation/Gait Training 1  Comments/Distance (ft) 1: Pt ambulated with FWW from the bed to the chair with min A x 1 and mod A x 1  Sitting Balance:  Static Sitting Balance  Static Sitting-Balance Support: Bilateral upper extremity supported, Feet supported  Static Sitting-Level of Assistance: Maximum assistance  Dynamic Sitting Balance  Dynamic Sitting-Balance Support: Bilateral upper extremity supported, Feet supported  Dynamic Sitting-Comments: Max A  Standing Balance:  Static Standing Balance  Static Standing-Balance Support: Bilateral upper extremity supported  Static Standing-Level of Assistance:  (Min A x 2 with FWW)  Dynamic Standing Balance  Dynamic Standing-Balance Support: Bilateral upper extremity supported  Dynamic Standing-Comments: Min A x1 + mod A x1    Strength:  Strength Comments: Grossly 3/5 bilaterally    Extremities: RUE   RUE : Within Functional Limits (based on observation) and LUE   LUE: Within Functional Limits (based on observation)    Outcome Measures: [facility] Daily Activity  Putting on and taking off regular lower body clothing: Total  Bathing (including washing, rinsing, drying): A lot  Putting on and taking off regular upper body clothing: A lot  Toileting, which includes using toilet, bedpan or urinal: Total  Taking care of personal grooming such as brushing teeth: A little  Eating Meals: A little  Daily Activity - Total Score:  12      Education Documentation  Body Mechanics, taught by Lizbeth Kilgore OT at 12/8/2023  9:43 AM.  Learner: Patient  Readiness: Acceptance  Method: Explanation  Response: Verbalizes Understanding, Needs Reinforcement    Education Comments  No comments found.      Goals:   Encounter Problems       Encounter Problems (Active)       Balance       Pt will demo GOOD sitting balance and FAIR+ static/dynamic standing balance during ADL and functional activity with FWW >2 minutes with UE support for improved independence and participation in ADL        Start:  12/08/23    Expected End:  12/22/23               Dressing Upper Extremities       Patient will dress upper body independently        Start:  12/08/23    Expected End:  12/22/23               Dressings Lower Extremities       Patient to complete lower body dressing with AE as needed at min A        Start:  12/08/23    Expected End:  12/22/23               Endurance       Pt will increase endurance to tolerate 10 min of activity with no more than 1 rest break in order to increase ability to engage in ADL completion.        Start:  12/08/23    Expected End:  12/22/23               Mobility       Pt will demo increased functional mobility to tolerate tasks necessary to complete ADL routine with FWW at min A x 1       Start:  12/08/23    Expected End:  12/22/23               Toileting       Patient will complete toileting tasks with FWW at min A x 1       Start:  12/08/23    Expected End:  12/22/23               Transfers       Pt to demonstrate bed mobility at C[facility]       Start:  12/08/23    Expected End:  12/22/23            Pt to demonstrate transfers with FWW at min A x1       Start:  12/08/23    Expected End:  12/22/23

## 2023-12-09 LAB
ANION GAP SERPL CALC-SCNC: 14 MMOL/L (ref 10–20)
BUN SERPL-MCNC: 32 MG/DL (ref 6–23)
CALCIUM SERPL-MCNC: 8.7 MG/DL (ref 8.6–10.3)
CHLORIDE SERPL-SCNC: 103 MMOL/L (ref 98–107)
CO2 SERPL-SCNC: 30 MMOL/L (ref 21–32)
CREAT SERPL-MCNC: 1.38 MG/DL (ref 0.5–1.3)
ERYTHROCYTE [DISTWIDTH] IN BLOOD BY AUTOMATED COUNT: 15.6 % (ref 11.5–14.5)
GFR SERPL CREATININE-BSD FRML MDRD: 50 ML/MIN/1.73M*2
GLUCOSE SERPL-MCNC: 94 MG/DL (ref 74–99)
HCT VFR BLD AUTO: 42.1 % (ref 41–52)
HGB BLD-MCNC: 13.2 G/DL (ref 13.5–17.5)
INR PPP: 1.3 (ref 0.9–1.1)
MCH RBC QN AUTO: 30 PG (ref 26–34)
MCHC RBC AUTO-ENTMCNC: 31.4 G/DL (ref 32–36)
MCV RBC AUTO: 96 FL (ref 80–100)
NRBC BLD-RTO: 0 /100 WBCS (ref 0–0)
PLATELET # BLD AUTO: 238 X10*3/UL (ref 150–450)
POTASSIUM SERPL-SCNC: 3.9 MMOL/L (ref 3.5–5.3)
PROTHROMBIN TIME: 14.7 SECONDS (ref 9.8–12.8)
RBC # BLD AUTO: 4.4 X10*6/UL (ref 4.5–5.9)
SODIUM SERPL-SCNC: 143 MMOL/L (ref 136–145)
WBC # BLD AUTO: 12.1 X10*3/UL (ref 4.4–11.3)

## 2023-12-09 PROCEDURE — 85610 PROTHROMBIN TIME: CPT | Performed by: FAMILY MEDICINE

## 2023-12-09 PROCEDURE — 80048 BASIC METABOLIC PNL TOTAL CA: CPT | Performed by: FAMILY MEDICINE

## 2023-12-09 PROCEDURE — 99233 SBSQ HOSP IP/OBS HIGH 50: CPT | Performed by: NURSE PRACTITIONER

## 2023-12-09 PROCEDURE — 2500000002 HC RX 250 W HCPCS SELF ADMINISTERED DRUGS (ALT 637 FOR MEDICARE OP, ALT 636 FOR OP/ED): Performed by: NURSE PRACTITIONER

## 2023-12-09 PROCEDURE — 2500000001 HC RX 250 WO HCPCS SELF ADMINISTERED DRUGS (ALT 637 FOR MEDICARE OP): Performed by: NURSE PRACTITIONER

## 2023-12-09 PROCEDURE — 99233 SBSQ HOSP IP/OBS HIGH 50: CPT | Performed by: PSYCHIATRY & NEUROLOGY

## 2023-12-09 PROCEDURE — 1200000002 HC GENERAL ROOM WITH TELEMETRY DAILY

## 2023-12-09 PROCEDURE — 36415 COLL VENOUS BLD VENIPUNCTURE: CPT | Performed by: FAMILY MEDICINE

## 2023-12-09 PROCEDURE — 99231 SBSQ HOSP IP/OBS SF/LOW 25: CPT | Performed by: NURSE PRACTITIONER

## 2023-12-09 PROCEDURE — 2500000004 HC RX 250 GENERAL PHARMACY W/ HCPCS (ALT 636 FOR OP/ED): Performed by: PHYSICIAN ASSISTANT

## 2023-12-09 PROCEDURE — 94640 AIRWAY INHALATION TREATMENT: CPT

## 2023-12-09 PROCEDURE — 85027 COMPLETE CBC AUTOMATED: CPT | Performed by: FAMILY MEDICINE

## 2023-12-09 PROCEDURE — 82374 ASSAY BLOOD CARBON DIOXIDE: CPT | Performed by: FAMILY MEDICINE

## 2023-12-09 PROCEDURE — 2500000004 HC RX 250 GENERAL PHARMACY W/ HCPCS (ALT 636 FOR OP/ED): Performed by: NURSE PRACTITIONER

## 2023-12-09 RX ORDER — CEFTRIAXONE 1 G/50ML
1 INJECTION, SOLUTION INTRAVENOUS EVERY 24 HOURS
Status: DISCONTINUED | OUTPATIENT
Start: 2023-12-09 | End: 2023-12-10

## 2023-12-09 RX ADMIN — GABAPENTIN 100 MG: 100 CAPSULE ORAL at 21:33

## 2023-12-09 RX ADMIN — CEFTRIAXONE SODIUM 1 G: 1 INJECTION, SOLUTION INTRAVENOUS at 18:44

## 2023-12-09 RX ADMIN — ACETAMINOPHEN 975 MG: 325 TABLET ORAL at 21:32

## 2023-12-09 RX ADMIN — CITALOPRAM HYDROBROMIDE 40 MG: 20 TABLET ORAL at 09:25

## 2023-12-09 RX ADMIN — Medication 3 MG: at 21:32

## 2023-12-09 RX ADMIN — BUPROPION HYDROCHLORIDE 150 MG: 150 TABLET, FILM COATED, EXTENDED RELEASE ORAL at 09:26

## 2023-12-09 RX ADMIN — ACETAMINOPHEN 975 MG: 325 TABLET ORAL at 09:25

## 2023-12-09 RX ADMIN — IPRATROPIUM BROMIDE AND ALBUTEROL SULFATE 3 ML: 2.5; .5 SOLUTION RESPIRATORY (INHALATION) at 20:07

## 2023-12-09 RX ADMIN — ATORVASTATIN CALCIUM 40 MG: 40 TABLET, FILM COATED ORAL at 21:32

## 2023-12-09 RX ADMIN — IPRATROPIUM BROMIDE AND ALBUTEROL SULFATE 3 ML: 2.5; .5 SOLUTION RESPIRATORY (INHALATION) at 11:09

## 2023-12-09 RX ADMIN — GABAPENTIN 100 MG: 100 CAPSULE ORAL at 09:25

## 2023-12-09 RX ADMIN — FUROSEMIDE 40 MG: 10 INJECTION, SOLUTION INTRAMUSCULAR; INTRAVENOUS at 15:49

## 2023-12-09 RX ADMIN — IPRATROPIUM BROMIDE AND ALBUTEROL SULFATE 3 ML: 2.5; .5 SOLUTION RESPIRATORY (INHALATION) at 14:29

## 2023-12-09 RX ADMIN — GABAPENTIN 100 MG: 100 CAPSULE ORAL at 15:49

## 2023-12-09 RX ADMIN — FUROSEMIDE 40 MG: 10 INJECTION, SOLUTION INTRAMUSCULAR; INTRAVENOUS at 01:45

## 2023-12-09 RX ADMIN — ASPIRIN 81 MG: 81 TABLET, COATED ORAL at 09:30

## 2023-12-09 ASSESSMENT — COGNITIVE AND FUNCTIONAL STATUS - GENERAL
DAILY ACTIVITIY SCORE: 16
DAILY ACTIVITIY SCORE: 16
DRESSING REGULAR UPPER BODY CLOTHING: A LITTLE
MOVING FROM LYING ON BACK TO SITTING ON SIDE OF FLAT BED WITH BEDRAILS: A LITTLE
MOBILITY SCORE: 12
DRESSING REGULAR LOWER BODY CLOTHING: A LOT
STANDING UP FROM CHAIR USING ARMS: A LOT
TURNING FROM BACK TO SIDE WHILE IN FLAT BAD: A LOT
WALKING IN HOSPITAL ROOM: A LOT
TOILETING: A LOT
MOVING FROM LYING ON BACK TO SITTING ON SIDE OF FLAT BED WITH BEDRAILS: A LITTLE
PERSONAL GROOMING: A LITTLE
WALKING IN HOSPITAL ROOM: A LOT
HELP NEEDED FOR BATHING: A LOT
CLIMB 3 TO 5 STEPS WITH RAILING: TOTAL
TURNING FROM BACK TO SIDE WHILE IN FLAT BAD: A LOT
PERSONAL GROOMING: A LITTLE
DRESSING REGULAR UPPER BODY CLOTHING: A LITTLE
DRESSING REGULAR LOWER BODY CLOTHING: A LOT
CLIMB 3 TO 5 STEPS WITH RAILING: TOTAL
MOVING TO AND FROM BED TO CHAIR: A LOT
MOVING TO AND FROM BED TO CHAIR: A LOT
MOBILITY SCORE: 12
HELP NEEDED FOR BATHING: A LOT
TOILETING: A LOT
STANDING UP FROM CHAIR USING ARMS: A LOT

## 2023-12-09 ASSESSMENT — PAIN SCALES - GENERAL
PAINLEVEL_OUTOF10: 0 - NO PAIN
PAINLEVEL_OUTOF10: 0 - NO PAIN

## 2023-12-09 ASSESSMENT — PAIN SCALES - WONG BAKER: WONGBAKER_NUMERICALRESPONSE: NO HURT

## 2023-12-09 NOTE — PROGRESS NOTES
Subjective Data:  Resting in bed, complains of swelling in legs. Denies chest pain or shortness of breath. Has difficulty hearing.     Overnight Events:    No acute overnight      Objective Data:  Last Recorded Vitals:  Vitals:    12/08/23 2000 12/08/23 2039 12/09/23 0527 12/09/23 0645   BP:  152/79 142/69    BP Location:  Left arm Left arm    Patient Position:  Lying Lying    Pulse:  64 70    Resp:  20 20    Temp:  36.3 °C (97.3 °F) 36.2 °C (97.2 °F)    TempSrc:  Temporal Temporal    SpO2: 96% 96% 98%    Weight:    123 kg (270 lb 8.1 oz)   Height:           Last Labs:  CBC - 12/9/2023:  6:09 AM  12.1 13.2 238    42.1      CMP - 12/9/2023:  6:09 AM  8.7 6.7 19 --- 0.5   _ 3.5 12 48      PTT - No results in last year.  1.3   15.1 _     BNP   Date/Time Value Ref Range Status   12/08/2023 06:30  0 - 99 pg/mL Final   11/09/2023 01:34  0 - 99 pg/mL Final     HGBA1C   Date/Time Value Ref Range Status   08/11/2022 11:25 AM 5.3 % Final     Comment:          Diagnosis of Diabetes-Adults   Non-Diabetic: < or = 5.6%   Increased risk for developing diabetes: 5.7-6.4%   Diagnostic of diabetes: > or = 6.5%  .       Monitoring of Diabetes                Age (y)     Therapeutic Goal (%)   Adults:          >18           <7.0   Pediatrics:    13-18           <7.5                   7-12           <8.0                   0- 6            7.5-8.5   American Diabetes Association. Diabetes Care 33(S1), Jan 2010.     07/23/2020 02:58 PM 5.5 4.3 - 5.6 % Final     Comment:     American Diabetes Association guidelines indicate that patients with HgbA1c in   the range 5.7-6.4% are at increased risk for development of diabetes, and   intervention by lifestyle modification may be beneficial. HgbA1c greater or   equal to 6.5% is considered diagnostic of diabetes.     LDLCALC   Date/Time Value Ref Range Status   12/08/2023 06:30 AM 55 <=99 mg/dL Final     Comment:                                 Near   Borderline      AGE      Desirable   Optimal    High     High     Very High     0-19 Y     0 - 109     ---    110-129   >/= 130     ----    20-24 Y     0 - 119     ---    120-159   >/= 160     ----      >24 Y     0 -  99   100-129  130-159   160-189     >/=190       VLDL   Date/Time Value Ref Range Status   12/08/2023 06:30 AM 13 0 - 40 mg/dL Final   05/04/2023 10:04 AM 14 0 - 40 mg/dL Final   08/11/2022 11:25 AM 16 0 - 40 mg/dL Final   06/11/2021 09:45 AM 16 0 - 40 mg/dL Final      Last I/O:  I/O last 3 completed shifts:  In: 1240 (10.1 mL/kg) [P.O.:240; I.V.:1000 (8.2 mL/kg)]  Out: 1650 (13.4 mL/kg) [Urine:1650 (0.4 mL/kg/hr)]  Weight: 122.7 kg     Past Cardiology Tests (Last 3 Years):  EKG:  No results found for this or any previous visit from the past 1095 days.    Echo:  Transthoracic Echo (TTE) Complete 12/08/2023  1. Left ventricular systolic function is normal.   2. Moderately increased left ventricular septal thickness.   3. Moderate aortic valve stenosis.   4. Mildly elevated RVSP.    Ejection Fractions:  EF   Date/Time Value Ref Range Status   12/08/2023 11:10 AM 60       Cath:  No results found for this or any previous visit from the past 1095 days.    Stress Test:  No results found for this or any previous visit from the past 1095 days.    Cardiac Imaging:  No results found for this or any previous visit from the past 1095 days.      Inpatient Medications:  Scheduled medications   Medication Dose Route Frequency    acetaminophen  975 mg oral BID    aspirin  81 mg oral Daily    atorvastatin  40 mg oral Nightly    buPROPion XL  150 mg oral Daily    citalopram  40 mg oral Daily    furosemide  40 mg intravenous q12h    gabapentin  100 mg oral TID    ipratropium-albuteroL  3 mL nebulization TID    melatonin  3 mg oral Daily    [Held by provider] oxybutynin  5 mg oral TID    perflutren protein A microsphere  0.5 mL intravenous Once in imaging    polyethylene glycol  17 g oral Daily    sulfur hexafluoride microsphr  2 mL intravenous Once in  imaging    warfarin  5 mg oral Daily     PRN medications   Medication    acetaminophen    albuterol    zinc oxide     Continuous Medications   Medication Dose Last Rate    [Held by provider] sodium chloride 0.9%  100 mL/hr 100 mL/hr (12/08/23 1625)       Physical Exam:  Physical Exam  Vitals reviewed.   HENT:      Head: Normocephalic.      Nose: Nose normal.   Eyes:      Pupils: Pupils are equal, round, and reactive to light.   Cardiovascular:      Rate controlled Regularly Irregular   Pulmonary:      Effort: Pulmonary effort is normal.      Breath sounds: Normal breath sounds.   Abdominal:      General: Abdomen is flat.      Palpations: Abdomen is soft.   Musculoskeletal:         General: Normal range of motion.      Cervical back: Normal range of motion.   Skin:     General: Skin is warm and dry.   Neurological:      General: No focal deficit present.      Mental Status: He is alert and oriented to person, place, and time.   Psychiatric:         Mood and Affect: Mood normal.    Neck +JVP   Extremities bilateral lower extremity edema         Assessment/Plan   Melecio Whaley is a 88 yo male with a PMH of Afib/flutter, Chronic diastolic heart failure (HFpEF 55-60%), DLD, Depression, mild cognitive impairment who presented with AMS, Lower extremity edema, shortness of breath. Patients home medications showing Cardizem both 180 and 240mg daily, Tikosyn 500mg daily, Warfarin M and Friday. Heart rate is well controlled, volume overloaded.     #Acute on chronic decompensated diastolic heart failure (HFpEF 55-60%)  #Atrial flutter with slow response     -Continue Lasix 40mg IV BID  -Strict I&O, daily weight  -c/w home atorvastatin  -Monitor HR can resume if necessary but has been bradycardic  -Resume Warfarin INR goal 2.0-3.0  -Will follow.   Peripheral IV 12/07/23 20 G Left;Dorsal Hand (Active)   Site Assessment Clean;Dry;Intact 12/09/23 0800   Dressing Status Clean;Dry 12/09/23 0800   Number of days: 2       Code  Status:  Full Code    I spent minutes in the professional and overall care of this patient.        Sonja Ruelas, APRN-CNP

## 2023-12-09 NOTE — PROGRESS NOTES
Jean Claude Allen is a 87 y.o. male on day 2 of admission presenting with DENA (acute kidney injury) (CMS/[facility]).      Subjective        Objective     Last Recorded Vitals  /78 (BP Location: Left arm, Patient Position: Lying)   Pulse 67   Temp 36.2 °C (97.2 °F) (Temporal)   Resp 20   Wt 123 kg (270 lb 8.1 oz)   SpO2 99%   Intake/Output last 3 Shifts:    Intake/Output Summary (Last 24 hours) at 12/9/2023 1513  Last data filed at 12/9/2023 1000  Gross per 24 hour   Intake 120 ml   Output 1250 ml   Net -1130 ml         Admission Weight  Weight: 130 kg (287 lb 7.7 oz) (12/07/23 1935)    Daily Weight  12/09/23 : 123 kg (270 lb 8.1 oz)    Image Results  Transthoracic Echo (TTE) Complete     [facility], [address] [place] [address]                Tel [phone] and Fax [phone]    TRANSTHORACIC ECHOCARDIOGRAM REPORT       Patient Name:      JEAN CLAUDE ALLEN       Reading Physician:    [id number] Avery Kay MD  Study Date:        12/8/2023            Ordering Provider:    [id number] PAO SWANSON  MRN/PID:           [id number]             Fellow:  Accession#:        [id number]         Nurse:                Milagros Santos RN  Date of Birth/Age: 1935 / 87      Sonographer:          Shannon beverly RDCS  Gender:            M                    Additional Staff:  Height:            182.88 cm            Admit Date:           12/7/2023  Weight:            129.73 kg            Admission Status:     Inpatient -                                                                Routine  BSA:               2.48 m2              Encounter#:           [id number]                                          Department Location:  [facility] Non                                                                Invasive  Blood Pressure: 144 /63 mmHg    Study Type:    TRANSTHORACIC ECHO (TTE)  COMPLETE  Diagnosis/ICD: Acute combined systolic (congestive) and diastolic (congestive)                 heart failure (CHF)-I50.41  Indication:    Congestive Heart Failure  CPT Code:      Echo Complete w Full Doppler-94235    Patient History:  Pertinent         Dyspnea, A-Fib, Anticoagulation, CAD, HTN, Hyperlipidemia and  History:          CHF.    Study Detail: The following Echo studies were performed: 2D, M-Mode, Doppler and                color flow. Definity used as a contrast agent for endocardial                border definition. Total contrast used for this procedure was 1 mL                via IV push.       PHYSICIAN INTERPRETATION:  Left Ventricle: The left ventricular systolic function is normal. The calculated ejection fraction is normal at 60 % using the Lui's Bi-plane MOD calculation. There are no regional wall motion abnormalities. The left ventricular cavity size is normal. The left ventricular septal wall thickness is moderately increased. Left ventricular diastolic filling was not assessed.  Left Atrium: The left atrium is upper limits of normal in size.  Right Ventricle: The right ventricle was not well visualized. There is normal right ventricular global systolic function.  Right Atrium: The right atrium was not well visualized.  Aortic Valve: The aortic valve is trileaflet. There is evidence of moderate aortic valve stenosis.  There is a low aortic valve gradient, with a normal ejection fraction. There is no evidence of aortic valve regurgitation. The peak instantaneous gradient of the aortic valve is 18.0 mmHg. The mean gradient of the aortic valve is 12.3 mmHg.  Mitral Valve: The mitral valve is normal in structure. There is no evidence of mitral valve regurgitation.  Tricuspid Valve: The tricuspid valve is structurally normal. There is trace tricuspid regurgitation. The Doppler estimated RVSP is mildly elevated at 54.3 mmHg.  Pulmonic Valve: The pulmonic valve is not well visualized.  The pulmonic valve regurgitation was not well visualized.  Pericardium: There is no pericardial effusion noted.  Aorta: The aortic root is normal.  In comparison to the previous echocardiogram(s): Compared with study from 2021, no significant change.       CONCLUSIONS:   1. Left ventricular systolic function is normal.   2. Moderately increased left ventricular septal thickness.   3. Moderate aortic valve stenosis.   4. Mildly elevated RVSP.    QUANTITATIVE DATA SUMMARY:  2D MEASUREMENTS:                            Normal Ranges:  IVSd:          1.55 cm    (0.6-1.1cm)  LVPWd:         1.81 cm    (0.6-1.1cm)  LVIDd:         4.17 cm    (3.9-5.9cm)  LVIDs:         2.90 cm  LV Mass Index: 118.8 g/m2  LV % FS        30.5 %    LA VOLUME:                               Normal Ranges:  LA Vol A4C:       74.0 ml    (22+/-6mL/m2)  LA Vol A2C:       79.9 ml  LA Vol BP:        80.8 ml  LA Vol Index A4C: 29.8 ml/m2  LA Vol Index A2C: 32.2 ml/m2  LA Vol Index BP:  32.6 ml/m2  LA Volume Index:  32.6 ml/m2  LA Vol A4C:       67.4 ml  LA Vol A2C:       77.1 ml    M-MODE MEASUREMENTS:                   Normal Ranges:  Ao Root: 3.20 cm (2.0-3.7cm)  LAs:     4.37 cm (2.7-4.0cm)    LV SYSTOLIC FUNCTION BY 2D PLANIMETRY (MOD):                      Normal Ranges:  EF-A4C View: 61.1 % (>=55%)  EF-A2C View: 59.7 %  EF-Biplane:  60.2 %    LV DIASTOLIC FUNCTION:                      Normal Ranges:  MV Peak E: 1.27 m/s (0.7-1.2 m/s)  MV Peak A: 0.48 m/s (0.42-0.7 m/s)  E/A Ratio: 2.64     (1.0-2.2)    MITRAL VALVE:                  Normal Ranges:  MV DT: 238 msec (150-240msec)    AORTIC VALVE:                                     Normal Ranges:  AoV Vmax:                2.12 m/s  (<=1.7m/s)  AoV Peak P.0 mmHg (<20mmHg)  AoV Mean P.3 mmHg (1.7-11.5mmHg)  LVOT Max Rico:            0.73 m/s  (<=1.1m/s)  AoV VTI:                 48.40 cm  (18-25cm)  LVOT VTI:                15.58 cm  LVOT Diameter:           2.03  cm   (1.8-2.4cm)  AoV Area, VTI:           1.05 cm2  (2.5-5.5cm2)  AoV Area,Vmax:           1.13 cm2  (2.5-4.5cm2)  AoV Dimensionless Index: 0.32    TRICUSPID VALVE/RVSP:                              Normal Ranges:  Peak TR Velocity: 3.58 m/s  RV Syst Pressure: 54.3 mmHg (< 30mmHg)    PULMONIC VALVE:                       Normal Ranges:  PV Max Rico: 1.0 m/s  (0.6-0.9m/s)  PV Max P.3 mmHg       [id number] Avery Kay MD  Electronically signed on 2023 at 1:11:58 PM       ** Final **  XR chest 1 view  Narrative: Interpreted By:  Clarita Faust,   STUDY:  XR CHEST 1 VIEW; 2023 9:57 am      INDICATION:  Signs/Symptoms:Repeat      COMPARISON:  Chest radiographs 2022.      ACCESSION NUMBER(S):  [id number]      ORDERING CLINICIAN:  DALE VALDES      TECHNIQUE:  Single frontal view of the chest performed.      FINDINGS:  LINES AND DEVICES:  None.      LUNGS:  Bilateral interstitial opacities, right slightly greater than left.  Bibasilar patchy airspace opacities. Obscure left costophrenic angle  by overlying soft tissue. No right pleural effusion. No pneumothorax.      CARDIOMEDIASTINAL SILHOUETTE:  Stable cardiomegaly.      Impression: Congestive heart failure with pulmonary edema and cardiomegaly.      MACRO  None      Signed by: Clarita Faust 2023 11:06 AM  Dictation workstation:   [id number]      Physical Exam  Constitutional:       Appearance: He is obese.   HENT:      Nose: Nose normal.      Mouth/Throat:      Mouth: Mucous membranes are dry.   Eyes:      Extraocular Movements: Extraocular movements intact.   Neck:      Vascular: No JVD.   Cardiovascular:      Rate and Rhythm: Rhythm irregular.      Heart sounds: No murmur heard.  Pulmonary:      Effort: No respiratory distress.      Breath sounds: Normal breath sounds.   Abdominal:      General: There is distension.   Genitourinary:     Comments: Hematuria (gross)  Musculoskeletal:      Right lower le+ Pitting Edema present.      Left  lower le+ Pitting Edema present.   Skin:     Comments: Sacral pressure ulcer (see pictures)   Neurological:      General: No focal deficit present.      Mental Status: He is alert and oriented to person, place, and time. Mental status is at baseline.      Motor: Weakness present.      Comments: Dysarthria likely due to poor dentition and dry oral mucosa. Generalized weakness.       Results for orders placed or performed during the hospital encounter of 23 (from the past 24 hour(s))   Protime-INR   Result Value Ref Range    Protime 15.1 (H) 9.8 - 12.8 seconds    INR 1.3 (H) 0.9 - 1.1   CBC   Result Value Ref Range    WBC 12.1 (H) 4.4 - 11.3 x10*3/uL    nRBC 0.0 0.0 - 0.0 /100 WBCs    RBC 4.40 (L) 4.50 - 5.90 x10*6/uL    Hemoglobin 13.2 (L) 13.5 - 17.5 g/dL    Hematocrit 42.1 41.0 - 52.0 %    MCV 96 80 - 100 fL    MCH 30.0 26.0 - 34.0 pg    MCHC 31.4 (L) 32.0 - 36.0 g/dL    RDW 15.6 (H) 11.5 - 14.5 %    Platelets 238 150 - 450 x10*3/uL   Basic Metabolic Panel   Result Value Ref Range    Glucose 94 74 - 99 mg/dL    Sodium 143 136 - 145 mmol/L    Potassium 3.9 3.5 - 5.3 mmol/L    Chloride 103 98 - 107 mmol/L    Bicarbonate 30 21 - 32 mmol/L    Anion Gap 14 10 - 20 mmol/L    Urea Nitrogen 32 (H) 6 - 23 mg/dL    Creatinine 1.38 (H) 0.50 - 1.30 mg/dL    eGFR 50 (L) >60 mL/min/1.73m*2    Calcium 8.7 8.6 - 10.3 mg/dL   Protime-INR   Result Value Ref Range    Protime 14.7 (H) 9.8 - 12.8 seconds    INR 1.3 (H) 0.9 - 1.1       Relevant Results             No current facility-administered medications on file prior to encounter.     Current Outpatient Medications on File Prior to Encounter   Medication Sig Dispense Refill    buPROPion XL (Wellbutrin XL) 150 mg 24 hr tablet TAKE 1 TABLET BY MOUTH EVERY DAY (Patient not taking: Reported on 2023) 90 tablet 3    citalopram (CeleXA) 40 mg tablet Take 1 tablet (40 mg) by mouth once daily. 90 tablet 3    cyanocobalamin (Vitamin B-12) 500 mcg tablet Take 2 tablets (1,000  mcg) by mouth once daily.      dilTIAZem XR (Dilacor XR) 180 mg 24 hr capsule Take 1 capsule (180 mg) by mouth once daily.      dilTIAZem XR (Dilacor XR) 240 mg 24 hr capsule Take 1 capsule (240 mg) by mouth once daily.      dofetilide (Tikosyn) 500 mcg capsule Take 1 capsule (500 mcg) by mouth once daily.      furosemide (Lasix) 40 mg tablet Take 1 tablet (40 mg) by mouth. Every Monday, Wednesday & Friday      gabapentin (Neurontin) 300 mg capsule Take 1 capsule (300 mg) by mouth 2 times a day. 180 capsule 3    oxybutynin (Ditropan) 5 mg tablet Take 2 tablets (10 mg) by mouth 2 times a day.      simvastatin (Zocor) 20 mg tablet TAKE 1 TABLET BY MOUTH EVERY DAY 90 tablet 3    warfarin (Coumadin) 2.5 mg tablet 1 daily Monday and Friday 24 tablet 3    [DISCONTINUED] acetaminophen (Tylenol) 500 mg tablet Take 2 tablets (1,000 mg) by mouth 2 times a day.      [DISCONTINUED] ALPRAZolam (Xanax) 1 mg tablet Take 1 tablet (1 mg) by mouth 3 times a day as needed.      [DISCONTINUED] buPROPion SR (Wellbutrin SR) 150 mg 12 hr tablet Take 1 tablet (150 mg) by mouth once daily. Do not crush, chew, or split.      [DISCONTINUED] cholecalciferol (Vitamin D-3) 50 mcg (2,000 unit) capsule Take 1 capsule (50 mcg) by mouth early in the morning..      [DISCONTINUED] dilTIAZem LA (Cardizem LA) 240 mg 24 hr tablet Take 1 tablet (240 mg) by mouth once daily.      [DISCONTINUED] Matzim  mg 24 hr tablet Take 1 tablet (180 mg) by mouth once daily.        Scheduled medications  acetaminophen, 975 mg, oral, BID  aspirin, 81 mg, oral, Daily  atorvastatin, 40 mg, oral, Nightly  buPROPion XL, 150 mg, oral, Daily  citalopram, 40 mg, oral, Daily  furosemide, 40 mg, intravenous, q12h  gabapentin, 100 mg, oral, TID  ipratropium-albuteroL, 3 mL, nebulization, TID  melatonin, 3 mg, oral, Daily  [Held by provider] oxybutynin, 5 mg, oral, TID  perflutren protein A microsphere, 0.5 mL, intravenous, Once in imaging  polyethylene glycol, 17 g, oral,  Daily  sulfur hexafluoride microsphr, 2 mL, intravenous, Once in imaging  [Held by provider] warfarin, 5 mg, oral, Daily      Continuous medications  [Held by provider] sodium chloride 0.9%, 100 mL/hr, Last Rate: 100 mL/hr (12/08/23 1155)      PRN medications  PRN medications: acetaminophen, albuterol, zinc oxide     Assessment/Plan                  Principal Problem:    DENA (acute kidney injury) (CMS/[facility])  Active Problems:    Altered mental status, unspecified altered mental status type      Acute encephalopathy   -Likely due to infectious process with UTI and dehydration  -MRI brain and MRA head and neck results pending (cancelled patient unable to lie flat)  -Neurology adds that stroke is unlikely recommends PT  -Lipid panel  -ASA and statin added   -Monitor for increase of symptoms; son says he is at baseline    Gross hematuria  Urine retention  UTI, UC gram negative bacilli  -Possibly due to trauma  -Urethral catheter inserted for retention and hematuria noted  -Irrigate until clear, if not insert 3 way daniel  -Type and screen  -H&H in the morning  -Held ditropan and warfarin  -Started ceftriaxone   -Consider urology if not better    Diastolic HF exacerbation  Atrial fibrillation   -Pitting edema noted  -Cardiology recommends Lasix 40mg IVP BID and holding Cardizem and Tikosyn for now.    -Telemetry monitoring   -Echocardiogram shows hypervolemia  -Holding warfarin  -Strict intake and output    DENA  -Improving  -Continuing the furosemide   -Monitor renal function  -Consider nephrology consult if worsens       Mechanical fall   -No injury two days ago trauma has signed off   -Will resume coumadin     Sacral wound  -Does not appear infected  -Wound care      DVT prophylaxis  -Continue coumadin        Dispo: PT will re-evaluate the patient once he is stable to determine where he goes after discharge.     Erin Bennett, APRN-CNP

## 2023-12-09 NOTE — PROGRESS NOTES
"Melecio Whaley is a 87 y.o. male on day 2 of admission presenting with DENA (acute kidney injury) (CMS/[facility]).      Subjective   No complaints.       Objective     Last Recorded Vitals  Blood pressure 151/78, pulse 67, temperature 36.2 °C (97.2 °F), temperature source Temporal, resp. rate 20, height 1.829 m (6' 0.01\"), weight 123 kg (270 lb 8.1 oz), SpO2 99 %.    Physical Exam  Neurological Exam  Alert, pleasant, oriented x 2, follows commands,lifts upper extremities well.  Relevant Results        NIH Stroke Scale  1A. Level of Consciousness: Alert, Keenly Responsive  1B. Ask Month and Age: Both Questions Right  1C. Blink Eyes & Squeeze Hands: Performs Both Tasks  2. Best Gaze: Normal  3. Visual: No Visual Loss  4. Facial Palsy: Normal Symmetrical Movements  5A. Motor - Left Arm: No Drift  5B. Motor - Right Arm: No Drift  6A. Motor - Left Leg: No Drift  6B. Motor - Right Leg: No Drift  7. Limb Ataxia: Absent  8. Sensory Loss: Normal  9. Best Language: No Aphasia  10. Dysarthria: Mild-to-Moderate Dysarthria  11. Extinction and Inattention: No Abnormality  NIH Stroke Scale: 1           [place] Coma Scale  Best Eye Response: Spontaneous  Best Verbal Response: Oriented  Best Motor Response: Follows commands  Isidro Coma Scale Score: 15                             Assessment/Plan      Principal Problem:    DENA (acute kidney injury) (CMS/[facility])  Active Problems:    Altered mental status, unspecified altered mental status type    Impression: resolved acute metabolic encephalopathy, related to a urinary tract infection.  His urinary culture grew out >100,000 gram negative bacilli.  He also had DENA.  Since he is improving, will sign off.         I spent 25 minutes in the professional and overall care of this patient.      Nirmal Wing MD  "

## 2023-12-09 NOTE — PROGRESS NOTES
12/09/23 1252   Discharge Planning   Living Arrangements Spouse/significant other;Other (Comment)  ([facility] with spouse)   Support Systems Spouse/significant other   Assistance Needed Patient is A&O x3, [place], son reports some assistance is needed for ADLs. Patient has a WC and rollator he uses for ambulation.   Type of Residence Assisted living   Care Facility Name [facility]   Who is requesting discharge planning? Provider   Home or Post Acute Services In home services   Type of Post Acute Facility Services Assisted living   Type of Home Care Services Home OT;Home PT   Patient expects to be discharged to: [facility]   Patient Choice   Provider Choice list and CMS website (https://medicare.gov/care-compare#search) for post-acute Quality and Resource Measure Data were provided and reviewed with: Family   Patient / Family choosing to utilize agency / facility established prior to hospitalization Yes     12/09/2023 1200 Call placed to admissions at [facility] to inquire about patient returning to AL per son's request rather than SNF. Per admissions director Laurie, she will take TCC contact information and have their  or DON call this TCC to discuss if patient is appropriate to return at this time due to PT/OT recommendations for moderate intensity therapy at [place].

## 2023-12-09 NOTE — PROGRESS NOTES
Physical Therapy                 Therapy Communication Note    Patient Name: Melecio Whaley  MRN: [id number]  Today's Date: 12/9/2023     Discipline: Physical Therapy    Missed Visit Reason: Missed Visit Reason: Patient refused (Patient eating breakfast and declined therapy at this time.)    Missed Time: Attempt @ 9:38am    Comment:

## 2023-12-09 NOTE — NURSING NOTE
0720: Nurse-to-nurse bedside shift report received. Pt. Lying in semi-fowlers position resting and breathing even, unlabored breaths

## 2023-12-10 ENCOUNTER — APPOINTMENT (OUTPATIENT)
Dept: RADIOLOGY | Facility: HOSPITAL | Age: 88
DRG: 689 | End: 2023-12-10
Payer: MEDICARE

## 2023-12-10 LAB
ABO GROUP (TYPE) IN BLOOD: NORMAL
ANION GAP SERPL CALC-SCNC: 14 MMOL/L (ref 10–20)
ANTIBODY SCREEN: NORMAL
BACTERIA UR CULT: ABNORMAL
BUN SERPL-MCNC: 33 MG/DL (ref 6–23)
CALCIUM SERPL-MCNC: 8.3 MG/DL (ref 8.6–10.3)
CHLORIDE SERPL-SCNC: 105 MMOL/L (ref 98–107)
CO2 SERPL-SCNC: 27 MMOL/L (ref 21–32)
CREAT SERPL-MCNC: 1.92 MG/DL (ref 0.5–1.3)
ERYTHROCYTE [DISTWIDTH] IN BLOOD BY AUTOMATED COUNT: 15.7 % (ref 11.5–14.5)
GFR SERPL CREATININE-BSD FRML MDRD: 34 ML/MIN/1.73M*2
GLUCOSE SERPL-MCNC: 107 MG/DL (ref 74–99)
HCT VFR BLD AUTO: 39.2 % (ref 41–52)
HGB BLD-MCNC: 12.5 G/DL (ref 13.5–17.5)
INR PPP: 1.5 (ref 0.9–1.1)
MCH RBC QN AUTO: 29.9 PG (ref 26–34)
MCHC RBC AUTO-ENTMCNC: 31.9 G/DL (ref 32–36)
MCV RBC AUTO: 94 FL (ref 80–100)
NRBC BLD-RTO: 0 /100 WBCS (ref 0–0)
PLATELET # BLD AUTO: 219 X10*3/UL (ref 150–450)
POTASSIUM SERPL-SCNC: 3.9 MMOL/L (ref 3.5–5.3)
PROTHROMBIN TIME: 16.9 SECONDS (ref 9.8–12.8)
RBC # BLD AUTO: 4.18 X10*6/UL (ref 4.5–5.9)
RH FACTOR (ANTIGEN D): NORMAL
SODIUM SERPL-SCNC: 142 MMOL/L (ref 136–145)
WBC # BLD AUTO: 17.5 X10*3/UL (ref 4.4–11.3)

## 2023-12-10 PROCEDURE — 2500000001 HC RX 250 WO HCPCS SELF ADMINISTERED DRUGS (ALT 637 FOR MEDICARE OP): Performed by: NURSE PRACTITIONER

## 2023-12-10 PROCEDURE — 2500000004 HC RX 250 GENERAL PHARMACY W/ HCPCS (ALT 636 FOR OP/ED): Performed by: PHYSICIAN ASSISTANT

## 2023-12-10 PROCEDURE — 2500000004 HC RX 250 GENERAL PHARMACY W/ HCPCS (ALT 636 FOR OP/ED): Performed by: NURSE PRACTITIONER

## 2023-12-10 PROCEDURE — 71045 X-RAY EXAM CHEST 1 VIEW: CPT | Performed by: RADIOLOGY

## 2023-12-10 PROCEDURE — 85027 COMPLETE CBC AUTOMATED: CPT | Performed by: FAMILY MEDICINE

## 2023-12-10 PROCEDURE — 94760 N-INVAS EAR/PLS OXIMETRY 1: CPT

## 2023-12-10 PROCEDURE — 1200000002 HC GENERAL ROOM WITH TELEMETRY DAILY

## 2023-12-10 PROCEDURE — 97530 THERAPEUTIC ACTIVITIES: CPT | Mod: GO

## 2023-12-10 PROCEDURE — 71045 X-RAY EXAM CHEST 1 VIEW: CPT | Mod: FY

## 2023-12-10 PROCEDURE — 36415 COLL VENOUS BLD VENIPUNCTURE: CPT | Performed by: FAMILY MEDICINE

## 2023-12-10 PROCEDURE — 86900 BLOOD TYPING SEROLOGIC ABO: CPT | Performed by: NURSE PRACTITIONER

## 2023-12-10 PROCEDURE — 2500000002 HC RX 250 W HCPCS SELF ADMINISTERED DRUGS (ALT 637 FOR MEDICARE OP, ALT 636 FOR OP/ED): Performed by: NURSE PRACTITIONER

## 2023-12-10 PROCEDURE — 99232 SBSQ HOSP IP/OBS MODERATE 35: CPT | Performed by: NURSE PRACTITIONER

## 2023-12-10 PROCEDURE — 94640 AIRWAY INHALATION TREATMENT: CPT

## 2023-12-10 PROCEDURE — 2500000004 HC RX 250 GENERAL PHARMACY W/ HCPCS (ALT 636 FOR OP/ED): Performed by: INTERNAL MEDICINE

## 2023-12-10 PROCEDURE — 99233 SBSQ HOSP IP/OBS HIGH 50: CPT | Performed by: NURSE PRACTITIONER

## 2023-12-10 PROCEDURE — 85610 PROTHROMBIN TIME: CPT | Performed by: FAMILY MEDICINE

## 2023-12-10 PROCEDURE — 80048 BASIC METABOLIC PNL TOTAL CA: CPT | Performed by: FAMILY MEDICINE

## 2023-12-10 RX ORDER — FUROSEMIDE 40 MG/1
40 TABLET ORAL DAILY
Status: DISCONTINUED | OUTPATIENT
Start: 2023-12-11 | End: 2023-12-11

## 2023-12-10 RX ADMIN — IPRATROPIUM BROMIDE AND ALBUTEROL SULFATE 3 ML: 2.5; .5 SOLUTION RESPIRATORY (INHALATION) at 08:20

## 2023-12-10 RX ADMIN — BUPROPION HYDROCHLORIDE 150 MG: 150 TABLET, FILM COATED, EXTENDED RELEASE ORAL at 09:07

## 2023-12-10 RX ADMIN — Medication 3 MG: at 18:43

## 2023-12-10 RX ADMIN — ACETAMINOPHEN 975 MG: 325 TABLET ORAL at 09:06

## 2023-12-10 RX ADMIN — GABAPENTIN 100 MG: 100 CAPSULE ORAL at 21:11

## 2023-12-10 RX ADMIN — FUROSEMIDE 40 MG: 10 INJECTION, SOLUTION INTRAMUSCULAR; INTRAVENOUS at 02:14

## 2023-12-10 RX ADMIN — PIPERACILLIN SODIUM AND TAZOBACTAM SODIUM 2.25 G: 2; .25 INJECTION, SOLUTION INTRAVENOUS at 11:55

## 2023-12-10 RX ADMIN — IPRATROPIUM BROMIDE AND ALBUTEROL SULFATE 3 ML: 2.5; .5 SOLUTION RESPIRATORY (INHALATION) at 19:52

## 2023-12-10 RX ADMIN — ATORVASTATIN CALCIUM 40 MG: 40 TABLET, FILM COATED ORAL at 21:11

## 2023-12-10 RX ADMIN — GABAPENTIN 100 MG: 100 CAPSULE ORAL at 09:07

## 2023-12-10 RX ADMIN — ASPIRIN 81 MG: 81 TABLET, COATED ORAL at 09:07

## 2023-12-10 RX ADMIN — ACETAMINOPHEN 975 MG: 325 TABLET ORAL at 21:11

## 2023-12-10 RX ADMIN — PIPERACILLIN SODIUM AND TAZOBACTAM SODIUM 2.25 G: 2; .25 INJECTION, SOLUTION INTRAVENOUS at 23:28

## 2023-12-10 RX ADMIN — PIPERACILLIN SODIUM AND TAZOBACTAM SODIUM 2.25 G: 2; .25 INJECTION, SOLUTION INTRAVENOUS at 18:07

## 2023-12-10 RX ADMIN — IPRATROPIUM BROMIDE AND ALBUTEROL SULFATE 3 ML: 2.5; .5 SOLUTION RESPIRATORY (INHALATION) at 15:01

## 2023-12-10 RX ADMIN — POLYETHYLENE GLYCOL 3350 17 G: 17 POWDER, FOR SOLUTION ORAL at 09:06

## 2023-12-10 RX ADMIN — CITALOPRAM HYDROBROMIDE 40 MG: 20 TABLET ORAL at 09:06

## 2023-12-10 ASSESSMENT — COGNITIVE AND FUNCTIONAL STATUS - GENERAL
DAILY ACTIVITIY SCORE: 16
HELP NEEDED FOR BATHING: A LOT
MOVING FROM LYING ON BACK TO SITTING ON SIDE OF FLAT BED WITH BEDRAILS: A LOT
WALKING IN HOSPITAL ROOM: A LOT
DRESSING REGULAR LOWER BODY CLOTHING: A LOT
TURNING FROM BACK TO SIDE WHILE IN FLAT BAD: A LOT
CLIMB 3 TO 5 STEPS WITH RAILING: TOTAL
TOILETING: A LOT
DRESSING REGULAR LOWER BODY CLOTHING: A LOT
DRESSING REGULAR UPPER BODY CLOTHING: A LITTLE
DRESSING REGULAR UPPER BODY CLOTHING: A LITTLE
PERSONAL GROOMING: A LITTLE
PERSONAL GROOMING: A LITTLE
MOVING TO AND FROM BED TO CHAIR: A LOT
MOBILITY SCORE: 11
HELP NEEDED FOR BATHING: A LOT
TOILETING: A LOT
DAILY ACTIVITIY SCORE: 16
STANDING UP FROM CHAIR USING ARMS: A LOT

## 2023-12-10 ASSESSMENT — PAIN SCALES - GENERAL
PAINLEVEL_OUTOF10: 0 - NO PAIN
PAINLEVEL_OUTOF10: 0 - NO PAIN

## 2023-12-10 ASSESSMENT — PAIN SCALES - WONG BAKER
WONGBAKER_NUMERICALRESPONSE: NO HURT
WONGBAKER_NUMERICALRESPONSE: NO HURT

## 2023-12-10 ASSESSMENT — PAIN - FUNCTIONAL ASSESSMENT: PAIN_FUNCTIONAL_ASSESSMENT: 0-10

## 2023-12-10 NOTE — PROGRESS NOTES
Occupational Therapy    Occupational Therapy Treatment    Name: Melecio Whaley  MRN: [id number]  : 1935  Date: 12/10/23  Time Calculation  Start Time: 1513  Stop Time: 1536  Time Calculation (min): 23 min    Assessment:  OT Assessment: Pt demo's improvement with balance but continues to present with decreased sitting/standing balance, decreased cognition, endurance, and strength. Pt to benefit from skilled OT services to increase independence with ADL's, transfers, and mobility.  Prognosis: Good  Barriers to Discharge: None  Evaluation/Treatment Tolerance: Patient limited by fatigue  Medical Staff Made Aware: Yes  End of Session Communication: Bedside nurse  End of Session Patient Position: Bed, 3 rail up  Plan:  Treatment Interventions: ADL retraining, Functional transfer training, UE strengthening/ROM, Endurance training, Cognitive reorientation  OT Frequency: 3 times per week  OT Discharge Recommendations: Moderate intensity level of continued care  Equipment Recommended upon Discharge: Wheeled walker  OT Recommended Transfer Status: Assist of 2  OT - OK to Discharge: Yes (per OT POC)    Subjective   Previous Visit Info:  OT Last Visit  OT Received On: 12/10/23  General:  General  Reason for Referral: Pt is a 86 y/o male  presenting with multiple complaints including new waxing and waning slurred speech with a very dry mouth, urinary incontinence at baseline with malodorous urine, increased dyspnea with wheezing and leg edema, and recent fall out of bed a day ago  Referred By: DONALD Linn  Past Medical History Relevant to Rehab: Past medical history of diastolic CHF (congestive heart failure) (CMS/HCC), Depression, Heart disease, and atrial fib and on coumadin, neuropathy, NDE and refuses CPAP, anxiety/depression, CAD, HPLD, dyspnea, cognitive impairment, HTN.  Prior to Session Communication: Bedside nurse  Patient Position Received: Bed, 3 rail up  General Comment: pt pleasent and agreeable to therapy,  mildly confused upon arrival.  Precautions:  Hearing/Visual Limitations: [facility]  Medical Precautions: Fall precautions    Objective   Functional Standing Tolerance:  Functional Standing Tolerance  Time: 15 seconds  Activity: Standing for functional mobility/endurance activity  Functional Standing Tolerance Comments: Unable to tolerate extended stand for ADL  Bed Mobility/Transfers: Bed Mobility  Bed Mobility: Yes  Bed Mobility 1  Bed Mobility 1: Supine to sitting, Sitting to supine  Level of Assistance 1: Maximum assistance  Bed Mobility Comments 1: Assist at UE to hoist, assist to swing BLE over EOB. Increased time needed  Bed Mobility 2  Bed Mobility  2: Scooting  Level of Assistance 2: Moderate assistance (x2)  Bed Mobility Comments 2: to scoot, pt able to assist in transfer using handrails    Transfers  Transfer: Yes  Transfer 1  Transfer From 1: Sit to  Transfer to 1: Stand  Technique 1: Sit to stand  Transfer Device 1: Walker  Transfer Level of Assistance 1: Contact guard  Trials/Comments 1: to stand EOB, demo'd very limited standing tolerance. increased time, rest breaks needed    Sitting Balance:  Static Sitting Balance  Static Sitting-Balance Support: Bilateral upper extremity supported, Feet supported  Static Sitting-Level of Assistance: Contact guard  Static Sitting-Comment/Number of Minutes: Sat EOB during rest breaks, slouched posture. demo'd improvement [facility] balance    Outcome Measures:  [facility] Daily Activity  Putting on and taking off regular lower body clothing: A lot  Bathing (including washing, rinsing, drying): A lot  Putting on and taking off regular upper body clothing: A little  Toileting, which includes using toilet, bedpan or urinal: A lot  Taking care of personal grooming such as brushing teeth: A little  Eating Meals: None  Daily Activity - Total Score: 16        Education Documentation  Body Mechanics, taught by Harish Eason OT at 12/10/2023  3:46 PM.  Learner: Patient  Readiness:  Acceptance  Method: Explanation  Response: Verbalizes Understanding    Precautions, taught by Harish Eason OT at 12/10/2023  3:46 PM.  Learner: Patient  Readiness: Acceptance  Method: Explanation  Response: Verbalizes Understanding    ADL Training, taught by Harish Eason OT at 12/10/2023  3:46 PM.  Learner: Patient  Readiness: Acceptance  Method: Explanation  Response: Verbalizes Understanding    Education Comments  No comments found.      Goals:  Encounter Problems       Encounter Problems (Active)       Balance       Pt will demo GOOD sitting balance and FAIR+ static/dynamic standing balance during ADL and functional activity with FWW >2 minutes with UE support for improved independence and participation in ADL  (Progressing)       Start:  12/08/23    Expected End:  12/22/23               Dressing Upper Extremities       Patient will dress upper body independently  (Progressing)       Start:  12/08/23    Expected End:  12/22/23               Dressings Lower Extremities       Patient to complete lower body dressing with AE as needed at min A  (Progressing)       Start:  12/08/23    Expected End:  12/22/23               Endurance       Pt will increase endurance to tolerate 10 min of activity with no more than 1 rest break in order to increase ability to engage in ADL completion.  (Progressing)       Start:  12/08/23    Expected End:  12/22/23               Mobility       Pt will demo increased functional mobility to tolerate tasks necessary to complete ADL routine with FWW at min A x 1 (Progressing)       Start:  12/08/23    Expected End:  12/22/23               Toileting       Patient will complete toileting tasks with FWW at min A x 1 (Progressing)       Start:  12/08/23    Expected End:  12/22/23               Transfers       Pt to demonstrate bed mobility at CGA (Progressing)       Start:  12/08/23    Expected End:  12/22/23            Pt to demonstrate transfers with FWW at min A x1 (Progressing)        Start:  12/08/23    Expected End:  12/22/23

## 2023-12-10 NOTE — CONSULTS
"Reason For Consult  hematuia    History Of Present Illness  Melecio Whaley is a 87 y.o. male presenting with slurred speech and incontinence. He had retention and a catheter was placed last night with no significant output.  Hematuria developed a a 3way Craft was placed,  Repeat bladder scan this AM identified a full bladder and he has DENA.     Past Medical History  He has a past medical history of CHF (congestive heart failure) (CMS/[facility]), Depression, Heart disease, and Personal history of other diseases of the nervous system and sense organs.    Surgical History  He has a past surgical history that includes Other surgical history (06/16/2021); Other surgical history (06/16/2021); Other surgical history (06/16/2021); Other surgical history (06/16/2021); and Other surgical history (06/16/2021).     Social History  He reports that he has never smoked. He has never used smokeless tobacco. He reports that he does not drink alcohol and does not use drugs.    Family History  Family History   Problem Relation Name Age of Onset    Coronary artery disease Neg Hx      Stroke Neg Hx          Allergies  Latex    Review of Systems  Unable to provide a history     Physical Exam  No distress  Normal resp effort  Abdomen soft , NT, ND  3way with bloody drainage, catheter would not flush    Catheter removed and new 18F Craft placed over a wire.  Clear urine drained from bladder     Last Recorded Vitals  Blood pressure (!) 165/97, pulse 91, temperature 36 °C (96.8 °F), temperature source Temporal, resp. rate 20, height 1.829 m (6' 0.01\"), weight 123 kg (270 lb 8.1 oz), SpO2 100 %.    Relevant Results      Scheduled medications  acetaminophen, 975 mg, oral, BID  aspirin, 81 mg, oral, Daily  atorvastatin, 40 mg, oral, Nightly  buPROPion XL, 150 mg, oral, Daily  citalopram, 40 mg, oral, Daily  [START ON 12/11/2023] furosemide, 40 mg, oral, Daily  gabapentin, 100 mg, oral, TID  ipratropium-albuteroL, 3 mL, nebulization, TID  melatonin, " 3 mg, oral, Daily  [Held by provider] oxybutynin, 5 mg, oral, TID  perflutren protein A microsphere, 0.5 mL, intravenous, Once in imaging  piperacillin-tazobactam, 2.25 g, intravenous, q6h  polyethylene glycol, 17 g, oral, Daily  sulfur hexafluoride microsphr, 2 mL, intravenous, Once in imaging  [Held by provider] warfarin, 5 mg, oral, Daily      Continuous medications  [Held by provider] sodium chloride 0.9%, 100 mL/hr, Last Rate: 100 mL/hr (12/10/23 0035)      PRN medications  PRN medications: acetaminophen, albuterol, zinc oxide  Results for orders placed or performed during the hospital encounter of 12/07/23 (from the past 24 hour(s))   Type and screen   Result Value Ref Range    ABO TYPE A     Rh TYPE POS     ANTIBODY SCREEN NEG    CBC   Result Value Ref Range    WBC 17.5 (H) 4.4 - 11.3 x10*3/uL    nRBC 0.0 0.0 - 0.0 /100 WBCs    RBC 4.18 (L) 4.50 - 5.90 x10*6/uL    Hemoglobin 12.5 (L) 13.5 - 17.5 g/dL    Hematocrit 39.2 (L) 41.0 - 52.0 %    MCV 94 80 - 100 fL    MCH 29.9 26.0 - 34.0 pg    MCHC 31.9 (L) 32.0 - 36.0 g/dL    RDW 15.7 (H) 11.5 - 14.5 %    Platelets 219 150 - 450 x10*3/uL   Basic Metabolic Panel   Result Value Ref Range    Glucose 107 (H) 74 - 99 mg/dL    Sodium 142 136 - 145 mmol/L    Potassium 3.9 3.5 - 5.3 mmol/L    Chloride 105 98 - 107 mmol/L    Bicarbonate 27 21 - 32 mmol/L    Anion Gap 14 10 - 20 mmol/L    Urea Nitrogen 33 (H) 6 - 23 mg/dL    Creatinine 1.92 (H) 0.50 - 1.30 mg/dL    eGFR 34 (L) >60 mL/min/1.73m*2    Calcium 8.3 (L) 8.6 - 10.3 mg/dL   Protime-INR   Result Value Ref Range    Protime 16.9 (H) 9.8 - 12.8 seconds    INR 1.5 (H) 0.9 - 1.1        Assessment/Plan     87 year old has urinary retention and DENA.  He had gross hematuria following catheter placement.  His catheter required replacement into the bladder and is now draining clear.  Voiding trial once condition improves, but would leave the catheter in place for at least 3 days.    Rogerio Bautista MD

## 2023-12-10 NOTE — CONSULTS
Consults  Referred by CARINA Linn MD: Nevaeh Taylor MD    Reason For Consult  UTI / not improving    History Of Present Illness  Melecio Whaley is a 87 y.o. male, hx of HTN, hx of CAD, hx of CHF, hx of AF, he was admitted for altered mentations, his ua showed pyuria, the culture with g-ve bacilli, he has been on Rocephin, he needed a Craft for retention, he developed hematuria, his WBC are trending up, he is unable to provide a hx, no fever, no emesis, no diarrhea.     Past Medical History  He has a past medical history of CHF (congestive heart failure) (CMS/[facility]), Depression, Heart disease, and Personal history of other diseases of the nervous system and sense organs.    Surgical History  He has a past surgical history that includes Other surgical history (06/16/2021); Other surgical history (06/16/2021); Other surgical history (06/16/2021); Other surgical history (06/16/2021); and Other surgical history (06/16/2021).     Social History     Occupational History    Not on file   Tobacco Use    Smoking status: Never    Smokeless tobacco: Never   Vaping Use    Vaping Use: Never used    Passive vaping exposure: Yes   Substance and Sexual Activity    Alcohol use: Never    Drug use: Never    Sexual activity: Not on file     Travel History   Travel since 11/10/23    No documented travel since 11/10/23       Family History  Family History   Problem Relation Name Age of Onset    Coronary artery disease Neg Hx      Stroke Neg Hx       Allergies  Latex     Immunization History   Administered Date(s) Administered    Flu vaccine, quadrivalent, high-dose, preservative free, age 65y+ (FLUZONE) 09/11/2020, 10/04/2021, 11/10/2022    Influenza, High Dose Seasonal, Preservative Free 10/25/2019    Influenza, Unspecified 09/22/2011, 10/11/2012, 11/22/2013, 10/03/2014, 10/19/2015, 09/29/2016, 10/30/2017, 08/29/2018    Moderna COVID-19 vaccine, bivalent, blue cap/gray label *Check age/dose* 11/10/2022    Pfizer Gray Cap  SARS-CoV-2 04/20/2022    Pfizer Purple Cap SARS-CoV-2 01/30/2021, 02/20/2021, 10/08/2021    Pneumococcal conjugate vaccine, 13-valent (PREVNAR 13) 02/15/2018    Td vaccine, age 7 years and older (TDVAX) 02/10/2011    Zoster vaccine, recombinant, adult (SHINGRIX) 11/12/2009   Pneumonia and influenza vaccines are up to date  Tobar fall risk 100, preventive protocol was implemented  Depression screen could not be done secondary to his mentation    Medications  Home medications:  Medications Prior to Admission   Medication Sig Dispense Refill Last Dose    buPROPion XL (Wellbutrin XL) 150 mg 24 hr tablet TAKE 1 TABLET BY MOUTH EVERY DAY (Patient not taking: Reported on 12/8/2023) 90 tablet 3 Not Taking    citalopram (CeleXA) 40 mg tablet Take 1 tablet (40 mg) by mouth once daily. 90 tablet 3 12/7/2023    cyanocobalamin (Vitamin B-12) 500 mcg tablet Take 2 tablets (1,000 mcg) by mouth once daily.   12/7/2023    dilTIAZem XR (Dilacor XR) 180 mg 24 hr capsule Take 1 capsule (180 mg) by mouth once daily.   12/7/2023    dilTIAZem XR (Dilacor XR) 240 mg 24 hr capsule Take 1 capsule (240 mg) by mouth once daily.   12/7/2023    dofetilide (Tikosyn) 500 mcg capsule Take 1 capsule (500 mcg) by mouth once daily.   12/7/2023    furosemide (Lasix) 40 mg tablet Take 1 tablet (40 mg) by mouth. Every Monday, Wednesday & Friday 12/7/2023    gabapentin (Neurontin) 300 mg capsule Take 1 capsule (300 mg) by mouth 2 times a day. 180 capsule 3 12/7/2023    oxybutynin (Ditropan) 5 mg tablet Take 2 tablets (10 mg) by mouth 2 times a day.   12/7/2023    simvastatin (Zocor) 20 mg tablet TAKE 1 TABLET BY MOUTH EVERY DAY 90 tablet 3 12/7/2023    warfarin (Coumadin) 2.5 mg tablet 1 daily Monday and Friday 24 tablet 3 12/7/2023     Current medications:  Scheduled medications  acetaminophen, 975 mg, oral, BID  aspirin, 81 mg, oral, Daily  atorvastatin, 40 mg, oral, Nightly  buPROPion XL, 150 mg, oral, Daily  cefTRIAXone, 1 g, intravenous,  q24h  citalopram, 40 mg, oral, Daily  [Held by provider] furosemide, 40 mg, intravenous, q12h  gabapentin, 100 mg, oral, TID  ipratropium-albuteroL, 3 mL, nebulization, TID  melatonin, 3 mg, oral, Daily  [Held by provider] oxybutynin, 5 mg, oral, TID  perflutren protein A microsphere, 0.5 mL, intravenous, Once in imaging  polyethylene glycol, 17 g, oral, Daily  sulfur hexafluoride microsphr, 2 mL, intravenous, Once in imaging  [Held by provider] warfarin, 5 mg, oral, Daily      Continuous medications  [Held by provider] sodium chloride 0.9%, 100 mL/hr, Last Rate: 100 mL/hr (12/10/23 0035)      PRN medications  PRN medications: acetaminophen, albuterol, zinc oxide    Review of Systems   Unable to perform ROS: Mental status change        Objective  Range of Vitals (last 24 hours)  Heart Rate:  [67-88]   Temp:  [36 °C (96.8 °F)-36.2 °C (97.2 °F)]   Resp:  [18-20]   BP: (120-166)/(78-92)   SpO2:  [93 %-99 %]   Daily Weight  12/09/23 : 123 kg (270 lb 8.1 oz)    Body mass index is 36.68 kg/m².   Nutritional consult    Physical Exam  Constitutional:       Appearance: Normal appearance.      Comments: Lethargic, confused   HENT:      Head: Normocephalic and atraumatic.      Mouth/Throat:      Mouth: Mucous membranes are moist.      Pharynx: Oropharynx is clear.   Eyes:      Pupils: Pupils are equal, round, and reactive to light.   Cardiovascular:      Rate and Rhythm: Normal rate. Rhythm irregular.      Heart sounds: Normal heart sounds.   Pulmonary:      Effort: Pulmonary effort is normal.      Breath sounds: Normal breath sounds.   Abdominal:      General: Abdomen is flat. Bowel sounds are normal.      Palpations: Abdomen is soft.   Musculoskeletal:         General: Swelling present.      Cervical back: Normal range of motion.          Relevant Results  Outside Hospital Results  reviewed  Labs  Results from last 72 hours   Lab Units 12/10/23  0627 12/09/23  0609 12/08/23  0630 12/07/23 2001   WBC AUTO x10*3/uL 17.5*  "12.1* 14.4* 11.2   HEMOGLOBIN g/dL 12.5* 13.2* 13.3* 13.8   HEMATOCRIT % 39.2* 42.1 42.0 43.6   PLATELETS AUTO x10*3/uL 219 238 236 253   NEUTROS PCT AUTO %  --   --   --  82.5   LYMPHS PCT AUTO %  --   --   --  7.4   MONOS PCT AUTO %  --   --   --  6.8   EOS PCT AUTO %  --   --   --  2.4     Results from last 72 hours   Lab Units 12/10/23  0627 12/09/23  0609 12/08/23  0630   SODIUM mmol/L 142 143 141   POTASSIUM mmol/L 3.9 3.9 4.2   CHLORIDE mmol/L 105 103 105   CO2 mmol/L 27 30 28   BUN mg/dL 33* 32* 36*   CREATININE mg/dL 1.92* 1.38* 1.32*   GLUCOSE mg/dL 107* 94 94   CALCIUM mg/dL 8.3* 8.7 8.6   ANION GAP mmol/L 14 14 12   EGFR mL/min/1.73m*2 34* 50* 53*     Results from last 72 hours   Lab Units 12/07/23 2001   ALK PHOS U/L 48   BILIRUBIN TOTAL mg/dL 0.5   PROTEIN TOTAL g/dL 6.7   ALT U/L 12   AST U/L 19   ALBUMIN g/dL 3.5     Estimated Creatinine Clearance: 38.1 mL/min (A) (by C-G formula based on SCr of 1.92 mg/dL (H)).  No results found for: \"CRP\", \"SEDRATE\"  No results found for: \"HIV1X2\", \"HIVCONF\", \"FQTZOO4EX\"  No results found for: \"HEPCABINIT\", \"HEPCAB\", \"HCVPCRQUANT\"  Microbiology  Susceptibility data from last 90 days.  Collected Specimen Info Organism   12/07/23 Urine from Clean Catch/Voided Gram Negative Bacilli     Imaging  Reviewed       Assessment/Plan   UTI  Encephalopathy  Leukocytosis    Recommendations :  Zosyn 2.25 gm every 6 hours  US kidney / bladder  Repeat the BC  Follow the WBC    I spent minutes in the professional and overall care of this patient.      Renzo Orozco MD  "

## 2023-12-10 NOTE — PROGRESS NOTES
Subjective Data:  Swelling has improved. Difficulty hearing . Denies chest pain or shortness of breath     Overnight Events:    No acute issues over nithg      Objective Data:  Last Recorded Vitals:  Vitals:    12/09/23 1433 12/09/23 2142 12/10/23 0631 12/10/23 0855   BP: 151/78 (!) 166/92 120/84 (!) 165/97   BP Location: Left arm Left arm Left arm Left arm   Patient Position: Lying Lying Lying Lying   Pulse: 67 84 88 91   Resp: 20 20 18 20   Temp: 36.2 °C (97.2 °F) 36.1 °C (97 °F) 36 °C (96.8 °F) 36 °C (96.8 °F)   TempSrc: Temporal Temporal Temporal Temporal   SpO2: 99% 93% 93% 100%   Weight:       Height:           Last Labs:  CBC - 12/10/2023:  6:27 AM  17.5 12.5 219    39.2      CMP - 12/10/2023:  6:27 AM  8.3 6.7 19 --- 0.5   _ 3.5 12 48      PTT - No results in last year.  1.5   16.9 _     BNP   Date/Time Value Ref Range Status   12/08/2023 06:30  0 - 99 pg/mL Final   11/09/2023 01:34  0 - 99 pg/mL Final     HGBA1C   Date/Time Value Ref Range Status   08/11/2022 11:25 AM 5.3 % Final     Comment:          Diagnosis of Diabetes-Adults   Non-Diabetic: < or = 5.6%   Increased risk for developing diabetes: 5.7-6.4%   Diagnostic of diabetes: > or = 6.5%  .       Monitoring of Diabetes                Age (y)     Therapeutic Goal (%)   Adults:          >18           <7.0   Pediatrics:    13-18           <7.5                   7-12           <8.0                   0- 6            7.5-8.5   American Diabetes Association. Diabetes Care 33(S1), Jan 2010.     07/23/2020 02:58 PM 5.5 4.3 - 5.6 % Final     Comment:     American Diabetes Association guidelines indicate that patients with HgbA1c in   the range 5.7-6.4% are at increased risk for development of diabetes, and   intervention by lifestyle modification may be beneficial. HgbA1c greater or   equal to 6.5% is considered diagnostic of diabetes.     LDLCALC   Date/Time Value Ref Range Status   12/08/2023 06:30 AM 55 <=99 mg/dL Final     Comment:                                  Near   Borderline      AGE      Desirable  Optimal    High     High     Very High     0-19 Y     0 - 109     ---    110-129   >/= 130     ----    20-24 Y     0 - 119     ---    120-159   >/= 160     ----      >24 Y     0 -  99   100-129  130-159   160-189     >/=190       VLDL   Date/Time Value Ref Range Status   12/08/2023 06:30 AM 13 0 - 40 mg/dL Final   05/04/2023 10:04 AM 14 0 - 40 mg/dL Final   08/11/2022 11:25 AM 16 0 - 40 mg/dL Final   06/11/2021 09:45 AM 16 0 - 40 mg/dL Final      Last I/O:  I/O last 3 completed shifts:  In: 120 (1 mL/kg) [P.O.:120]  Out: 1250 (10.2 mL/kg) [Urine:1250 (0.3 mL/kg/hr)]  Weight: 122.7 kg     Past Cardiology Tests (Last 3 Years):  EKG:  No results found for this or any previous visit from the past 1095 days.    Echo:  Transthoracic Echo (TTE) Complete 12/08/2023  1. Left ventricular systolic function is normal.   2. Moderately increased left ventricular septal thickness.   3. Moderate aortic valve stenosis.   4. Mildly elevated RVSP.    Ejection Fractions:  EF   Date/Time Value Ref Range Status   12/08/2023 11:10 AM 60       Cath:  No results found for this or any previous visit from the past 1095 days.    Stress Test:  No results found for this or any previous visit from the past 1095 days.    Cardiac Imaging:  No results found for this or any previous visit from the past 1095 days.      Inpatient Medications:  Scheduled medications   Medication Dose Route Frequency    acetaminophen  975 mg oral BID    aspirin  81 mg oral Daily    atorvastatin  40 mg oral Nightly    buPROPion XL  150 mg oral Daily    citalopram  40 mg oral Daily    [Held by provider] furosemide  40 mg intravenous q12h    gabapentin  100 mg oral TID    ipratropium-albuteroL  3 mL nebulization TID    melatonin  3 mg oral Daily    [Held by provider] oxybutynin  5 mg oral TID    perflutren protein A microsphere  0.5 mL intravenous Once in imaging    piperacillin-tazobactam  2.25 g intravenous q6h     polyethylene glycol  17 g oral Daily    sulfur hexafluoride microsphr  2 mL intravenous Once in imaging    [Held by provider] warfarin  5 mg oral Daily     PRN medications   Medication    acetaminophen    albuterol    zinc oxide     Continuous Medications   Medication Dose Last Rate    [Held by provider] sodium chloride 0.9%  100 mL/hr 100 mL/hr (12/10/23 0035)       Physical Exam:  Physical Exam  Vitals reviewed.   HENT:      Head: Normocephalic.      Nose: Nose normal.   Eyes:      Pupils: Pupils are equal, round, and reactive to light.   Cardiovascular:      Rate and Rhythm: Normal rate and regular rhythm.   Pulmonary:      Effort: Pulmonary effort is normal.      Breath sounds: Normal breath sounds.   Abdominal:      General: Abdomen is flat.      Palpations: Abdomen is soft.   Musculoskeletal:         General: Normal range of motion.      Cervical back: Normal range of motion.   Skin:     General: Skin is warm and dry.   Neurological:      General: No focal deficit present.      Mental Status: He is alert and oriented to person, place, and time.   Psychiatric:         Mood and Affect: Mood normal.       JVP has improved   Extremities trace to 1+ pedal edema bilaterally      Assessment/Plan   Melecio Whaley is a 86 yo male with a PMH of Afib/flutter, Chronic diastolic heart failure (HFpEF 55-60%), DLD, Depression, mild cognitive impairment who presented with AMS, Lower extremity edema, shortness of breath. Patients home medications showing Cardizem both 180 and 240mg daily, Tikosyn 500mg daily, Warfarin M and Friday. Heart rate is well controlled, volume overloaded.      #Acute on chronic decompensated diastolic heart failure (HFpEF 55-60%)  #Atrial flutter with slow response     -Transition to Lasix 40 mg po daily   -Strict I&O, daily weight  -c/w home atorvastatin  -Monitor HR can resume if necessary but has been bradycardic  -Resume Warfarin INR goal 2.0-3.0  -Will follow.   Peripheral IV 12/07/23 20 G  Left;Dorsal Hand (Active)   Site Assessment Dry;Intact;Clean 12/09/23 2315   Dressing Status Clean;Dry 12/09/23 2315   Number of days: 3       Urethral Catheter Other (Comment) 22 Fr. (Active)   Urethral Catheter Output (mL) 750 mL 12/10/23 0901   Number of days: 1       Code Status:  Full Code    I spent  minutes in the professional and overall care of this patient.        Sonja Ruelas, APRN-CNP

## 2023-12-10 NOTE — PROGRESS NOTES
Melecio Whaley is a 87 y.o. male on day 3 of admission presenting with DENA (acute kidney injury) (CMS/[facility]).      Subjective    The patient is awake and alert this morning. He communicates better when wearing his hearing aids. He complains of abdominal discomfort this morning. Last evening, he had hematuria and a 3 way catheter was placed.      Objective     Last Recorded Vitals  BP (!) 165/97 (BP Location: Left arm, Patient Position: Lying)   Pulse 91   Temp 36 °C (96.8 °F) (Temporal)   Resp 20   Wt 123 kg (270 lb 8.1 oz)   SpO2 100% Comment: checked during breathing treatment  Intake/Output last 3 Shifts:    Intake/Output Summary (Last 24 hours) at 12/10/2023 1139  Last data filed at 12/10/2023 0035  Gross per 24 hour   Intake 0 ml   Output --   Net 0 ml         Admission Weight  Weight: 130 kg (287 lb 7.7 oz) (12/07/23 1935)    Daily Weight  12/09/23 : 123 kg (270 lb 8.1 oz)    Image Results  XR chest 1 view  Narrative: Interpreted By:  Alhaji Flaherty,   STUDY:  XR CHEST 1 VIEW;  12/10/2023 9:04 am      INDICATION:  Signs/Symptoms:Wheezing.      COMPARISON:  12/08/2023.      ACCESSION NUMBER(S):  [id number]      ORDERING CLINICIAN:  DALE VALDES      FINDINGS:  CARDIOMEDIASTINAL SILHOUETTE:  Cardiomegaly and aortic prominence is similar to prior.      LUNGS:  Mild elevation of the right hemidiaphragm is now seen. There is mild  irregular interstitial prominence. Right basilar linear atelectasis  or scarring is present. Small pleural effusions not excluded. No  pneumothorax is seen.      ABDOMEN:  No remarkable upper abdominal findings.      BONES:  Bones are stable.      Impression: 1.  Mild irregular interstitial thickening may be chronic or  represent interstitial edema.  2. Right basilar linear atelectasis/scarring.              MACRO:  None.      Signed by: Alhaji Flaherty 12/10/2023 9:10 AM  Dictation workstation:   [id number]      Physical Exam  Constitutional:       Appearance: He is obese.    HENT:      Nose: Nose normal.      Mouth/Throat:      Mouth: Mucous membranes are dry.   Eyes:      Extraocular Movements: Extraocular movements intact.   Neck:      Vascular: No JVD.   Cardiovascular:      Rate and Rhythm: Rhythm irregular.      Heart sounds: No murmur heard.  Pulmonary:      Effort: No respiratory distress.      Breath sounds: Transmitted upper airway sounds present.   Abdominal:      General: There is distension.   Genitourinary:     Comments: Hematuria (gross)  Musculoskeletal:      Right lower le+ Pitting Edema present.      Left lower le+ Pitting Edema present.   Skin:     Comments: Sacral pressure ulcer (see pictures)   Neurological:      General: No focal deficit present.      Mental Status: He is alert and oriented to person, place, and time. Mental status is at baseline.      Motor: Weakness present.      Comments: Dysarthria likely due to poor dentition and dry oral mucosa. Generalized weakness. Very [place].       Results for orders placed or performed during the hospital encounter of 23 (from the past 24 hour(s))   Type and screen   Result Value Ref Range    ABO TYPE A     Rh TYPE POS     ANTIBODY SCREEN NEG    CBC   Result Value Ref Range    WBC 17.5 (H) 4.4 - 11.3 x10*3/uL    nRBC 0.0 0.0 - 0.0 /100 WBCs    RBC 4.18 (L) 4.50 - 5.90 x10*6/uL    Hemoglobin 12.5 (L) 13.5 - 17.5 g/dL    Hematocrit 39.2 (L) 41.0 - 52.0 %    MCV 94 80 - 100 fL    MCH 29.9 26.0 - 34.0 pg    MCHC 31.9 (L) 32.0 - 36.0 g/dL    RDW 15.7 (H) 11.5 - 14.5 %    Platelets 219 150 - 450 x10*3/uL   Basic Metabolic Panel   Result Value Ref Range    Glucose 107 (H) 74 - 99 mg/dL    Sodium 142 136 - 145 mmol/L    Potassium 3.9 3.5 - 5.3 mmol/L    Chloride 105 98 - 107 mmol/L    Bicarbonate 27 21 - 32 mmol/L    Anion Gap 14 10 - 20 mmol/L    Urea Nitrogen 33 (H) 6 - 23 mg/dL    Creatinine 1.92 (H) 0.50 - 1.30 mg/dL    eGFR 34 (L) >60 mL/min/1.73m*2    Calcium 8.3 (L) 8.6 - 10.3 mg/dL   Protime-INR   Result  Value Ref Range    Protime 16.9 (H) 9.8 - 12.8 seconds    INR 1.5 (H) 0.9 - 1.1       Relevant Results             No current facility-administered medications on file prior to encounter.     Current Outpatient Medications on File Prior to Encounter   Medication Sig Dispense Refill    buPROPion XL (Wellbutrin XL) 150 mg 24 hr tablet TAKE 1 TABLET BY MOUTH EVERY DAY (Patient not taking: Reported on 12/8/2023) 90 tablet 3    citalopram (CeleXA) 40 mg tablet Take 1 tablet (40 mg) by mouth once daily. 90 tablet 3    cyanocobalamin (Vitamin B-12) 500 mcg tablet Take 2 tablets (1,000 mcg) by mouth once daily.      dilTIAZem XR (Dilacor XR) 180 mg 24 hr capsule Take 1 capsule (180 mg) by mouth once daily.      dilTIAZem XR (Dilacor XR) 240 mg 24 hr capsule Take 1 capsule (240 mg) by mouth once daily.      dofetilide (Tikosyn) 500 mcg capsule Take 1 capsule (500 mcg) by mouth once daily.      furosemide (Lasix) 40 mg tablet Take 1 tablet (40 mg) by mouth. Every Monday, Wednesday & Friday      gabapentin (Neurontin) 300 mg capsule Take 1 capsule (300 mg) by mouth 2 times a day. 180 capsule 3    oxybutynin (Ditropan) 5 mg tablet Take 2 tablets (10 mg) by mouth 2 times a day.      simvastatin (Zocor) 20 mg tablet TAKE 1 TABLET BY MOUTH EVERY DAY 90 tablet 3    warfarin (Coumadin) 2.5 mg tablet 1 daily Monday and Friday 24 tablet 3    [DISCONTINUED] acetaminophen (Tylenol) 500 mg tablet Take 2 tablets (1,000 mg) by mouth 2 times a day.      [DISCONTINUED] ALPRAZolam (Xanax) 1 mg tablet Take 1 tablet (1 mg) by mouth 3 times a day as needed.      [DISCONTINUED] buPROPion SR (Wellbutrin SR) 150 mg 12 hr tablet Take 1 tablet (150 mg) by mouth once daily. Do not crush, chew, or split.      [DISCONTINUED] cholecalciferol (Vitamin D-3) 50 mcg (2,000 unit) capsule Take 1 capsule (50 mcg) by mouth early in the morning..      [DISCONTINUED] dilTIAZem LA (Cardizem LA) 240 mg 24 hr tablet Take 1 tablet (240 mg) by mouth once daily.       [DISCONTINUED] Matzim  mg 24 hr tablet Take 1 tablet (180 mg) by mouth once daily.        Scheduled medications  acetaminophen, 975 mg, oral, BID  aspirin, 81 mg, oral, Daily  atorvastatin, 40 mg, oral, Nightly  buPROPion XL, 150 mg, oral, Daily  citalopram, 40 mg, oral, Daily  [START ON 12/11/2023] furosemide, 40 mg, oral, Daily  gabapentin, 100 mg, oral, TID  ipratropium-albuteroL, 3 mL, nebulization, TID  melatonin, 3 mg, oral, Daily  [Held by provider] oxybutynin, 5 mg, oral, TID  perflutren protein A microsphere, 0.5 mL, intravenous, Once in imaging  piperacillin-tazobactam, 2.25 g, intravenous, q6h  polyethylene glycol, 17 g, oral, Daily  sulfur hexafluoride microsphr, 2 mL, intravenous, Once in imaging  [Held by provider] warfarin, 5 mg, oral, Daily      Continuous medications  [Held by provider] sodium chloride 0.9%, 100 mL/hr, Last Rate: 100 mL/hr (12/10/23 0035)      PRN medications  PRN medications: acetaminophen, albuterol, zinc oxide     Assessment/Plan                  Principal Problem:    DENA (acute kidney injury) (CMS/[facility])  Active Problems:    Altered mental status, unspecified altered mental status type      Acute encephalopathy   -Likely due to infectious process with UTI and dehydration  -MRI brain and MRA head and neck results pending (cancelled patient unable to lie flat)  -Neurology adds that stroke is unlikely recommends PT  -Lipid panel  -ASA and statin added   -Monitor for increase of symptoms; son says he is at baseline    Gross hematuria  Urine retention  UTI, UC gram negative bacilli  -Possibly due to trauma  -3 way urethral catheter inserted for retention and hematuria noted  -Irrigate until clear, if not insert 3 way daniel  -Type and screen  -H&H stable  -Held ditropan and warfarin  -ID recommends switching the ceftriaxone to Zosyn  -Intake and output strict  -Consider urology if not better    Diastolic HF exacerbation  Atrial fibrillation   -Pitting edema noted  -Cardiology  recommends holding Cardizem and Tikosyn for now.  Furosemide held due to DENA. Will resume as soon as possible.  -Telemetry monitoring   -Echocardiogram shows hypervolemia  -Holding warfarin  -Strict intake and output    DENA  -Worsened this morning. BUN/Creatinine 33/1.92 at this time.   -Holding furosemide this morning  -Monitor renal function  -Urology consulted  -Consider nephrology consult if worsens       Mechanical fall   -No injury two days ago trauma has signed off   -Will resume coumadin     Sacral wound  -Does not appear infected  -Wound care      DVT prophylaxis  -Continue coumadin        Dispo: PT will re-evaluate the patient once he is stable to determine where he goes after discharge.     Erin Bennett, APRN-CNP

## 2023-12-10 NOTE — CARE PLAN
The patient's goals for the shift include comfort    The clinical goals for the shift include pt will have clear output by end of shift    Over the shift, the patient did make progress toward the following goals. Barriers to progression include acuteness of illness. Recommendations to address these barriers include administration of prescribed medications and treatment, as well as tracking intake and output.

## 2023-12-11 LAB
ANION GAP SERPL CALC-SCNC: 12 MMOL/L (ref 10–20)
BNP SERPL-MCNC: 339 PG/ML (ref 0–99)
BUN SERPL-MCNC: 32 MG/DL (ref 6–23)
CALCIUM SERPL-MCNC: 8.1 MG/DL (ref 8.6–10.3)
CHLORIDE SERPL-SCNC: 105 MMOL/L (ref 98–107)
CO2 SERPL-SCNC: 29 MMOL/L (ref 21–32)
CREAT SERPL-MCNC: 1.44 MG/DL (ref 0.5–1.3)
ERYTHROCYTE [DISTWIDTH] IN BLOOD BY AUTOMATED COUNT: 15.7 % (ref 11.5–14.5)
GFR SERPL CREATININE-BSD FRML MDRD: 47 ML/MIN/1.73M*2
GLUCOSE SERPL-MCNC: 102 MG/DL (ref 74–99)
HCT VFR BLD AUTO: 37.1 % (ref 41–52)
HGB BLD-MCNC: 11.5 G/DL (ref 13.5–17.5)
INR PPP: 1.5 (ref 0.9–1.1)
MCH RBC QN AUTO: 29.2 PG (ref 26–34)
MCHC RBC AUTO-ENTMCNC: 31 G/DL (ref 32–36)
MCV RBC AUTO: 94 FL (ref 80–100)
NRBC BLD-RTO: 0 /100 WBCS (ref 0–0)
PLATELET # BLD AUTO: 194 X10*3/UL (ref 150–450)
POTASSIUM SERPL-SCNC: 3.5 MMOL/L (ref 3.5–5.3)
PROTHROMBIN TIME: 17.3 SECONDS (ref 9.8–12.8)
RBC # BLD AUTO: 3.94 X10*6/UL (ref 4.5–5.9)
SODIUM SERPL-SCNC: 142 MMOL/L (ref 136–145)
WBC # BLD AUTO: 13.1 X10*3/UL (ref 4.4–11.3)

## 2023-12-11 PROCEDURE — 1200000002 HC GENERAL ROOM WITH TELEMETRY DAILY

## 2023-12-11 PROCEDURE — 36415 COLL VENOUS BLD VENIPUNCTURE: CPT | Performed by: FAMILY MEDICINE

## 2023-12-11 PROCEDURE — 85027 COMPLETE CBC AUTOMATED: CPT | Performed by: FAMILY MEDICINE

## 2023-12-11 PROCEDURE — 2500000004 HC RX 250 GENERAL PHARMACY W/ HCPCS (ALT 636 FOR OP/ED): Performed by: NURSE PRACTITIONER

## 2023-12-11 PROCEDURE — 2500000001 HC RX 250 WO HCPCS SELF ADMINISTERED DRUGS (ALT 637 FOR MEDICARE OP): Performed by: NURSE PRACTITIONER

## 2023-12-11 PROCEDURE — 2500000001 HC RX 250 WO HCPCS SELF ADMINISTERED DRUGS (ALT 637 FOR MEDICARE OP)

## 2023-12-11 PROCEDURE — 99232 SBSQ HOSP IP/OBS MODERATE 35: CPT | Performed by: INTERNAL MEDICINE

## 2023-12-11 PROCEDURE — 94640 AIRWAY INHALATION TREATMENT: CPT

## 2023-12-11 PROCEDURE — 2500000004 HC RX 250 GENERAL PHARMACY W/ HCPCS (ALT 636 FOR OP/ED): Performed by: INTERNAL MEDICINE

## 2023-12-11 PROCEDURE — 2500000002 HC RX 250 W HCPCS SELF ADMINISTERED DRUGS (ALT 637 FOR MEDICARE OP, ALT 636 FOR OP/ED): Performed by: NURSE PRACTITIONER

## 2023-12-11 PROCEDURE — 87040 BLOOD CULTURE FOR BACTERIA: CPT | Mod: GEALAB | Performed by: INTERNAL MEDICINE

## 2023-12-11 PROCEDURE — 83880 ASSAY OF NATRIURETIC PEPTIDE: CPT

## 2023-12-11 PROCEDURE — 94760 N-INVAS EAR/PLS OXIMETRY 1: CPT

## 2023-12-11 PROCEDURE — 80048 BASIC METABOLIC PNL TOTAL CA: CPT | Performed by: FAMILY MEDICINE

## 2023-12-11 PROCEDURE — 99233 SBSQ HOSP IP/OBS HIGH 50: CPT | Performed by: NURSE PRACTITIONER

## 2023-12-11 PROCEDURE — 85610 PROTHROMBIN TIME: CPT | Performed by: FAMILY MEDICINE

## 2023-12-11 PROCEDURE — 36415 COLL VENOUS BLD VENIPUNCTURE: CPT | Performed by: INTERNAL MEDICINE

## 2023-12-11 RX ORDER — FUROSEMIDE 40 MG/1
40 TABLET ORAL 2 TIMES DAILY
Status: DISCONTINUED | OUTPATIENT
Start: 2023-12-11 | End: 2023-12-14 | Stop reason: HOSPADM

## 2023-12-11 RX ORDER — WARFARIN SODIUM 5 MG/1
2.5 TABLET ORAL DAILY
Status: DISCONTINUED | OUTPATIENT
Start: 2023-12-11 | End: 2023-12-12

## 2023-12-11 RX ADMIN — PIPERACILLIN SODIUM AND TAZOBACTAM SODIUM 2.25 G: 2; .25 INJECTION, SOLUTION INTRAVENOUS at 18:10

## 2023-12-11 RX ADMIN — WARFARIN SODIUM 2.5 MG: 5 TABLET ORAL at 18:10

## 2023-12-11 RX ADMIN — IPRATROPIUM BROMIDE AND ALBUTEROL SULFATE 3 ML: 2.5; .5 SOLUTION RESPIRATORY (INHALATION) at 20:02

## 2023-12-11 RX ADMIN — ATORVASTATIN CALCIUM 40 MG: 40 TABLET, FILM COATED ORAL at 20:59

## 2023-12-11 RX ADMIN — IPRATROPIUM BROMIDE AND ALBUTEROL SULFATE 3 ML: 2.5; .5 SOLUTION RESPIRATORY (INHALATION) at 13:44

## 2023-12-11 RX ADMIN — Medication 3 MG: at 20:59

## 2023-12-11 RX ADMIN — FUROSEMIDE 40 MG: 40 TABLET ORAL at 09:36

## 2023-12-11 RX ADMIN — PIPERACILLIN SODIUM AND TAZOBACTAM SODIUM 2.25 G: 2; .25 INJECTION, SOLUTION INTRAVENOUS at 12:31

## 2023-12-11 RX ADMIN — PIPERACILLIN SODIUM AND TAZOBACTAM SODIUM 2.25 G: 2; .25 INJECTION, SOLUTION INTRAVENOUS at 05:58

## 2023-12-11 RX ADMIN — ASPIRIN 81 MG: 81 TABLET, COATED ORAL at 09:35

## 2023-12-11 RX ADMIN — GABAPENTIN 100 MG: 100 CAPSULE ORAL at 09:36

## 2023-12-11 RX ADMIN — GABAPENTIN 100 MG: 100 CAPSULE ORAL at 20:59

## 2023-12-11 RX ADMIN — IPRATROPIUM BROMIDE AND ALBUTEROL SULFATE 3 ML: 2.5; .5 SOLUTION RESPIRATORY (INHALATION) at 08:56

## 2023-12-11 RX ADMIN — CITALOPRAM HYDROBROMIDE 40 MG: 20 TABLET ORAL at 09:50

## 2023-12-11 RX ADMIN — ACETAMINOPHEN 975 MG: 325 TABLET ORAL at 09:35

## 2023-12-11 RX ADMIN — ACETAMINOPHEN 975 MG: 325 TABLET ORAL at 20:58

## 2023-12-11 RX ADMIN — POLYETHYLENE GLYCOL 3350 17 G: 17 POWDER, FOR SOLUTION ORAL at 09:45

## 2023-12-11 RX ADMIN — GABAPENTIN 100 MG: 100 CAPSULE ORAL at 15:13

## 2023-12-11 RX ADMIN — FUROSEMIDE 40 MG: 40 TABLET ORAL at 20:59

## 2023-12-11 RX ADMIN — BUPROPION HYDROCHLORIDE 150 MG: 150 TABLET, FILM COATED, EXTENDED RELEASE ORAL at 09:50

## 2023-12-11 RX ADMIN — FUROSEMIDE 40 MG: 40 TABLET ORAL at 15:15

## 2023-12-11 ASSESSMENT — COGNITIVE AND FUNCTIONAL STATUS - GENERAL
MOBILITY SCORE: 11
DRESSING REGULAR LOWER BODY CLOTHING: A LOT
DRESSING REGULAR UPPER BODY CLOTHING: A LITTLE
MOVING FROM LYING ON BACK TO SITTING ON SIDE OF FLAT BED WITH BEDRAILS: A LOT
TOILETING: A LOT
CLIMB 3 TO 5 STEPS WITH RAILING: TOTAL
HELP NEEDED FOR BATHING: A LOT
PERSONAL GROOMING: A LITTLE
TOILETING: A LOT
DAILY ACTIVITIY SCORE: 16
DAILY ACTIVITIY SCORE: 16
WALKING IN HOSPITAL ROOM: A LOT
MOVING TO AND FROM BED TO CHAIR: A LOT
CLIMB 3 TO 5 STEPS WITH RAILING: TOTAL
DRESSING REGULAR LOWER BODY CLOTHING: A LOT
TURNING FROM BACK TO SIDE WHILE IN FLAT BAD: A LOT
STANDING UP FROM CHAIR USING ARMS: A LOT
TURNING FROM BACK TO SIDE WHILE IN FLAT BAD: A LOT
HELP NEEDED FOR BATHING: A LOT
MOVING FROM LYING ON BACK TO SITTING ON SIDE OF FLAT BED WITH BEDRAILS: A LOT
PERSONAL GROOMING: A LITTLE
MOBILITY SCORE: 11
WALKING IN HOSPITAL ROOM: A LOT
STANDING UP FROM CHAIR USING ARMS: A LOT
DRESSING REGULAR UPPER BODY CLOTHING: A LITTLE
MOVING TO AND FROM BED TO CHAIR: A LOT

## 2023-12-11 ASSESSMENT — PAIN SCALES - GENERAL: PAINLEVEL_OUTOF10: 0 - NO PAIN

## 2023-12-11 NOTE — PROGRESS NOTES
12/11/23 1548   Discharge Planning   Living Arrangements Spouse/significant other;Other (Comment)  ([facility] with spouse)   Support Systems Spouse/significant other   Assistance Needed patient is alert and oriented x3, Craig, son reports sometimes needs assistance for ADL;s, has a rollator and a wheelchair   Type of Residence Assisted living   Care Facility Name [facility]   Who is requesting discharge planning? Provider   Home or Post Acute Services In home services   Type of Post Acute Facility Services Assisted living   Type of Home Care Services Home OT;Home PT         12/11/2023 1454: Attempted to speak to son at bedside regarding PT/OT recommendations. Son no longer at bedside and no contact info listed in the chart. Will follow up with [facility] in the morning to discuss patient therapy needs and reach out to son.

## 2023-12-11 NOTE — PROGRESS NOTES
Melecio Whaley is a 88 y.o. male on day 4 of admission presenting with DENA (acute kidney injury) (CMS/[facility]).      Subjective    The patient is sitting up in bed talking with his son. He is alert and oriented X3. Today, [birth year] his birthday and he is looking forward to returning to assisted living with his wife. He denies any pain or discomfort.      Objective     Last Recorded Vitals  /81 (BP Location: Right arm, Patient Position: Lying)   Pulse 67   Temp 36.1 °C (97 °F) (Temporal)   Resp 20   Wt 123 kg (270 lb 8.1 oz)   SpO2 96%   Intake/Output last 3 Shifts:    Intake/Output Summary (Last 24 hours) at 12/11/2023 1419  Last data filed at 12/11/2023 1330  Gross per 24 hour   Intake 821.67 ml   Output 1150 ml   Net -328.33 ml         Admission Weight  Weight: 130 kg (287 lb 7.7 oz) (12/07/23 1935)    Daily Weight  12/09/23 : 123 kg (270 lb 8.1 oz)    Image Results  XR chest 1 view  Narrative: Interpreted By:  Alhaji Flaherty,   STUDY:  XR CHEST 1 VIEW;  12/10/2023 9:04 am      INDICATION:  Signs/Symptoms:Wheezing.      COMPARISON:  12/08/2023.      ACCESSION NUMBER(S):  [id number]      ORDERING CLINICIAN:  DALE VALDES      FINDINGS:  CARDIOMEDIASTINAL SILHOUETTE:  Cardiomegaly and aortic prominence is similar to prior.      LUNGS:  Mild elevation of the right hemidiaphragm is now seen. There is mild  irregular interstitial prominence. Right basilar linear atelectasis  or scarring is present. Small pleural effusions not excluded. No  pneumothorax is seen.      ABDOMEN:  No remarkable upper abdominal findings.      BONES:  Bones are stable.      Impression: 1.  Mild irregular interstitial thickening may be chronic or  represent interstitial edema.  2. Right basilar linear atelectasis/scarring.              MACRO:  None.      Signed by: Alhaji Flaherty 12/10/2023 9:10 AM  Dictation workstation:   [id number]      Physical Exam  Constitutional:       Appearance: He is obese.   HENT:      Nose: Nose normal.       Mouth/Throat:      Mouth: Mucous membranes are dry.   Eyes:      Extraocular Movements: Extraocular movements intact.   Neck:      Vascular: No JVD.   Cardiovascular:      Rate and Rhythm: Rhythm irregular.      Heart sounds: No murmur heard.  Pulmonary:      Effort: No respiratory distress.      Breath sounds: Transmitted upper airway sounds present.   Abdominal:      General: There is distension.   Genitourinary:     Comments: Hematuria (gross)  Musculoskeletal:      Right lower le+ Pitting Edema present.      Left lower le+ Pitting Edema present.   Skin:     Comments: Sacral pressure ulcer (see pictures)   Neurological:      General: No focal deficit present.      Mental Status: He is alert and oriented to person, place, and time. Mental status is at baseline.      Motor: Weakness present.      Comments: Dysarthria likely due to poor dentition and dry oral mucosa. Generalized weakness. Very [place].       Results for orders placed or performed during the hospital encounter of 23 (from the past 24 hour(s))   Basic Metabolic Panel   Result Value Ref Range    Glucose 102 (H) 74 - 99 mg/dL    Sodium 142 136 - 145 mmol/L    Potassium 3.5 3.5 - 5.3 mmol/L    Chloride 105 98 - 107 mmol/L    Bicarbonate 29 21 - 32 mmol/L    Anion Gap 12 10 - 20 mmol/L    Urea Nitrogen 32 (H) 6 - 23 mg/dL    Creatinine 1.44 (H) 0.50 - 1.30 mg/dL    eGFR 47 (L) >60 mL/min/1.73m*2    Calcium 8.1 (L) 8.6 - 10.3 mg/dL   Protime-INR   Result Value Ref Range    Protime 17.3 (H) 9.8 - 12.8 seconds    INR 1.5 (H) 0.9 - 1.1   CBC   Result Value Ref Range    WBC 13.1 (H) 4.4 - 11.3 x10*3/uL    nRBC 0.0 0.0 - 0.0 /100 WBCs    RBC 3.94 (L) 4.50 - 5.90 x10*6/uL    Hemoglobin 11.5 (L) 13.5 - 17.5 g/dL    Hematocrit 37.1 (L) 41.0 - 52.0 %    MCV 94 80 - 100 fL    MCH 29.2 26.0 - 34.0 pg    MCHC 31.0 (L) 32.0 - 36.0 g/dL    RDW 15.7 (H) 11.5 - 14.5 %    Platelets 194 150 - 450 x10*3/uL   B-type Natriuretic Peptide   Result Value Ref Range      (H) 0 - 99 pg/mL       Relevant Results             No current facility-administered medications on file prior to encounter.     Current Outpatient Medications on File Prior to Encounter   Medication Sig Dispense Refill    buPROPion XL (Wellbutrin XL) 150 mg 24 hr tablet TAKE 1 TABLET BY MOUTH EVERY DAY (Patient not taking: Reported on 12/8/2023) 90 tablet 3    citalopram (CeleXA) 40 mg tablet Take 1 tablet (40 mg) by mouth once daily. 90 tablet 3    cyanocobalamin (Vitamin B-12) 500 mcg tablet Take 2 tablets (1,000 mcg) by mouth once daily.      dilTIAZem XR (Dilacor XR) 180 mg 24 hr capsule Take 1 capsule (180 mg) by mouth once daily.      dilTIAZem XR (Dilacor XR) 240 mg 24 hr capsule Take 1 capsule (240 mg) by mouth once daily.      dofetilide (Tikosyn) 500 mcg capsule Take 1 capsule (500 mcg) by mouth once daily.      furosemide (Lasix) 40 mg tablet Take 1 tablet (40 mg) by mouth. Every Monday, Wednesday & Friday      gabapentin (Neurontin) 300 mg capsule Take 1 capsule (300 mg) by mouth 2 times a day. 180 capsule 3    oxybutynin (Ditropan) 5 mg tablet Take 2 tablets (10 mg) by mouth 2 times a day.      simvastatin (Zocor) 20 mg tablet TAKE 1 TABLET BY MOUTH EVERY DAY 90 tablet 3    warfarin (Coumadin) 2.5 mg tablet 1 daily Monday and Friday 24 tablet 3    [DISCONTINUED] acetaminophen (Tylenol) 500 mg tablet Take 2 tablets (1,000 mg) by mouth 2 times a day.      [DISCONTINUED] ALPRAZolam (Xanax) 1 mg tablet Take 1 tablet (1 mg) by mouth 3 times a day as needed.      [DISCONTINUED] buPROPion SR (Wellbutrin SR) 150 mg 12 hr tablet Take 1 tablet (150 mg) by mouth once daily. Do not crush, chew, or split.      [DISCONTINUED] cholecalciferol (Vitamin D-3) 50 mcg (2,000 unit) capsule Take 1 capsule (50 mcg) by mouth early in the morning..      [DISCONTINUED] dilTIAZem LA (Cardizem LA) 240 mg 24 hr tablet Take 1 tablet (240 mg) by mouth once daily.      [DISCONTINUED] Matzim  mg 24 hr tablet Take 1  tablet (180 mg) by mouth once daily.        Scheduled medications  acetaminophen, 975 mg, oral, BID  aspirin, 81 mg, oral, Daily  atorvastatin, 40 mg, oral, Nightly  buPROPion XL, 150 mg, oral, Daily  citalopram, 40 mg, oral, Daily  furosemide, 40 mg, oral, BID  gabapentin, 100 mg, oral, TID  ipratropium-albuteroL, 3 mL, nebulization, TID  melatonin, 3 mg, oral, Daily  [Held by provider] oxybutynin, 5 mg, oral, TID  perflutren protein A microsphere, 0.5 mL, intravenous, Once in imaging  piperacillin-tazobactam, 2.25 g, intravenous, q6h  polyethylene glycol, 17 g, oral, Daily  sulfur hexafluoride microsphr, 2 mL, intravenous, Once in imaging  warfarin, 2.5 mg, oral, Daily  [Held by provider] warfarin, 5 mg, oral, Daily      Continuous medications  [Held by provider] sodium chloride 0.9%, 100 mL/hr, Last Rate: 100 mL/hr (12/10/23 5875)      PRN medications  PRN medications: acetaminophen, albuterol, zinc oxide     Assessment/Plan                  Principal Problem:    DENA (acute kidney injury) (CMS/[facility])  Active Problems:    Altered mental status, unspecified altered mental status type      #Acute encephalopathy   -Likely due to infectious process with UTI and dehydration  -MRI brain and MRA head and neck results pending (cancelled patient unable to lie flat)  -Neurology adds that stroke is unlikely recommends PT  -Lipid panel  -ASA and statin added   -Monitor for increase of symptoms; son says he is at baseline    #Gross hematuria  #Urine retention  #Complicated UTI, UC morganella morganii  -3 way urethral catheter inserted for retention. The hematuria persisted and urology was consulted. Urology removed and replaced the 3 way with the standard catheter. The hematuria resolved and the coumadin was resumed.   -ID, switched the patient to Zosyn when the leukocytosis worsened and the patient's mentation improved  -Holding ditropan  -Type and screened  -H&H stable  -Urology recommends a void trial on 12/12/23    Diastolic  HF exacerbation  Atrial fibrillation   -Pitting edema noted  -Cardiology recommends holding Cardizem and Tikosyn for now.    -Resume furosemide   -Telemetry monitoring   -Echocardiogram shows hypervolemia  -Resume coumadin tonight  -INR 1.5  -Strict intake and output    DENA  -Worsened this morning. BUN/Creatinine 33/1.92 at this time.   -Holding furosemide this morning  -Monitor renal function  -Urology consulted  -Consider nephrology consult if worsens       Mechanical fall   -No injury two days ago trauma has signed off   -Will resume coumadin     Sacral wound  -Does not appear infected  -Wound care      DVT prophylaxis  -Continue coumadin        Dispo: The patient should be medically ready to go tomorrow 12/12/23, after the void trial. PT/OT recommends moderate intensity therapy, but the family would prefer he return to the assisted living facility with his wife. TCC to speak with the family about their options.     Erin Bennett, APRN-CNP

## 2023-12-11 NOTE — PROGRESS NOTES
Subjective Data:  States feeling well this morning, but still having intermittent shortness of breath. Asking a lot for questions this morning as to why he is here, states he knows he has been confused and forgetful.        Objective Data:  Last Recorded Vitals:  Vitals:    12/10/23 1400 12/10/23 1501 12/10/23 2100 12/11/23 0500   BP: 121/70  135/61 148/81   BP Location: Left arm  Right arm Right arm   Patient Position: Lying  Lying Lying   Pulse: 83  84 67   Resp: 20      Temp: 36.2 °C (97.2 °F)  35.9 °C (96.6 °F) 36.1 °C (97 °F)   TempSrc: Temporal  Temporal Temporal   SpO2: 93% 93% 95% 98%   Weight:       Height:           Last Labs:  CBC - 12/11/2023:  6:25 AM  13.1 11.5 194    37.1      CMP - 12/11/2023:  6:24 AM  8.1 6.7 19 --- 0.5   _ 3.5 12 48      PTT - No results in last year.  1.5   17.3 _     BNP   Date/Time Value Ref Range Status   12/08/2023 06:30  0 - 99 pg/mL Final   11/09/2023 01:34  0 - 99 pg/mL Final     HGBA1C   Date/Time Value Ref Range Status   08/11/2022 11:25 AM 5.3 % Final     Comment:          Diagnosis of Diabetes-Adults   Non-Diabetic: < or = 5.6%   Increased risk for developing diabetes: 5.7-6.4%   Diagnostic of diabetes: > or = 6.5%  .       Monitoring of Diabetes                Age (y)     Therapeutic Goal (%)   Adults:          >18           <7.0   Pediatrics:    13-18           <7.5                   7-12           <8.0                   0- 6            7.5-8.5   American Diabetes Association. Diabetes Care 33(S1), Jan 2010.     07/23/2020 02:58 PM 5.5 4.3 - 5.6 % Final     Comment:     American Diabetes Association guidelines indicate that patients with HgbA1c in   the range 5.7-6.4% are at increased risk for development of diabetes, and   intervention by lifestyle modification may be beneficial. HgbA1c greater or   equal to 6.5% is considered diagnostic of diabetes.     LDLCALC   Date/Time Value Ref Range Status   12/08/2023 06:30 AM 55 <=99 mg/dL Final     Comment:                                  Near   Borderline      AGE      Desirable  Optimal    High     High     Very High     0-19 Y     0 - 109     ---    110-129   >/= 130     ----    20-24 Y     0 - 119     ---    120-159   >/= 160     ----      >24 Y     0 -  99   100-129  130-159   160-189     >/=190       VLDL   Date/Time Value Ref Range Status   12/08/2023 06:30 AM 13 0 - 40 mg/dL Final   05/04/2023 10:04 AM 14 0 - 40 mg/dL Final   08/11/2022 11:25 AM 16 0 - 40 mg/dL Final   06/11/2021 09:45 AM 16 0 - 40 mg/dL Final      Last I/O:  I/O last 3 completed shifts:  In: 391.7 (3.2 mL/kg) [P.O.:240; I.V.:1.7 (0 mL/kg); IV Piggyback:150]  Out: 1150 (9.4 mL/kg) [Urine:1150 (0.3 mL/kg/hr)]  Weight: 122.7 kg       Inpatient Medications:  Scheduled medications   Medication Dose Route Frequency    acetaminophen  975 mg oral BID    aspirin  81 mg oral Daily    atorvastatin  40 mg oral Nightly    buPROPion XL  150 mg oral Daily    citalopram  40 mg oral Daily    furosemide  40 mg oral Daily    gabapentin  100 mg oral TID    ipratropium-albuteroL  3 mL nebulization TID    melatonin  3 mg oral Daily    [Held by provider] oxybutynin  5 mg oral TID    perflutren protein A microsphere  0.5 mL intravenous Once in imaging    piperacillin-tazobactam  2.25 g intravenous q6h    polyethylene glycol  17 g oral Daily    sulfur hexafluoride microsphr  2 mL intravenous Once in imaging    warfarin  2.5 mg oral Daily    [Held by provider] warfarin  5 mg oral Daily     PRN medications   Medication    acetaminophen    albuterol    zinc oxide     Continuous Medications   Medication Dose Last Rate    [Held by provider] sodium chloride 0.9%  100 mL/hr 100 mL/hr (12/10/23 1625)       Physical Exam  Vitals reviewed.   HENT:      Head: Normocephalic.      Nose: Nose normal.   Eyes:      Pupils: Pupils are equal, round, and reactive to light.   Cardiovascular:      Rate and Rhythm: irregular, +JVD, BLE edema   Pulmonary:      Effort: Pulmonary effort is  normal.      Breath sounds: Normal breath sounds.   Abdominal:      General: Abdomen is distended       Palpations: Abdomen is soft.   Musculoskeletal:         General: Normal range of motion.      Cervical back: Normal range of motion.   Skin:     General: Skin is warm and dry.   Neurological:      General: No focal deficit present.      Mental Status: He is alert and oriented to person, place, and time.   Psychiatric:         Mood and Affect: Mood normal.         Behavior: Behavior normal.        Assessment/Plan   Melecio Whaley is a 86 yo male with a PMH of Afib/flutter, Chronic diastolic heart failure (HFpEF 55-60%), DLD, Depression, mild cognitive impairment who presented with AMS, lower extremity edema, shortness of breath. Patients home medications showed Cardizem both 180 and 240mg daily, Tikosyn 500mg daily, both of these were discontinued considering he was in aflutter, had a slow ventricular response and AMS. Yesterday his Lasix was transitioned from 40 IV BID to 40mg PO once daily but was held d/t DENA, 1.9 -->1.44 today. Coumadin was being held d/t hematuria. Still appears hypervolemic on exam, BLE pitting edema and +JVD. BNP 12/8 261, 339 today.    Echocardiogram 12/8/23  1. Left ventricular systolic function is normal.  2. Moderately increased left ventricular septal thickness.  3. Moderate aortic valve stenosis.  4. Mildly elevated RVSP.    #Acute on chronic decompensated diastolic heart failure (HFpEF 55-60%)  #Atrial flutter with slow response    Plan:  -AV oni blocking agent is not necessary at this time  -Recommend PO Lasix 40 mg BID  -Strict I&O, daily weight   -Continue 40mg Atorvastatin daily  -Resume Warfarin, INR goal 2.0-3.0   -Follow up with EP  -Follow up with Cardiology     Cardiology singing off       VENUS Mitchell-CNP    Patient seen, discussed and examined with ACNP Aleah, above is representative of my medical decision making.    Lester Jose MD

## 2023-12-11 NOTE — PROGRESS NOTES
Melecio Whaley is a 88 y.o. male on day 4 of admission presenting with DENA (acute kidney injury) (CMS/HCC).    Subjective   Interval History: no fever, no new complaints         Review of Systems    Objective   Range of Vitals (last 24 hours)  Heart Rate:  [67-84]   Temp:  [35.9 °C (96.6 °F)-36.2 °C (97.2 °F)]   Resp:  [20]   BP: (121-148)/(61-81)   SpO2:  [93 %-98 %]   Daily Weight  12/09/23 : 123 kg (270 lb 8.1 oz)    Body mass index is 36.68 kg/m².    Physical Exam  Constitutional:       Appearance: Normal appearance.   HENT:      Head: Normocephalic and atraumatic.      Mouth/Throat:      Mouth: Mucous membranes are moist.      Pharynx: Oropharynx is clear.   Eyes:      Pupils: Pupils are equal, round, and reactive to light.   Cardiovascular:      Rate and Rhythm: Normal rate and regular rhythm.      Heart sounds: Normal heart sounds.   Pulmonary:      Effort: Pulmonary effort is normal.      Breath sounds: Normal breath sounds.   Abdominal:      General: Abdomen is flat. Bowel sounds are normal.      Palpations: Abdomen is soft.   Musculoskeletal:      Cervical back: Normal range of motion.   Neurological:      Mental Status: He is alert.         Antibiotics  sodium chloride 0.9 % bolus 500 mL  buPROPion (Wellbutrin SR) 12 hr tablet  dilTIAZem (Dilacor XR) 24 hr capsule  dilTIAZem (Dilacor XR) 24 hr capsule  sodium chloride 0.9% infusion  enoxaparin (Lovenox) syringe 40 mg  sodium chloride 0.9% infusion  acetaminophen (Tylenol) tablet 650 mg  melatonin tablet 3 mg  polyethylene glycol (Glycolax, Miralax) packet 17 g  ondansetron (Zofran) tablet 4 mg  ondansetron (Zofran) injection 4 mg  citalopram (CeleXA) tablet 40 mg  gabapentin (Neurontin) capsule 300 mg  simvastatin (Zocor) tablet 20 mg  warfarin (Coumadin) tablet 2.5 mg  acetaminophen (Tylenol) tablet 975 mg  cefTRIAXone (Rocephin) IVPB 1 g  warfarin (Coumadin) tablet 5 mg  dofetilide (Tikosyn) capsule 250 mcg  perflutren lipid microspheres (Definity)  "injection 0.5-10 mL of dilution  sulfur hexafluoride microsphr (Lumason) injection 24.28 mg  perflutren protein A microsphere (Optison) injection 0.5 mL  buPROPion SR (Wellbutrin SR) 12 hr tablet 150 mg  ALPRAZolam (Xanax) tablet 1 mg  dilTIAZem XR (Dilacor XR) 24 hr capsule 240 mg  furosemide (Lasix) tablet 40 mg  oxybutynin (Ditropan) tablet 5 mg  gabapentin (Neurontin) capsule 100 mg  buPROPion XL (Wellbutrin XL) 24 hr tablet 150 mg  atorvastatin (Lipitor) tablet 40 mg  aspirin EC tablet 81 mg  zinc oxide 40 % ointment  furosemide (Lasix) injection 40 mg  ipratropium-albuteroL (Duo-Neb) 0.5-2.5 mg/3 mL nebulizer solution 3 mL  ipratropium-albuteroL (Duo-Neb) 0.5-2.5 mg/3 mL nebulizer solution 3 mL  albuterol 2.5 mg /3 mL (0.083 %) nebulizer solution 2.5 mg  cefTRIAXone (Rocephin) IVPB 1 g  piperacillin-tazobactam-dextrose (Zosyn) IV 2.25 g  furosemide (Lasix) tablet 40 mg  warfarin (Coumadin) tablet 2.5 mg      Relevant Results  Labs  Results from last 72 hours   Lab Units 12/11/23  0625 12/10/23  0627 12/09/23  0609   WBC AUTO x10*3/uL 13.1* 17.5* 12.1*   HEMOGLOBIN g/dL 11.5* 12.5* 13.2*   HEMATOCRIT % 37.1* 39.2* 42.1   PLATELETS AUTO x10*3/uL 194 219 238     Results from last 72 hours   Lab Units 12/11/23  0624 12/10/23  0627 12/09/23  0609   SODIUM mmol/L 142 142 143   POTASSIUM mmol/L 3.5 3.9 3.9   CHLORIDE mmol/L 105 105 103   CO2 mmol/L 29 27 30   BUN mg/dL 32* 33* 32*   CREATININE mg/dL 1.44* 1.92* 1.38*   GLUCOSE mg/dL 102* 107* 94   CALCIUM mg/dL 8.1* 8.3* 8.7   ANION GAP mmol/L 12 14 14   EGFR mL/min/1.73m*2 47* 34* 50*         Estimated Creatinine Clearance: 49.9 mL/min (A) (by C-G formula based on SCr of 1.44 mg/dL (H)).  No results found for: \"CRP\"  Microbiology  Susceptibility data from last 14 days.  Collected Specimen Info Organism Amoxicillin/Clavulanate Ampicillin Ampicillin/Sulbactam Cefazolin Ceftriaxone Ciprofloxacin Gentamicin Imipenem Piperacillin/Tazobactam Trimethoprim/Sulfamethoxazole "   12/07/23 Urine from Clean Catch/Voided Morganella morganii R R I R S S S I S S     Imaging  reviewed        Assessment/Plan   UTI, the urine with Morganella   Encephalopathy, likely metabolic  Leukocytosis, trending down     Recommendations :  Continue Zosyn  Incentive spirometry  Discussed with the medical team     I spent minutes in the professional and overall care of this patient.      Renzo Orozco MD

## 2023-12-11 NOTE — PROGRESS NOTES
Physical Therapy                 Therapy Communication Note    Patient Name: Melecio Whaley  MRN: [id number]  Today's Date: 12/11/2023     Discipline: Physical Therapy    Missed Visit Reason: Missed Visit Reason: Patient refused (Patient adomently declined. It is his birthday and he wanted to eat his chocolate. Patient stated he would work with therapy tomorrow. Educated patient on the benefits of participating in therapy. Patient verbally knows.)    Missed Time: Attempt @ 4303    Comment:

## 2023-12-11 NOTE — PROGRESS NOTES
Melecio Whaley is a 88 y.o. male on day 4 of admission presenting with DENA (acute kidney injury) (CMS/HCC).    Subjective   Interval History:  Doing well.  Tolerating diet.  No complaints.              Objective   Range of Vitals (last 24 hours)  Heart Rate:  [67-91]   Temp:  [35.9 °C (96.6 °F)-36.2 °C (97.2 °F)]   Resp:  [20]   BP: (121-165)/(61-97)   SpO2:  [93 %-100 %]   Daily Weight  12/09/23 : 123 kg (270 lb 8.1 oz)    Body mass index is 36.68 kg/m².    Physical Exam  Awake, alert  Breathing comfortably, respirations unlabored  Abdomen soft, ND, NT  Craft - urine clear      Antibiotics  sodium chloride 0.9 % bolus 500 mL  buPROPion (Wellbutrin SR) 12 hr tablet  dilTIAZem (Dilacor XR) 24 hr capsule  dilTIAZem (Dilacor XR) 24 hr capsule  sodium chloride 0.9% infusion  enoxaparin (Lovenox) syringe 40 mg  sodium chloride 0.9% infusion  acetaminophen (Tylenol) tablet 650 mg  melatonin tablet 3 mg  polyethylene glycol (Glycolax, Miralax) packet 17 g  ondansetron (Zofran) tablet 4 mg  ondansetron (Zofran) injection 4 mg  citalopram (CeleXA) tablet 40 mg  gabapentin (Neurontin) capsule 300 mg  simvastatin (Zocor) tablet 20 mg  warfarin (Coumadin) tablet 2.5 mg  acetaminophen (Tylenol) tablet 975 mg  cefTRIAXone (Rocephin) IVPB 1 g  warfarin (Coumadin) tablet 5 mg  dofetilide (Tikosyn) capsule 250 mcg  perflutren lipid microspheres (Definity) injection 0.5-10 mL of dilution  sulfur hexafluoride microsphr (Lumason) injection 24.28 mg  perflutren protein A microsphere (Optison) injection 0.5 mL  buPROPion SR (Wellbutrin SR) 12 hr tablet 150 mg  ALPRAZolam (Xanax) tablet 1 mg  dilTIAZem XR (Dilacor XR) 24 hr capsule 240 mg  furosemide (Lasix) tablet 40 mg  oxybutynin (Ditropan) tablet 5 mg  gabapentin (Neurontin) capsule 100 mg  buPROPion XL (Wellbutrin XL) 24 hr tablet 150 mg  atorvastatin (Lipitor) tablet 40 mg  aspirin EC tablet 81 mg  zinc oxide 40 % ointment  furosemide (Lasix) injection 40 mg  ipratropium-albuteroL  "(Duo-Neb) 0.5-2.5 mg/3 mL nebulizer solution 3 mL  ipratropium-albuteroL (Duo-Neb) 0.5-2.5 mg/3 mL nebulizer solution 3 mL  albuterol 2.5 mg /3 mL (0.083 %) nebulizer solution 2.5 mg  cefTRIAXone (Rocephin) IVPB 1 g  piperacillin-tazobactam-dextrose (Zosyn) IV 2.25 g  furosemide (Lasix) tablet 40 mg      Relevant Results  Labs  Results from last 72 hours   Lab Units 12/11/23  0625 12/10/23  0627 12/09/23  0609   WBC AUTO x10*3/uL 13.1* 17.5* 12.1*   HEMOGLOBIN g/dL 11.5* 12.5* 13.2*   HEMATOCRIT % 37.1* 39.2* 42.1   PLATELETS AUTO x10*3/uL 194 219 238     Results from last 72 hours   Lab Units 12/11/23  0624 12/10/23  0627 12/09/23  0609   SODIUM mmol/L 142 142 143   POTASSIUM mmol/L 3.5 3.9 3.9   CHLORIDE mmol/L 105 105 103   CO2 mmol/L 29 27 30   BUN mg/dL 32* 33* 32*   CREATININE mg/dL 1.44* 1.92* 1.38*   GLUCOSE mg/dL 102* 107* 94   CALCIUM mg/dL 8.1* 8.3* 8.7   ANION GAP mmol/L 12 14 14   EGFR mL/min/1.73m*2 47* 34* 50*         Estimated Creatinine Clearance: 49.9 mL/min (A) (by C-G formula based on SCr of 1.44 mg/dL (H)).  No results found for: \"CRP\"  Microbiology  Susceptibility data from last 14 days.  Collected Specimen Info Organism Amoxicillin/Clavulanate Ampicillin Ampicillin/Sulbactam Cefazolin Ceftriaxone Ciprofloxacin Gentamicin Imipenem Piperacillin/Tazobactam Trimethoprim/Sulfamethoxazole   12/07/23 Urine from Clean Catch/Voided Morganella morganii R R I R S S S I S S       Imaging              Assessment/Plan   Complicated UTI, hematuria, retention.      Recommendations:  Continue antibiotics.    Voiding trial 1-2 days.         I spent 10 minutes in the professional and overall care of this patient.      Ashleigh Bustillo MD  "

## 2023-12-12 LAB
ANION GAP SERPL CALC-SCNC: 12 MMOL/L (ref 10–20)
BACTERIA BLD CULT: NORMAL
BACTERIA BLD CULT: NORMAL
BUN SERPL-MCNC: 27 MG/DL (ref 6–23)
CALCIUM SERPL-MCNC: 7.9 MG/DL (ref 8.6–10.3)
CHLORIDE SERPL-SCNC: 100 MMOL/L (ref 98–107)
CO2 SERPL-SCNC: 34 MMOL/L (ref 21–32)
CREAT SERPL-MCNC: 1.49 MG/DL (ref 0.5–1.3)
ERYTHROCYTE [DISTWIDTH] IN BLOOD BY AUTOMATED COUNT: 15.6 % (ref 11.5–14.5)
GFR SERPL CREATININE-BSD FRML MDRD: 45 ML/MIN/1.73M*2
GLUCOSE SERPL-MCNC: 85 MG/DL (ref 74–99)
HCT VFR BLD AUTO: 35.6 % (ref 41–52)
HGB BLD-MCNC: 11.3 G/DL (ref 13.5–17.5)
INR PPP: 1.4 (ref 0.9–1.1)
MCH RBC QN AUTO: 29.7 PG (ref 26–34)
MCHC RBC AUTO-ENTMCNC: 31.7 G/DL (ref 32–36)
MCV RBC AUTO: 94 FL (ref 80–100)
NRBC BLD-RTO: 0 /100 WBCS (ref 0–0)
PLATELET # BLD AUTO: 200 X10*3/UL (ref 150–450)
POTASSIUM SERPL-SCNC: 3.5 MMOL/L (ref 3.5–5.3)
PROTHROMBIN TIME: 15.6 SECONDS (ref 9.8–12.8)
RBC # BLD AUTO: 3.8 X10*6/UL (ref 4.5–5.9)
SODIUM SERPL-SCNC: 142 MMOL/L (ref 136–145)
WBC # BLD AUTO: 10.8 X10*3/UL (ref 4.4–11.3)

## 2023-12-12 PROCEDURE — 94640 AIRWAY INHALATION TREATMENT: CPT

## 2023-12-12 PROCEDURE — 2500000004 HC RX 250 GENERAL PHARMACY W/ HCPCS (ALT 636 FOR OP/ED): Performed by: STUDENT IN AN ORGANIZED HEALTH CARE EDUCATION/TRAINING PROGRAM

## 2023-12-12 PROCEDURE — 85027 COMPLETE CBC AUTOMATED: CPT | Performed by: FAMILY MEDICINE

## 2023-12-12 PROCEDURE — 36415 COLL VENOUS BLD VENIPUNCTURE: CPT | Performed by: NURSE PRACTITIONER

## 2023-12-12 PROCEDURE — 2500000004 HC RX 250 GENERAL PHARMACY W/ HCPCS (ALT 636 FOR OP/ED): Performed by: INTERNAL MEDICINE

## 2023-12-12 PROCEDURE — 94760 N-INVAS EAR/PLS OXIMETRY 1: CPT

## 2023-12-12 PROCEDURE — 99232 SBSQ HOSP IP/OBS MODERATE 35: CPT | Performed by: STUDENT IN AN ORGANIZED HEALTH CARE EDUCATION/TRAINING PROGRAM

## 2023-12-12 PROCEDURE — 2500000004 HC RX 250 GENERAL PHARMACY W/ HCPCS (ALT 636 FOR OP/ED): Performed by: NURSE PRACTITIONER

## 2023-12-12 PROCEDURE — 80048 BASIC METABOLIC PNL TOTAL CA: CPT | Performed by: FAMILY MEDICINE

## 2023-12-12 PROCEDURE — 2500000002 HC RX 250 W HCPCS SELF ADMINISTERED DRUGS (ALT 637 FOR MEDICARE OP, ALT 636 FOR OP/ED): Performed by: NURSE PRACTITIONER

## 2023-12-12 PROCEDURE — 2500000001 HC RX 250 WO HCPCS SELF ADMINISTERED DRUGS (ALT 637 FOR MEDICARE OP)

## 2023-12-12 PROCEDURE — 85610 PROTHROMBIN TIME: CPT | Performed by: NURSE PRACTITIONER

## 2023-12-12 PROCEDURE — 36415 COLL VENOUS BLD VENIPUNCTURE: CPT | Performed by: FAMILY MEDICINE

## 2023-12-12 PROCEDURE — 1100000001 HC PRIVATE ROOM DAILY

## 2023-12-12 PROCEDURE — 2500000001 HC RX 250 WO HCPCS SELF ADMINISTERED DRUGS (ALT 637 FOR MEDICARE OP): Performed by: NURSE PRACTITIONER

## 2023-12-12 RX ORDER — WARFARIN SODIUM 5 MG/1
5 TABLET ORAL DAILY
Status: DISCONTINUED | OUTPATIENT
Start: 2023-12-12 | End: 2023-12-14 | Stop reason: HOSPADM

## 2023-12-12 RX ADMIN — GABAPENTIN 100 MG: 100 CAPSULE ORAL at 15:34

## 2023-12-12 RX ADMIN — ACETAMINOPHEN 650 MG: 325 TABLET ORAL at 11:06

## 2023-12-12 RX ADMIN — FUROSEMIDE 40 MG: 40 TABLET ORAL at 11:06

## 2023-12-12 RX ADMIN — GABAPENTIN 100 MG: 100 CAPSULE ORAL at 20:42

## 2023-12-12 RX ADMIN — WARFARIN SODIUM 5 MG: 5 TABLET ORAL at 18:33

## 2023-12-12 RX ADMIN — POLYETHYLENE GLYCOL 3350 17 G: 17 POWDER, FOR SOLUTION ORAL at 11:06

## 2023-12-12 RX ADMIN — FUROSEMIDE 40 MG: 40 TABLET ORAL at 20:42

## 2023-12-12 RX ADMIN — BUPROPION HYDROCHLORIDE 150 MG: 150 TABLET, FILM COATED, EXTENDED RELEASE ORAL at 11:06

## 2023-12-12 RX ADMIN — Medication 3 MG: at 18:33

## 2023-12-12 RX ADMIN — PIPERACILLIN SODIUM AND TAZOBACTAM SODIUM 2.25 G: 2; .25 INJECTION, SOLUTION INTRAVENOUS at 11:12

## 2023-12-12 RX ADMIN — PIPERACILLIN SODIUM AND TAZOBACTAM SODIUM 2.25 G: 2; .25 INJECTION, SOLUTION INTRAVENOUS at 06:00

## 2023-12-12 RX ADMIN — ASPIRIN 81 MG: 81 TABLET, COATED ORAL at 11:06

## 2023-12-12 RX ADMIN — IPRATROPIUM BROMIDE AND ALBUTEROL SULFATE 3 ML: 2.5; .5 SOLUTION RESPIRATORY (INHALATION) at 20:29

## 2023-12-12 RX ADMIN — IPRATROPIUM BROMIDE AND ALBUTEROL SULFATE 3 ML: 2.5; .5 SOLUTION RESPIRATORY (INHALATION) at 14:55

## 2023-12-12 RX ADMIN — GABAPENTIN 100 MG: 100 CAPSULE ORAL at 11:06

## 2023-12-12 RX ADMIN — ACETAMINOPHEN 975 MG: 325 TABLET ORAL at 20:42

## 2023-12-12 RX ADMIN — CITALOPRAM HYDROBROMIDE 40 MG: 20 TABLET ORAL at 11:06

## 2023-12-12 RX ADMIN — ATORVASTATIN CALCIUM 40 MG: 40 TABLET, FILM COATED ORAL at 20:42

## 2023-12-12 RX ADMIN — PIPERACILLIN SODIUM AND TAZOBACTAM SODIUM 2.25 G: 2; .25 INJECTION, SOLUTION INTRAVENOUS at 18:33

## 2023-12-12 RX ADMIN — PIPERACILLIN SODIUM AND TAZOBACTAM SODIUM 2.25 G: 2; .25 INJECTION, SOLUTION INTRAVENOUS at 00:15

## 2023-12-12 ASSESSMENT — PAIN - FUNCTIONAL ASSESSMENT
PAIN_FUNCTIONAL_ASSESSMENT: 0-10
PAIN_FUNCTIONAL_ASSESSMENT: 0-10

## 2023-12-12 ASSESSMENT — COGNITIVE AND FUNCTIONAL STATUS - GENERAL
HELP NEEDED FOR BATHING: A LOT
TOILETING: A LOT
DRESSING REGULAR UPPER BODY CLOTHING: A LOT
STANDING UP FROM CHAIR USING ARMS: A LOT
MOVING TO AND FROM BED TO CHAIR: A LOT
DRESSING REGULAR UPPER BODY CLOTHING: A LOT
CLIMB 3 TO 5 STEPS WITH RAILING: TOTAL
MOBILITY SCORE: 10
DAILY ACTIVITIY SCORE: 15
CLIMB 3 TO 5 STEPS WITH RAILING: TOTAL
PERSONAL GROOMING: A LITTLE
DAILY ACTIVITIY SCORE: 15
DRESSING REGULAR LOWER BODY CLOTHING: A LOT
MOVING FROM LYING ON BACK TO SITTING ON SIDE OF FLAT BED WITH BEDRAILS: A LOT
MOVING FROM LYING ON BACK TO SITTING ON SIDE OF FLAT BED WITH BEDRAILS: A LOT
PERSONAL GROOMING: A LITTLE
STANDING UP FROM CHAIR USING ARMS: A LOT
MOVING TO AND FROM BED TO CHAIR: A LOT
WALKING IN HOSPITAL ROOM: TOTAL
MOBILITY SCORE: 10
TURNING FROM BACK TO SIDE WHILE IN FLAT BAD: A LOT
TOILETING: A LOT
TURNING FROM BACK TO SIDE WHILE IN FLAT BAD: A LOT
WALKING IN HOSPITAL ROOM: TOTAL
HELP NEEDED FOR BATHING: A LOT
DRESSING REGULAR LOWER BODY CLOTHING: A LOT

## 2023-12-12 ASSESSMENT — PAIN SCALES - GENERAL
PAINLEVEL_OUTOF10: 0 - NO PAIN

## 2023-12-12 NOTE — PROGRESS NOTES
12/12/23 1320   Discharge Planning   Living Arrangements Spouse/significant other;Other (Comment)  ([facility] with spouse)   Support Systems Spouse/significant other   Assistance Needed patient is alert and oriented x3, [place], son reports sometimes needs assistance for ADL;s, has a rollator and a wheelchair   Type of Residence Assisted living   Care Facility Name [facility]   Who is requesting discharge planning? Provider   Home or Post Acute Services In home services   Type of Post Acute Facility Services Assisted living   Type of Home Care Services Home OT;Home PT   Patient expects to be discharged to: TBD pending therapy     12/12/2023 1045: Spoke to patient and son at bedside regarding PT/OT recommendations, preference is to discharge back to [facility] the [facility]. Explained the recommendations were more along the lines of a SNF, but would reach out to [facility] staff Rianna to determine if patients needs exceed their capabilities.   12/12/2023 1115: Call to Rianna at [facility] at the [facility] she would like to do an onsite assessment. Arranged for therapy evals to coincide with on site visit. Unfortunately Rianna is unable to do on site visit today. Therapy made aware to evaluate patient regardless of availability of [facility] staff.

## 2023-12-12 NOTE — PROGRESS NOTES
"Occupational Therapy                 Therapy Communication Note    Patient Name: Melecio Whaley  MRN: [id number]  Today's Date: 12/12/2023     Discipline: Occupational Therapy    Missed Visit Reason: Patient refused (Pt provided tangential somatic complaints and refused offered treatment. \"I told that other therapist, I can't do anything till the abdominal pain goes away and I can feel better.\")    Missed Time: Attempt    "

## 2023-12-12 NOTE — PROGRESS NOTES
"Melecio Whaley is a 88 y.o. male on day 5 of admission presenting with DENA (acute kidney injury) (CMS/HCC).    Subjective   Pt wants to leave when he can. Luana was taken out this AM and pt said he has been having accidents on himself.        Objective     Physical Exam  Vitals and nursing note reviewed.   Constitutional:       General: He is not in acute distress.     Appearance: Normal appearance. He is not ill-appearing or toxic-appearing.   HENT:      Head: Normocephalic and atraumatic.      Mouth/Throat:      Mouth: Mucous membranes are moist.   Eyes:      Extraocular Movements: Extraocular movements intact.      Conjunctiva/sclera: Conjunctivae normal.      Pupils: Pupils are equal, round, and reactive to light.   Cardiovascular:      Rate and Rhythm: Normal rate and regular rhythm.      Heart sounds: No murmur heard.     No gallop.   Pulmonary:      Effort: Pulmonary effort is normal. No respiratory distress.      Breath sounds: Normal breath sounds. No wheezing, rhonchi or rales.   Abdominal:      General: Abdomen is flat. Bowel sounds are normal. There is no distension.      Palpations: Abdomen is soft. There is no mass.      Tenderness: There is no abdominal tenderness.   Musculoskeletal:         General: Swelling present. No tenderness. Normal range of motion.      Cervical back: Normal range of motion and neck supple.   Skin:     General: Skin is warm and dry.   Neurological:      Mental Status: He is alert and oriented to person, place, and time.      Motor: Weakness present.   Psychiatric:         Mood and Affect: Mood normal.         Behavior: Behavior normal.         Thought Content: Thought content normal.         Judgment: Judgment normal.         Last Recorded Vitals:  /71 (BP Location: Left arm, Patient Position: Lying)   Pulse 59   Temp 36.2 °C (97.2 °F)   Resp 20   Ht 1.829 m (6' 0.01\")   Wt 123 kg (270 lb 8.1 oz)   SpO2 98%   BMI 36.68 kg/m²      Scheduled " medications:  acetaminophen, 975 mg, oral, BID  aspirin, 81 mg, oral, Daily  atorvastatin, 40 mg, oral, Nightly  buPROPion XL, 150 mg, oral, Daily  citalopram, 40 mg, oral, Daily  furosemide, 40 mg, oral, BID  gabapentin, 100 mg, oral, TID  ipratropium-albuteroL, 3 mL, nebulization, TID  melatonin, 3 mg, oral, Daily  [Held by provider] oxybutynin, 5 mg, oral, TID  perflutren protein A microsphere, 0.5 mL, intravenous, Once in imaging  piperacillin-tazobactam, 2.25 g, intravenous, q6h  polyethylene glycol, 17 g, oral, Daily  sulfur hexafluoride microsphr, 2 mL, intravenous, Once in imaging  warfarin, 2.5 mg, oral, Daily      Continuous medications:  [Held by provider] sodium chloride 0.9%, 100 mL/hr, Last Rate: 100 mL/hr (12/10/23 1625)      PRN medications:  PRN medications: acetaminophen, albuterol, zinc oxide     Relevant Results:  Results for orders placed or performed during the hospital encounter of 12/07/23 (from the past 24 hour(s))   Protime-INR   Result Value Ref Range    Protime 15.6 (H) 9.8 - 12.8 seconds    INR 1.4 (H) 0.9 - 1.1   CBC   Result Value Ref Range    WBC 10.8 4.4 - 11.3 x10*3/uL    nRBC 0.0 0.0 - 0.0 /100 WBCs    RBC 3.80 (L) 4.50 - 5.90 x10*6/uL    Hemoglobin 11.3 (L) 13.5 - 17.5 g/dL    Hematocrit 35.6 (L) 41.0 - 52.0 %    MCV 94 80 - 100 fL    MCH 29.7 26.0 - 34.0 pg    MCHC 31.7 (L) 32.0 - 36.0 g/dL    RDW 15.6 (H) 11.5 - 14.5 %    Platelets 200 150 - 450 x10*3/uL   Basic Metabolic Panel   Result Value Ref Range    Glucose 85 74 - 99 mg/dL    Sodium 142 136 - 145 mmol/L    Potassium 3.5 3.5 - 5.3 mmol/L    Chloride 100 98 - 107 mmol/L    Bicarbonate 34 (H) 21 - 32 mmol/L    Anion Gap 12 10 - 20 mmol/L    Urea Nitrogen 27 (H) 6 - 23 mg/dL    Creatinine 1.49 (H) 0.50 - 1.30 mg/dL    eGFR 45 (L) >60 mL/min/1.73m*2    Calcium 7.9 (L) 8.6 - 10.3 mg/dL       No results found.          Assessment/Plan   Principal Problem:    DENA (acute kidney injury) (CMS/HCC)  Active Problems:    Altered mental  status, unspecified altered mental status type    Acute metabolic encephalopathy 2/2 infectious process with UTI and dehydration  Back to baseline  -unable to get MRI as pt cannot lie flat  -Neurology says that stroke is unlikely, recommends PT  - ID following  - zosyn    Gross hematuria  Urine retention  Complicated UTI, UC morganella morganii  - Urology following  - voiding trial today  - Holding ditropan  - Hg stable  - zosyn  - ID following    Diastolic HF exacerbation  - cardio following  - statin  - ASA  - lasix    A fib with subtherapeutic INR  A flutter with slow response  -Cardiology recommends holding Cardizem and Tikosyn for now.   - increase coumadin   - daily INR    DENA  Cr stable  - can continue furosemide  - Monitor renal function    Neuropathy  - gabapentin    Mechanical fall   - trauma recs appreciated     Sacral wound  -Does not appear infected  -Wound care      Depression  - wellbutrin   - celexa    DVT ppx: coumadin  Dispo: monitor clinically  Awaiting safe dispo, as [place] wants to go back to [place], they will do site visit tomorrow            Zaria Ly MD  Hospitalist

## 2023-12-12 NOTE — PROGRESS NOTES
Physical Therapy                 Therapy Communication Note    Patient Name: Melecio Whaley  MRN: [id number]  Today's Date: 12/12/2023     Discipline: Physical Therapy    Missed Visit Reason: Missed Visit Reason: Patient refused (Patient initially agreeable to tx, chair set up and goal of sitting up out of bed discussed with patient. Patient refused tx at that time. Educated on importance of mobility, his goal of returning to [place], pt verbalized understanding however refused tx)    Missed Time: Attempt    Comment:

## 2023-12-12 NOTE — PROGRESS NOTES
Melecio Whaley is a 88 y.o. male on day 5 of admission presenting with DENA (acute kidney injury) (CMS/[facility]).    Subjective   Interval History: no fever, no new complaints         Review of Systems    Objective   Range of Vitals (last 24 hours)  Heart Rate:  [68-82]   Temp:  [36.1 °C (97 °F)-36.5 °C (97.7 °F)]   Resp:  [20]   BP: (109-148)/(58-81)   SpO2:  [95 %-97 %]   Daily Weight  12/09/23 : 123 kg (270 lb 8.1 oz)    Body mass index is 36.68 kg/m².    Physical Exam  Constitutional:       Appearance: Normal appearance.   HENT:      Head: Normocephalic and atraumatic.      Mouth/Throat:      Mouth: Mucous membranes are moist.      Pharynx: Oropharynx is clear.   Eyes:      Pupils: Pupils are equal, round, and reactive to light.   Cardiovascular:      Rate and Rhythm: Normal rate and regular rhythm.      Heart sounds: Normal heart sounds.   Pulmonary:      Effort: Pulmonary effort is normal.      Breath sounds: Normal breath sounds.   Abdominal:      General: Abdomen is flat. Bowel sounds are normal.      Palpations: Abdomen is soft.   Musculoskeletal:      Cervical back: Normal range of motion.   Neurological:      Mental Status: He is alert.         Antibiotics  sodium chloride 0.9 % bolus 500 mL  buPROPion (Wellbutrin SR) 12 hr tablet  dilTIAZem (Dilacor XR) 24 hr capsule  dilTIAZem (Dilacor XR) 24 hr capsule  sodium chloride 0.9% infusion  enoxaparin (Lovenox) syringe 40 mg  sodium chloride 0.9% infusion  acetaminophen (Tylenol) tablet 650 mg  melatonin tablet 3 mg  polyethylene glycol (Glycolax, Miralax) packet 17 g  ondansetron (Zofran) tablet 4 mg  ondansetron (Zofran) injection 4 mg  citalopram (CeleXA) tablet 40 mg  gabapentin (Neurontin) capsule 300 mg  simvastatin (Zocor) tablet 20 mg  warfarin (Coumadin) tablet 2.5 mg  acetaminophen (Tylenol) tablet 975 mg  cefTRIAXone (Rocephin) IVPB 1 g  warfarin (Coumadin) tablet 5 mg  dofetilide (Tikosyn) capsule 250 mcg  perflutren lipid microspheres (Definity)  "injection 0.5-10 mL of dilution  sulfur hexafluoride microsphr (Lumason) injection 24.28 mg  perflutren protein A microsphere (Optison) injection 0.5 mL  buPROPion SR (Wellbutrin SR) 12 hr tablet 150 mg  ALPRAZolam (Xanax) tablet 1 mg  dilTIAZem XR (Dilacor XR) 24 hr capsule 240 mg  furosemide (Lasix) tablet 40 mg  oxybutynin (Ditropan) tablet 5 mg  gabapentin (Neurontin) capsule 100 mg  buPROPion XL (Wellbutrin XL) 24 hr tablet 150 mg  atorvastatin (Lipitor) tablet 40 mg  aspirin EC tablet 81 mg  zinc oxide 40 % ointment  furosemide (Lasix) injection 40 mg  ipratropium-albuteroL (Duo-Neb) 0.5-2.5 mg/3 mL nebulizer solution 3 mL  ipratropium-albuteroL (Duo-Neb) 0.5-2.5 mg/3 mL nebulizer solution 3 mL  albuterol 2.5 mg /3 mL (0.083 %) nebulizer solution 2.5 mg  cefTRIAXone (Rocephin) IVPB 1 g  piperacillin-tazobactam-dextrose (Zosyn) IV 2.25 g  furosemide (Lasix) tablet 40 mg  warfarin (Coumadin) tablet 2.5 mg      Relevant Results  Labs  Results from last 72 hours   Lab Units 12/12/23  0631 12/11/23  0625 12/10/23  0627   WBC AUTO x10*3/uL 10.8 13.1* 17.5*   HEMOGLOBIN g/dL 11.3* 11.5* 12.5*   HEMATOCRIT % 35.6* 37.1* 39.2*   PLATELETS AUTO x10*3/uL 200 194 219       Results from last 72 hours   Lab Units 12/12/23  0631 12/11/23  0624 12/10/23  0627   SODIUM mmol/L 142 142 142   POTASSIUM mmol/L 3.5 3.5 3.9   CHLORIDE mmol/L 100 105 105   CO2 mmol/L 34* 29 27   BUN mg/dL 27* 32* 33*   CREATININE mg/dL 1.49* 1.44* 1.92*   GLUCOSE mg/dL 85 102* 107*   CALCIUM mg/dL 7.9* 8.1* 8.3*   ANION GAP mmol/L 12 12 14   EGFR mL/min/1.73m*2 45* 47* 34*           Estimated Creatinine Clearance: 48.2 mL/min (A) (by C-G formula based on SCr of 1.49 mg/dL (H)).  No results found for: \"CRP\"  Microbiology  Susceptibility data from last 14 days.  Collected Specimen Info Organism Amoxicillin/Clavulanate Ampicillin Ampicillin/Sulbactam Cefazolin Ceftriaxone Ciprofloxacin Gentamicin Imipenem Piperacillin/Tazobactam " Trimethoprim/Sulfamethoxazole   12/07/23 Urine from Clean Catch/Voided Morganella morganii R R I R S S S I S S       Imaging  reviewed        Assessment/Plan   UTI, the urine with Morganella   Encephalopathy, likely metabolic  Leukocytosis, resolved     Recommendations :  Continue Zosyn, plan on oral with discharge  Discussed with the medical team     I spent minutes in the professional and overall care of this patient.      Renzo Orozoc MD

## 2023-12-13 LAB
ALBUMIN SERPL BCP-MCNC: 3.1 G/DL (ref 3.4–5)
ALP SERPL-CCNC: 39 U/L (ref 33–136)
ALT SERPL W P-5'-P-CCNC: 14 U/L (ref 10–52)
ANION GAP SERPL CALC-SCNC: 13 MMOL/L (ref 10–20)
AST SERPL W P-5'-P-CCNC: 15 U/L (ref 9–39)
BASOPHILS # BLD AUTO: 0.06 X10*3/UL (ref 0–0.1)
BASOPHILS NFR BLD AUTO: 0.5 %
BILIRUB SERPL-MCNC: 0.6 MG/DL (ref 0–1.2)
BUN SERPL-MCNC: 25 MG/DL (ref 6–23)
CALCIUM SERPL-MCNC: 8.1 MG/DL (ref 8.6–10.3)
CHLORIDE SERPL-SCNC: 98 MMOL/L (ref 98–107)
CO2 SERPL-SCNC: 32 MMOL/L (ref 21–32)
CREAT SERPL-MCNC: 1.22 MG/DL (ref 0.5–1.3)
EOSINOPHIL # BLD AUTO: 0.88 X10*3/UL (ref 0–0.4)
EOSINOPHIL NFR BLD AUTO: 7.7 %
ERYTHROCYTE [DISTWIDTH] IN BLOOD BY AUTOMATED COUNT: 15.5 % (ref 11.5–14.5)
GFR SERPL CREATININE-BSD FRML MDRD: 58 ML/MIN/1.73M*2
GLUCOSE SERPL-MCNC: 85 MG/DL (ref 74–99)
HCT VFR BLD AUTO: 39.3 % (ref 41–52)
HGB BLD-MCNC: 12.5 G/DL (ref 13.5–17.5)
IMM GRANULOCYTES # BLD AUTO: 0.07 X10*3/UL (ref 0–0.5)
IMM GRANULOCYTES NFR BLD AUTO: 0.6 % (ref 0–0.9)
INR PPP: 1.3 (ref 0.9–1.1)
LYMPHOCYTES # BLD AUTO: 0.8 X10*3/UL (ref 0.8–3)
LYMPHOCYTES NFR BLD AUTO: 7 %
MCH RBC QN AUTO: 29.5 PG (ref 26–34)
MCHC RBC AUTO-ENTMCNC: 31.8 G/DL (ref 32–36)
MCV RBC AUTO: 93 FL (ref 80–100)
MONOCYTES # BLD AUTO: 0.84 X10*3/UL (ref 0.05–0.8)
MONOCYTES NFR BLD AUTO: 7.3 %
NEUTROPHILS # BLD AUTO: 8.83 X10*3/UL (ref 1.6–5.5)
NEUTROPHILS NFR BLD AUTO: 76.9 %
NRBC BLD-RTO: 0 /100 WBCS (ref 0–0)
PLATELET # BLD AUTO: 226 X10*3/UL (ref 150–450)
POTASSIUM SERPL-SCNC: 3.6 MMOL/L (ref 3.5–5.3)
PROT SERPL-MCNC: 6 G/DL (ref 6.4–8.2)
PROTHROMBIN TIME: 14.9 SECONDS (ref 9.8–12.8)
RBC # BLD AUTO: 4.24 X10*6/UL (ref 4.5–5.9)
SODIUM SERPL-SCNC: 139 MMOL/L (ref 136–145)
WBC # BLD AUTO: 11.5 X10*3/UL (ref 4.4–11.3)

## 2023-12-13 PROCEDURE — 97530 THERAPEUTIC ACTIVITIES: CPT | Mod: GP

## 2023-12-13 PROCEDURE — 36415 COLL VENOUS BLD VENIPUNCTURE: CPT | Performed by: STUDENT IN AN ORGANIZED HEALTH CARE EDUCATION/TRAINING PROGRAM

## 2023-12-13 PROCEDURE — 1100000001 HC PRIVATE ROOM DAILY

## 2023-12-13 PROCEDURE — 85610 PROTHROMBIN TIME: CPT | Performed by: STUDENT IN AN ORGANIZED HEALTH CARE EDUCATION/TRAINING PROGRAM

## 2023-12-13 PROCEDURE — 99232 SBSQ HOSP IP/OBS MODERATE 35: CPT | Performed by: STUDENT IN AN ORGANIZED HEALTH CARE EDUCATION/TRAINING PROGRAM

## 2023-12-13 PROCEDURE — 2500000004 HC RX 250 GENERAL PHARMACY W/ HCPCS (ALT 636 FOR OP/ED): Performed by: STUDENT IN AN ORGANIZED HEALTH CARE EDUCATION/TRAINING PROGRAM

## 2023-12-13 PROCEDURE — 2500000002 HC RX 250 W HCPCS SELF ADMINISTERED DRUGS (ALT 637 FOR MEDICARE OP, ALT 636 FOR OP/ED): Performed by: NURSE PRACTITIONER

## 2023-12-13 PROCEDURE — 2500000004 HC RX 250 GENERAL PHARMACY W/ HCPCS (ALT 636 FOR OP/ED): Performed by: NURSE PRACTITIONER

## 2023-12-13 PROCEDURE — 2500000004 HC RX 250 GENERAL PHARMACY W/ HCPCS (ALT 636 FOR OP/ED): Performed by: INTERNAL MEDICINE

## 2023-12-13 PROCEDURE — 2500000001 HC RX 250 WO HCPCS SELF ADMINISTERED DRUGS (ALT 637 FOR MEDICARE OP)

## 2023-12-13 PROCEDURE — 97535 SELF CARE MNGMENT TRAINING: CPT | Mod: GO,CO,59

## 2023-12-13 PROCEDURE — 85025 COMPLETE CBC W/AUTO DIFF WBC: CPT | Performed by: STUDENT IN AN ORGANIZED HEALTH CARE EDUCATION/TRAINING PROGRAM

## 2023-12-13 PROCEDURE — 94640 AIRWAY INHALATION TREATMENT: CPT

## 2023-12-13 PROCEDURE — 2500000001 HC RX 250 WO HCPCS SELF ADMINISTERED DRUGS (ALT 637 FOR MEDICARE OP): Performed by: NURSE PRACTITIONER

## 2023-12-13 PROCEDURE — 80053 COMPREHEN METABOLIC PANEL: CPT | Performed by: STUDENT IN AN ORGANIZED HEALTH CARE EDUCATION/TRAINING PROGRAM

## 2023-12-13 RX ADMIN — PIPERACILLIN SODIUM AND TAZOBACTAM SODIUM 2.25 G: 2; .25 INJECTION, SOLUTION INTRAVENOUS at 11:42

## 2023-12-13 RX ADMIN — GABAPENTIN 100 MG: 100 CAPSULE ORAL at 14:56

## 2023-12-13 RX ADMIN — IPRATROPIUM BROMIDE AND ALBUTEROL SULFATE 3 ML: 2.5; .5 SOLUTION RESPIRATORY (INHALATION) at 08:45

## 2023-12-13 RX ADMIN — GABAPENTIN 100 MG: 100 CAPSULE ORAL at 09:34

## 2023-12-13 RX ADMIN — BUPROPION HYDROCHLORIDE 150 MG: 150 TABLET, FILM COATED, EXTENDED RELEASE ORAL at 09:34

## 2023-12-13 RX ADMIN — ASPIRIN 81 MG: 81 TABLET, COATED ORAL at 09:34

## 2023-12-13 RX ADMIN — FUROSEMIDE 40 MG: 40 TABLET ORAL at 05:14

## 2023-12-13 RX ADMIN — PIPERACILLIN SODIUM AND TAZOBACTAM SODIUM 2.25 G: 2; .25 INJECTION, SOLUTION INTRAVENOUS at 05:15

## 2023-12-13 RX ADMIN — GABAPENTIN 100 MG: 100 CAPSULE ORAL at 20:54

## 2023-12-13 RX ADMIN — IPRATROPIUM BROMIDE AND ALBUTEROL SULFATE 3 ML: 2.5; .5 SOLUTION RESPIRATORY (INHALATION) at 15:12

## 2023-12-13 RX ADMIN — Medication 3 MG: at 18:08

## 2023-12-13 RX ADMIN — PIPERACILLIN SODIUM AND TAZOBACTAM SODIUM 2.25 G: 2; .25 INJECTION, SOLUTION INTRAVENOUS at 00:03

## 2023-12-13 RX ADMIN — ATORVASTATIN CALCIUM 40 MG: 40 TABLET, FILM COATED ORAL at 20:54

## 2023-12-13 RX ADMIN — IPRATROPIUM BROMIDE AND ALBUTEROL SULFATE 3 ML: 2.5; .5 SOLUTION RESPIRATORY (INHALATION) at 21:04

## 2023-12-13 RX ADMIN — ACETAMINOPHEN 975 MG: 325 TABLET ORAL at 20:54

## 2023-12-13 RX ADMIN — CITALOPRAM HYDROBROMIDE 40 MG: 20 TABLET ORAL at 09:34

## 2023-12-13 RX ADMIN — WARFARIN SODIUM 5 MG: 5 TABLET ORAL at 17:16

## 2023-12-13 RX ADMIN — FUROSEMIDE 40 MG: 40 TABLET ORAL at 20:54

## 2023-12-13 RX ADMIN — PIPERACILLIN SODIUM AND TAZOBACTAM SODIUM 2.25 G: 2; .25 INJECTION, SOLUTION INTRAVENOUS at 17:16

## 2023-12-13 RX ADMIN — POLYETHYLENE GLYCOL 3350 17 G: 17 POWDER, FOR SOLUTION ORAL at 09:34

## 2023-12-13 RX ADMIN — ACETAMINOPHEN 975 MG: 325 TABLET ORAL at 09:34

## 2023-12-13 ASSESSMENT — COGNITIVE AND FUNCTIONAL STATUS - GENERAL
DRESSING REGULAR LOWER BODY CLOTHING: A LOT
HELP NEEDED FOR BATHING: A LOT
TURNING FROM BACK TO SIDE WHILE IN FLAT BAD: A LOT
STANDING UP FROM CHAIR USING ARMS: A LOT
MOVING FROM LYING ON BACK TO SITTING ON SIDE OF FLAT BED WITH BEDRAILS: A LOT
DRESSING REGULAR UPPER BODY CLOTHING: A LOT
PERSONAL GROOMING: A LITTLE
DAILY ACTIVITIY SCORE: 15
DRESSING REGULAR LOWER BODY CLOTHING: A LOT
CLIMB 3 TO 5 STEPS WITH RAILING: TOTAL
HELP NEEDED FOR BATHING: A LOT
PERSONAL GROOMING: A LITTLE
DAILY ACTIVITIY SCORE: 15
DRESSING REGULAR UPPER BODY CLOTHING: A LOT
TOILETING: A LOT
MOBILITY SCORE: 11
TOILETING: A LOT
WALKING IN HOSPITAL ROOM: A LOT
MOVING TO AND FROM BED TO CHAIR: A LOT

## 2023-12-13 ASSESSMENT — PAIN SCALES - GENERAL
PAINLEVEL_OUTOF10: 0 - NO PAIN

## 2023-12-13 ASSESSMENT — PAIN - FUNCTIONAL ASSESSMENT
PAIN_FUNCTIONAL_ASSESSMENT: 0-10
PAIN_FUNCTIONAL_ASSESSMENT: 0-10

## 2023-12-13 ASSESSMENT — ACTIVITIES OF DAILY LIVING (ADL): HOME_MANAGEMENT_TIME_ENTRY: 8

## 2023-12-13 NOTE — PROGRESS NOTES
Contacted overnight for retention of urine of 561 ml and will straight cath if cannot void.    Fatoumata Lebron, APRN-CNP

## 2023-12-13 NOTE — CARE PLAN
The patient's goals for the shift include  to gest at least 6 hours of sleep.    The clinical goals for the shift include pt will remain comfortable throughout shift    Over the shift, the patient did make progress toward the following goals.

## 2023-12-13 NOTE — PROGRESS NOTES
12/13/23 1322   Discharge Planning   Living Arrangements Spouse/significant other;Other (Comment)  ([facility] with spouse)   Support Systems Spouse/significant other   Assistance Needed patient is alert and oriented x3, [place], son reports sometimes needs assistance for ADL;s, has a rollator and a wheelchair   Type of Residence Assisted living   Care Facility Name [facility]   Who is requesting discharge planning? Provider   Home or Post Acute Services Post acute facilities (Rehab/SNF/etc)   Type of Post Acute Facility Services Skilled nursing   Patient expects to be discharged to: new SNF- PAQN list provided to family     12/13/2023 1245: skilled nursing came to visit patient- facility will NOT be able to accommodate patient needs at this time. Son and skilled nursing staff agree patient needs to transition to SNF. PAQN list left for patient and son to review.     12/13/2023 1421: Spoke to patient and son regarding preferences from PAQN list- 1.[facility] 2. [facility] and 3. [facility] [place]. PCN made aware of preferences.

## 2023-12-13 NOTE — PROGRESS NOTES
Occupational Therapy    Occupational Therapy Treatment    Name: Melecio Whaley  MRN: [id number]  : 1935  Date: 23  Time Calculation  Start Time: 1101  Stop Time: 1117  Time Calculation (min): 16 min    Assessment:  OT Assessment: Pt is an 88 y/o male who was admitted for multiple medical complaints. Pt continues to present with decreased sitting/standing balance, decreased though WFL cognition, endurance, and strength. Pt continues to benefit from skilled OT services to increase independence with ADL's, transfers, and mobility. Mod level of OT followthrough in discharge setting continues to be recommended.  Prognosis: Good  Barriers to Discharge: None  Evaluation/Treatment Tolerance: Patient limited by fatigue, Other (Comment) (diminished functional balance and strength for mobility.)  Medical Staff Made Aware: Yes  End of Session Communication: Bedside nurse, PCT/NA/CTA  End of Session Patient Position: Up in chair, Alarm on  Plan:  Treatment Interventions: ADL retraining, Functional transfer training, Endurance training  OT Frequency: 3 times per week  OT Discharge Recommendations: Moderate intensity level of continued care  Equipment Recommended upon Discharge: Wheeled walker  OT Recommended Transfer Status: Assist of 2  OT - OK to Discharge: Yes    Subjective   Previous Visit Info:  OT Last Visit  OT Received On: 23  General:  General  Reason for Referral: Pt is a 88 y/o male  presenting with multiple complaints including new waxing and waning slurred speech with a very dry mouth, urinary incontinence at baseline with malodorous urine, increased dyspnea with wheezing and leg edema, and recent fall out of bed.  Referred By: DONALD Linn  Past Medical History Relevant to Rehab: Past medical history of diastolic CHF (congestive heart failure) (CMS/HCC), Depression, Heart disease, and atrial fib and on coumadin, neuropathy, NED and refuses CPAP, anxiety/depression, CAD, HPLD, dyspnea, cognitive  "impairment, HTN.  Missed Visit: Yes  Missed Visit Reason: Patient refused (Pt provided tangential somatic complaints and refused offered treatment. \"I told that other therapist, I can't do anything till the abdominal pain goes away and I can feel better.\")  Family/Caregiver Present: Yes (Son with [facility] rep at bedside upon arrival)  Caregiver Feedback: discussed DME set-up and available equipment at home at [facility].  Co-Treatment: PT  Co-Treatment Reason: To maximize pt's outcomes  Prior to Session Communication: Bedside nurse, Care Coordinator  Patient Position Received:  (Seated at EOB with PT.)  General Comment: Patient pleasant, cooperative and motivated; agreeable to tx  Precautions:  Medical Precautions: Fall precautions (Craft catheter)  Vitals:     Pain Assessment:  Pain Assessment  Pain Assessment: 0-10  Pain Score: 0 - No pain     Objective   Activities of Daily Living: UE Dressing  UE Dressing Level of Assistance: Minimum assistance  UE Dressing Where Assessed: Chair  UE Dressing Comments: Pt required assist to thread arms through sleeves and problem solve task.     Bed Mobility/Transfers: Transfers  Transfer: Yes  Transfer 1  Transfer From 1: Sit to Stand and for transfer Bed to Bedside recliner with FWW.  Transfer to 1: Sit, Stand  Technique 1: Sit to stand, Stand to sit  Transfer Device 1: Walker  Transfer Level of Assistance 1: Moderate assistance  Trials/Comments 1: MOISE intermittent CGA in turning and transitions for safety.    Outcome Measures:[facility] Daily Activity  Putting on and taking off regular lower body clothing: A lot  Bathing (including washing, rinsing, drying): A lot  Putting on and taking off regular upper body clothing: A lot  Toileting, which includes using toilet, bedpan or urinal: A lot  Taking care of personal grooming such as brushing teeth: A little  Eating Meals: None  Daily Activity - Total Score: 15      Education Documentation  Handouts, taught by YAJAIRA Novoa at 12/13/2023 11:44 " AM.  Learner: Patient  Readiness: Acceptance  Method: Explanation, Demonstration  Response: Verbalizes Understanding, Demonstrated Understanding, Needs Reinforcement    Body Mechanics, taught by YAJAIRA Novoa at 12/13/2023 11:44 AM.  Learner: Patient  Readiness: Acceptance  Method: Explanation, Demonstration  Response: Verbalizes Understanding, Demonstrated Understanding, Needs Reinforcement    Precautions, taught by YAJAIRA Novoa at 12/13/2023 11:44 AM.  Learner: Patient  Readiness: Acceptance  Method: Explanation, Demonstration  Response: Verbalizes Understanding, Demonstrated Understanding, Needs Reinforcement    Home Exercise Program, taught by YAJAIRA Novoa at 12/13/2023 11:44 AM.  Learner: Patient  Readiness: Acceptance  Method: Explanation, Demonstration  Response: Verbalizes Understanding, Demonstrated Understanding, Needs Reinforcement    ADL Training, taught by YAJAIRA Novoa at 12/13/2023 11:44 AM.  Learner: Patient  Readiness: Acceptance  Method: Explanation, Demonstration  Response: Verbalizes Understanding, Demonstrated Understanding, Needs Reinforcement    Education Comments  No comments found.      Goals:  Encounter Problems       Encounter Problems (Active)       Balance       Pt will demo GOOD sitting balance and FAIR+ static/dynamic standing balance during ADL and functional activity with FWW >2 minutes with UE support for improved independence and participation in ADL  (Progressing)       Start:  12/08/23    Expected End:  12/22/23               Dressing Upper Extremities       Patient will dress upper body independently  (Progressing)       Start:  12/08/23    Expected End:  12/22/23               Dressings Lower Extremities       Patient to complete lower body dressing with AE as needed at min A  (Progressing)       Start:  12/08/23    Expected End:  12/22/23               Endurance       Pt will increase endurance to tolerate 10 min of activity with no more than 1 rest break in  order to increase ability to engage in ADL completion.  (Progressing)       Start:  12/08/23    Expected End:  12/22/23               Mobility       Pt will demo increased functional mobility to tolerate tasks necessary to complete ADL routine with FWW at min A x 1 (Progressing)       Start:  12/08/23    Expected End:  12/22/23               Toileting       Patient will complete toileting tasks with FWW at min A x 1 (Progressing)       Start:  12/08/23    Expected End:  12/22/23               Transfers       Pt to demonstrate bed mobility at CGA (Progressing)       Start:  12/08/23    Expected End:  12/22/23            Pt to demonstrate transfers with FWW at min A x1 (Progressing)       Start:  12/08/23    Expected End:  12/22/23

## 2023-12-13 NOTE — PROGRESS NOTES
"Melecio Whaley is a 88 y.o. male on day 6 of admission presenting with DENA (acute kidney injury) (CMS/HCC).    Subjective   Pt continues to retain and required to be straight cath. Pt denies new complaints.       Objective     Physical Exam  Vitals and nursing note reviewed.   Constitutional:       General: He is not in acute distress.     Appearance: Normal appearance. He is not ill-appearing or toxic-appearing.   HENT:      Head: Normocephalic and atraumatic.      Mouth/Throat:      Mouth: Mucous membranes are moist.   Eyes:      Extraocular Movements: Extraocular movements intact.      Conjunctiva/sclera: Conjunctivae normal.      Pupils: Pupils are equal, round, and reactive to light.   Cardiovascular:      Rate and Rhythm: Normal rate and regular rhythm.      Heart sounds: No murmur heard.     No gallop.   Pulmonary:      Effort: Pulmonary effort is normal. No respiratory distress.      Breath sounds: Normal breath sounds. No wheezing, rhonchi or rales.   Abdominal:      General: Abdomen is flat. Bowel sounds are normal. There is no distension.      Palpations: Abdomen is soft. There is no mass.      Tenderness: There is no abdominal tenderness.   Musculoskeletal:         General: Swelling present. No tenderness. Normal range of motion.      Cervical back: Normal range of motion and neck supple.   Skin:     General: Skin is warm and dry.   Neurological:      Mental Status: He is alert and oriented to person, place, and time.      Motor: Weakness present.   Psychiatric:         Mood and Affect: Mood normal.         Behavior: Behavior normal.         Thought Content: Thought content normal.         Judgment: Judgment normal.         Last Recorded Vitals:  BP (!) 179/94   Pulse 77   Temp 36.1 °C (97 °F) (Temporal)   Resp 18   Ht 1.829 m (6' 0.01\")   Wt 122 kg (268 lb 4.8 oz)   SpO2 97%   BMI 36.38 kg/m²      Scheduled medications:  acetaminophen, 975 mg, oral, BID  aspirin, 81 mg, oral, " Daily  atorvastatin, 40 mg, oral, Nightly  buPROPion XL, 150 mg, oral, Daily  citalopram, 40 mg, oral, Daily  furosemide, 40 mg, oral, BID  gabapentin, 100 mg, oral, TID  ipratropium-albuteroL, 3 mL, nebulization, TID  melatonin, 3 mg, oral, Daily  [Held by provider] oxybutynin, 5 mg, oral, TID  perflutren protein A microsphere, 0.5 mL, intravenous, Once in imaging  piperacillin-tazobactam, 2.25 g, intravenous, q6h  polyethylene glycol, 17 g, oral, Daily  sulfur hexafluoride microsphr, 2 mL, intravenous, Once in imaging  warfarin, 5 mg, oral, Daily      Continuous medications:  [Held by provider] sodium chloride 0.9%, 100 mL/hr, Last Rate: 100 mL/hr (12/10/23 1625)      PRN medications:  PRN medications: acetaminophen, albuterol, zinc oxide     Relevant Results:  Results for orders placed or performed during the hospital encounter of 12/07/23 (from the past 24 hour(s))   Comprehensive Metabolic Panel   Result Value Ref Range    Glucose 85 74 - 99 mg/dL    Sodium 139 136 - 145 mmol/L    Potassium 3.6 3.5 - 5.3 mmol/L    Chloride 98 98 - 107 mmol/L    Bicarbonate 32 21 - 32 mmol/L    Anion Gap 13 10 - 20 mmol/L    Urea Nitrogen 25 (H) 6 - 23 mg/dL    Creatinine 1.22 0.50 - 1.30 mg/dL    eGFR 58 (L) >60 mL/min/1.73m*2    Calcium 8.1 (L) 8.6 - 10.3 mg/dL    Albumin 3.1 (L) 3.4 - 5.0 g/dL    Alkaline Phosphatase 39 33 - 136 U/L    Total Protein 6.0 (L) 6.4 - 8.2 g/dL    AST 15 9 - 39 U/L    Bilirubin, Total 0.6 0.0 - 1.2 mg/dL    ALT 14 10 - 52 U/L   CBC and Auto Differential   Result Value Ref Range    WBC 11.5 (H) 4.4 - 11.3 x10*3/uL    nRBC 0.0 0.0 - 0.0 /100 WBCs    RBC 4.24 (L) 4.50 - 5.90 x10*6/uL    Hemoglobin 12.5 (L) 13.5 - 17.5 g/dL    Hematocrit 39.3 (L) 41.0 - 52.0 %    MCV 93 80 - 100 fL    MCH 29.5 26.0 - 34.0 pg    MCHC 31.8 (L) 32.0 - 36.0 g/dL    RDW 15.5 (H) 11.5 - 14.5 %    Platelets 226 150 - 450 x10*3/uL    Neutrophils % 76.9 40.0 - 80.0 %    Immature Granulocytes %, Automated 0.6 0.0 - 0.9 %     Lymphocytes % 7.0 13.0 - 44.0 %    Monocytes % 7.3 2.0 - 10.0 %    Eosinophils % 7.7 0.0 - 6.0 %    Basophils % 0.5 0.0 - 2.0 %    Neutrophils Absolute 8.83 (H) 1.60 - 5.50 x10*3/uL    Immature Granulocytes Absolute, Automated 0.07 0.00 - 0.50 x10*3/uL    Lymphocytes Absolute 0.80 0.80 - 3.00 x10*3/uL    Monocytes Absolute 0.84 (H) 0.05 - 0.80 x10*3/uL    Eosinophils Absolute 0.88 (H) 0.00 - 0.40 x10*3/uL    Basophils Absolute 0.06 0.00 - 0.10 x10*3/uL   Protime-INR   Result Value Ref Range    Protime 14.9 (H) 9.8 - 12.8 seconds    INR 1.3 (H) 0.9 - 1.1       No results found.          Assessment/Plan   Principal Problem:    DENA (acute kidney injury) (CMS/[facility])  Active Problems:    Altered mental status, unspecified altered mental status type    Acute metabolic encephalopathy 2/2 infectious process with UTI and dehydration  Back to baseline or at new baseline  -unable to get MRI as pt cannot lie flat  -Neurology says that stroke is unlikely, recommends PT  - ID following  - zosyn    Gross hematuria  Urine retention  Complicated UTI, UC morganella morganii  - Urology following  - failed voiding trial, need daniel  - Holding ditropan  - Hg stable  - zosyn  - ID following    Diastolic HF exacerbation  - cardio following  - statin  - ASA  - lasix    A fib with subtherapeutic INR  A flutter with slow response  -Cardiology recommends holding Cardizem and Tikosyn for now.   - coumadin 10mg daily  - daily INR    DENA  resolved  - can continue furosemide  - Monitor renal function    Neuropathy  - gabapentin    Mechanical fall   - trauma recs appreciated     Sacral wound  -Does not appear infected  -Wound care      Depression  - wellbutrin   - celexa    DVT ppx: coumadin  Dispo: monitor clinically  Awaiting safe dispo, as [place] wants to go back to [place], they will do site visit today            Zaria Ly MD  Hospitalist

## 2023-12-13 NOTE — PROGRESS NOTES
"Melecio Whaley is a 88 y.o. male on day 5 of admission presenting with DENA (acute kidney injury) (CMS/[facility]).    Subjective   Catheter removed reports voided x1       Objective     Physical Exam  No distress  Normal resp effort  Abdomen soft, NT        Last Recorded Vitals  Blood pressure 134/76, pulse 75, temperature 36.2 °C (97.2 °F), resp. rate 16, height 1.829 m (6' 0.01\"), weight 123 kg (270 lb 8.1 oz), SpO2 96 %.  Intake/Output last 3 Shifts:  I/O last 3 completed shifts:  In: 840 (6.8 mL/kg) [P.O.:840]  Out: 750 (6.1 mL/kg) [Urine:750 (0.2 mL/kg/hr)]  Weight: 122.7 kg     Relevant Results                             Assessment/Plan   Principal Problem:    DENA (acute kidney injury) (CMS/[facility])  Active Problems:    Altered mental status, unspecified altered mental status type    87 year old had urinary retention and DENA.  He had gross hematuria following catheter placement.  His catheter required replacement into the bladder.  Voiding trial ongoing.  Monitor voiding    Follow up in office after discharge        Rogerio Bautista MD      "

## 2023-12-13 NOTE — PROGRESS NOTES
"Melecio Whaley is a 88 y.o. male on day 6 of admission presenting with DENA (acute kidney injury) (CMS/[facility]).    Subjective He has been retaining.  Intermittent cath performed last night for 500cc.          Objective     Physical Exam  Alert, no distress  Normal resp effort  Abdomen soft, nontender    Last Recorded Vitals  Blood pressure (!) 179/94, pulse 77, temperature 36.1 °C (97 °F), temperature source Temporal, resp. rate 18, height 1.829 m (6' 0.01\"), weight 122 kg (268 lb 4.8 oz), SpO2 97 %.  Intake/Output last 3 Shifts:  I/O last 3 completed shifts:  In: 120 (1 mL/kg) [P.O.:120]  Out: 1775 (14.6 mL/kg) [Urine:1775 (0.4 mL/kg/hr)]  Weight: 121.7 kg     Relevant Results                             Assessment/Plan   Principal Problem:    DENA (acute kidney injury) (CMS/[facility])  Active Problems:    Altered mental status, unspecified altered mental status type    87 year old had urinary retention and DENA.  He had gross hematuria following catheter placement.    His catheter required replacement into the bladder.  He has failed a voiding trial and the catheter will be replaced again.     Follow up in office after discharge regarding retention.      Rogerio Bautista MD      "

## 2023-12-13 NOTE — PROGRESS NOTES
12/13/2023 1430pm [facility]  Made aware patient to transition to snf upon discharge and preference is 1)[facility] and 2)[facility]. Patient with [employer] insurance so precert needed for snf transition. Referrals sent via careport to facilities for review and awaiting response.     12/13/2023 1459pm [facility]  [facility] unable to accept due to bed availability. Awaiting response from [facility].     12/14/2023 0654am [facility]  [facility] is able to accept patient. Will follow up with team and start precert today if patient is medically ready. Will continue to follow.     12/14/2023 0819am [facility]  Per Dr Ly request, message sent to [facility] requesting they start precert with [facility].     12/14/2023 1013am [facility]  Authorization received from [facility] for transition to [facility]. Transport confirmed for 12p stretcher via [facility]. 'After visit summary' and goldenrod sent to facility and they were made aware of transport time. Lakshmi Barrett at [facility] to complete 7000 in HENs.

## 2023-12-13 NOTE — PROGRESS NOTES
Melecio Whaley is a 88 y.o. male on day 6 of admission presenting with DENA (acute kidney injury) (CMS/[facility]).    Subjective   Interval History: no fever, no new complaints         Review of Systems    Objective   Range of Vitals (last 24 hours)  Heart Rate:  [59-83]   Temp:  [36.1 °C (97 °F)-36.4 °C (97.5 °F)]   Resp:  [16-20]   BP: (134-179)/(71-94)   Weight:  [122 kg (268 lb 4.8 oz)]   SpO2:  [93 %-98 %]   Daily Weight  12/13/23 : 122 kg (268 lb 4.8 oz)    Body mass index is 36.38 kg/m².    Physical Exam  Constitutional:       Appearance: Normal appearance.   HENT:      Head: Normocephalic and atraumatic.      Mouth/Throat:      Mouth: Mucous membranes are moist.      Pharynx: Oropharynx is clear.   Eyes:      Pupils: Pupils are equal, round, and reactive to light.   Cardiovascular:      Rate and Rhythm: Normal rate and regular rhythm.      Heart sounds: Normal heart sounds.   Pulmonary:      Effort: Pulmonary effort is normal.      Breath sounds: Normal breath sounds.   Abdominal:      General: Abdomen is flat. Bowel sounds are normal.      Palpations: Abdomen is soft.   Musculoskeletal:      Cervical back: Normal range of motion.   Neurological:      Mental Status: He is alert.         Antibiotics  sodium chloride 0.9 % bolus 500 mL  buPROPion (Wellbutrin SR) 12 hr tablet  dilTIAZem (Dilacor XR) 24 hr capsule  dilTIAZem (Dilacor XR) 24 hr capsule  sodium chloride 0.9% infusion  enoxaparin (Lovenox) syringe 40 mg  sodium chloride 0.9% infusion  acetaminophen (Tylenol) tablet 650 mg  melatonin tablet 3 mg  polyethylene glycol (Glycolax, Miralax) packet 17 g  ondansetron (Zofran) tablet 4 mg  ondansetron (Zofran) injection 4 mg  citalopram (CeleXA) tablet 40 mg  gabapentin (Neurontin) capsule 300 mg  simvastatin (Zocor) tablet 20 mg  warfarin (Coumadin) tablet 2.5 mg  acetaminophen (Tylenol) tablet 975 mg  cefTRIAXone (Rocephin) IVPB 1 g  warfarin (Coumadin) tablet 5 mg  dofetilide (Tikosyn) capsule 250  mcg  perflutren lipid microspheres (Definity) injection 0.5-10 mL of dilution  sulfur hexafluoride microsphr (Lumason) injection 24.28 mg  perflutren protein A microsphere (Optison) injection 0.5 mL  buPROPion SR (Wellbutrin SR) 12 hr tablet 150 mg  ALPRAZolam (Xanax) tablet 1 mg  dilTIAZem XR (Dilacor XR) 24 hr capsule 240 mg  furosemide (Lasix) tablet 40 mg  oxybutynin (Ditropan) tablet 5 mg  gabapentin (Neurontin) capsule 100 mg  buPROPion XL (Wellbutrin XL) 24 hr tablet 150 mg  atorvastatin (Lipitor) tablet 40 mg  aspirin EC tablet 81 mg  zinc oxide 40 % ointment  furosemide (Lasix) injection 40 mg  ipratropium-albuteroL (Duo-Neb) 0.5-2.5 mg/3 mL nebulizer solution 3 mL  ipratropium-albuteroL (Duo-Neb) 0.5-2.5 mg/3 mL nebulizer solution 3 mL  albuterol 2.5 mg /3 mL (0.083 %) nebulizer solution 2.5 mg  cefTRIAXone (Rocephin) IVPB 1 g  piperacillin-tazobactam-dextrose (Zosyn) IV 2.25 g  furosemide (Lasix) tablet 40 mg  warfarin (Coumadin) tablet 2.5 mg      Relevant Results  Labs  Results from last 72 hours   Lab Units 12/13/23  0707 12/12/23  0631 12/11/23  0625   WBC AUTO x10*3/uL 11.5* 10.8 13.1*   HEMOGLOBIN g/dL 12.5* 11.3* 11.5*   HEMATOCRIT % 39.3* 35.6* 37.1*   PLATELETS AUTO x10*3/uL 226 200 194   NEUTROS PCT AUTO % 76.9  --   --    LYMPHS PCT AUTO % 7.0  --   --    MONOS PCT AUTO % 7.3  --   --    EOS PCT AUTO % 7.7  --   --        Results from last 72 hours   Lab Units 12/13/23  0707 12/12/23  0631 12/11/23  0624   SODIUM mmol/L 139 142 142   POTASSIUM mmol/L 3.6 3.5 3.5   CHLORIDE mmol/L 98 100 105   CO2 mmol/L 32 34* 29   BUN mg/dL 25* 27* 32*   CREATININE mg/dL 1.22 1.49* 1.44*   GLUCOSE mg/dL 85 85 102*   CALCIUM mg/dL 8.1* 7.9* 8.1*   ANION GAP mmol/L 13 12 12   EGFR mL/min/1.73m*2 58* 45* 47*       Results from last 72 hours   Lab Units 12/13/23  0707   ALK PHOS U/L 39   BILIRUBIN TOTAL mg/dL 0.6   PROTEIN TOTAL g/dL 6.0*   ALT U/L 14   AST U/L 15   ALBUMIN g/dL 3.1*     Estimated Creatinine  "Clearance: 58.6 mL/min (by C-G formula based on SCr of 1.22 mg/dL).  No results found for: \"CRP\"  Microbiology  Susceptibility data from last 14 days.  Collected Specimen Info Organism Amoxicillin/Clavulanate Ampicillin Ampicillin/Sulbactam Cefazolin Ceftriaxone Ciprofloxacin Gentamicin Imipenem Piperacillin/Tazobactam Trimethoprim/Sulfamethoxazole   12/07/23 Urine from Clean Catch/Voided Morganella morganii R R I R S S S I S S       Imaging  reviewed        Assessment/Plan   UTI, the urine with Morganella, needed a Craft for retention  Encephalopathy, likely metabolic  Leukocytosis, resolved     Recommendations :  Continue Zosyn, plan on oral Cipro with discharge  Discussed with the medical team     I spent minutes in the professional and overall care of this patient.      Renzo Orozco MD  "

## 2023-12-14 VITALS
RESPIRATION RATE: 17 BRPM | SYSTOLIC BLOOD PRESSURE: 124 MMHG | TEMPERATURE: 97 F | HEART RATE: 70 BPM | WEIGHT: 268.3 LBS | HEIGHT: 72 IN | DIASTOLIC BLOOD PRESSURE: 76 MMHG | OXYGEN SATURATION: 94 % | BODY MASS INDEX: 36.34 KG/M2

## 2023-12-14 LAB
ALBUMIN SERPL BCP-MCNC: 3.1 G/DL (ref 3.4–5)
ALP SERPL-CCNC: 41 U/L (ref 33–136)
ALT SERPL W P-5'-P-CCNC: 22 U/L (ref 10–52)
ANION GAP SERPL CALC-SCNC: 13 MMOL/L (ref 10–20)
AST SERPL W P-5'-P-CCNC: 23 U/L (ref 9–39)
BASOPHILS # BLD AUTO: 0.09 X10*3/UL (ref 0–0.1)
BASOPHILS NFR BLD AUTO: 0.8 %
BILIRUB SERPL-MCNC: 0.5 MG/DL (ref 0–1.2)
BUN SERPL-MCNC: 28 MG/DL (ref 6–23)
CALCIUM SERPL-MCNC: 8.3 MG/DL (ref 8.6–10.3)
CHLORIDE SERPL-SCNC: 97 MMOL/L (ref 98–107)
CO2 SERPL-SCNC: 33 MMOL/L (ref 21–32)
CREAT SERPL-MCNC: 1.49 MG/DL (ref 0.5–1.3)
EOSINOPHIL # BLD AUTO: 0.93 X10*3/UL (ref 0–0.4)
EOSINOPHIL NFR BLD AUTO: 8 %
ERYTHROCYTE [DISTWIDTH] IN BLOOD BY AUTOMATED COUNT: 15.3 % (ref 11.5–14.5)
GFR SERPL CREATININE-BSD FRML MDRD: 45 ML/MIN/1.73M*2
GLUCOSE SERPL-MCNC: 82 MG/DL (ref 74–99)
HCT VFR BLD AUTO: 37.9 % (ref 41–52)
HGB BLD-MCNC: 12.3 G/DL (ref 13.5–17.5)
IMM GRANULOCYTES # BLD AUTO: 0.05 X10*3/UL (ref 0–0.5)
IMM GRANULOCYTES NFR BLD AUTO: 0.4 % (ref 0–0.9)
INR PPP: 1.6 (ref 0.9–1.1)
LYMPHOCYTES # BLD AUTO: 0.78 X10*3/UL (ref 0.8–3)
LYMPHOCYTES NFR BLD AUTO: 6.7 %
MCH RBC QN AUTO: 29.8 PG (ref 26–34)
MCHC RBC AUTO-ENTMCNC: 32.5 G/DL (ref 32–36)
MCV RBC AUTO: 92 FL (ref 80–100)
MONOCYTES # BLD AUTO: 0.84 X10*3/UL (ref 0.05–0.8)
MONOCYTES NFR BLD AUTO: 7.2 %
NEUTROPHILS # BLD AUTO: 8.95 X10*3/UL (ref 1.6–5.5)
NEUTROPHILS NFR BLD AUTO: 76.9 %
NRBC BLD-RTO: 0 /100 WBCS (ref 0–0)
PLATELET # BLD AUTO: 252 X10*3/UL (ref 150–450)
POTASSIUM SERPL-SCNC: 3.8 MMOL/L (ref 3.5–5.3)
PROT SERPL-MCNC: 6 G/DL (ref 6.4–8.2)
PROTHROMBIN TIME: 18.5 SECONDS (ref 9.8–12.8)
RBC # BLD AUTO: 4.13 X10*6/UL (ref 4.5–5.9)
SODIUM SERPL-SCNC: 139 MMOL/L (ref 136–145)
WBC # BLD AUTO: 11.6 X10*3/UL (ref 4.4–11.3)

## 2023-12-14 PROCEDURE — 94640 AIRWAY INHALATION TREATMENT: CPT

## 2023-12-14 PROCEDURE — 99239 HOSP IP/OBS DSCHRG MGMT >30: CPT | Performed by: STUDENT IN AN ORGANIZED HEALTH CARE EDUCATION/TRAINING PROGRAM

## 2023-12-14 PROCEDURE — 94760 N-INVAS EAR/PLS OXIMETRY 1: CPT

## 2023-12-14 PROCEDURE — 2500000002 HC RX 250 W HCPCS SELF ADMINISTERED DRUGS (ALT 637 FOR MEDICARE OP, ALT 636 FOR OP/ED): Performed by: NURSE PRACTITIONER

## 2023-12-14 PROCEDURE — 36415 COLL VENOUS BLD VENIPUNCTURE: CPT | Performed by: STUDENT IN AN ORGANIZED HEALTH CARE EDUCATION/TRAINING PROGRAM

## 2023-12-14 PROCEDURE — 2500000001 HC RX 250 WO HCPCS SELF ADMINISTERED DRUGS (ALT 637 FOR MEDICARE OP): Performed by: NURSE PRACTITIONER

## 2023-12-14 PROCEDURE — 85025 COMPLETE CBC W/AUTO DIFF WBC: CPT | Performed by: STUDENT IN AN ORGANIZED HEALTH CARE EDUCATION/TRAINING PROGRAM

## 2023-12-14 PROCEDURE — 85610 PROTHROMBIN TIME: CPT | Performed by: STUDENT IN AN ORGANIZED HEALTH CARE EDUCATION/TRAINING PROGRAM

## 2023-12-14 PROCEDURE — 2500000004 HC RX 250 GENERAL PHARMACY W/ HCPCS (ALT 636 FOR OP/ED): Performed by: NURSE PRACTITIONER

## 2023-12-14 PROCEDURE — 80053 COMPREHEN METABOLIC PANEL: CPT | Performed by: STUDENT IN AN ORGANIZED HEALTH CARE EDUCATION/TRAINING PROGRAM

## 2023-12-14 PROCEDURE — 2500000004 HC RX 250 GENERAL PHARMACY W/ HCPCS (ALT 636 FOR OP/ED): Performed by: INTERNAL MEDICINE

## 2023-12-14 RX ORDER — ACETAMINOPHEN 500 MG
1000 TABLET ORAL 2 TIMES DAILY
Start: 2023-12-14

## 2023-12-14 RX ORDER — POLYETHYLENE GLYCOL 3350 17 G/17G
17 POWDER, FOR SOLUTION ORAL DAILY
Start: 2023-12-14

## 2023-12-14 RX ORDER — ATORVASTATIN CALCIUM 40 MG/1
40 TABLET, FILM COATED ORAL NIGHTLY
Start: 2023-12-14

## 2023-12-14 RX ORDER — ASPIRIN 81 MG/1
81 TABLET ORAL DAILY
Start: 2023-12-14

## 2023-12-14 RX ORDER — CIPROFLOXACIN 500 MG/1
500 TABLET ORAL 2 TIMES DAILY
Qty: 14 TABLET | Refills: 0 | Status: SHIPPED | OUTPATIENT
Start: 2023-12-14 | End: 2023-12-21

## 2023-12-14 RX ORDER — WARFARIN SODIUM 5 MG/1
TABLET ORAL
Start: 2023-12-14 | End: 2024-12-13

## 2023-12-14 RX ADMIN — IPRATROPIUM BROMIDE AND ALBUTEROL SULFATE 3 ML: 2.5; .5 SOLUTION RESPIRATORY (INHALATION) at 08:41

## 2023-12-14 RX ADMIN — PIPERACILLIN SODIUM AND TAZOBACTAM SODIUM 2.25 G: 2; .25 INJECTION, SOLUTION INTRAVENOUS at 00:31

## 2023-12-14 RX ADMIN — ASPIRIN 81 MG: 81 TABLET, COATED ORAL at 09:43

## 2023-12-14 RX ADMIN — BUPROPION HYDROCHLORIDE 150 MG: 150 TABLET, FILM COATED, EXTENDED RELEASE ORAL at 09:43

## 2023-12-14 RX ADMIN — ACETAMINOPHEN 975 MG: 325 TABLET ORAL at 09:43

## 2023-12-14 RX ADMIN — GABAPENTIN 100 MG: 100 CAPSULE ORAL at 09:43

## 2023-12-14 RX ADMIN — POLYETHYLENE GLYCOL 3350 17 G: 17 POWDER, FOR SOLUTION ORAL at 09:43

## 2023-12-14 RX ADMIN — PIPERACILLIN SODIUM AND TAZOBACTAM SODIUM 2.25 G: 2; .25 INJECTION, SOLUTION INTRAVENOUS at 06:27

## 2023-12-14 RX ADMIN — CITALOPRAM HYDROBROMIDE 40 MG: 20 TABLET ORAL at 09:43

## 2023-12-14 ASSESSMENT — COGNITIVE AND FUNCTIONAL STATUS - GENERAL
WALKING IN HOSPITAL ROOM: A LOT
MOVING TO AND FROM BED TO CHAIR: A LOT
MOBILITY SCORE: 11
DRESSING REGULAR UPPER BODY CLOTHING: A LOT
HELP NEEDED FOR BATHING: A LOT
CLIMB 3 TO 5 STEPS WITH RAILING: TOTAL
TOILETING: A LOT
STANDING UP FROM CHAIR USING ARMS: A LOT
DRESSING REGULAR LOWER BODY CLOTHING: A LOT
PERSONAL GROOMING: A LITTLE
DAILY ACTIVITIY SCORE: 15
TURNING FROM BACK TO SIDE WHILE IN FLAT BAD: A LOT
MOVING FROM LYING ON BACK TO SITTING ON SIDE OF FLAT BED WITH BEDRAILS: A LOT

## 2023-12-14 ASSESSMENT — PAIN SCALES - GENERAL: PAINLEVEL_OUTOF10: 0 - NO PAIN

## 2023-12-14 NOTE — PROGRESS NOTES
Melecio Whaley is a 88 y.o. male on day 7 of admission presenting with DENA (acute kidney injury) (CMS/HCC).    Subjective   Interval History: no fever, no new complaints         Review of Systems    Objective   Range of Vitals (last 24 hours)  Heart Rate:  [70-79]   Temp:  [36.1 °C (97 °F)-36.2 °C (97.2 °F)]   Resp:  [17]   BP: (111-124)/(63-76)   SpO2:  [93 %-97 %]   Daily Weight  12/13/23 : 122 kg (268 lb 4.8 oz)    Body mass index is 36.38 kg/m².    Physical Exam  Constitutional:       Appearance: Normal appearance.   HENT:      Head: Normocephalic and atraumatic.      Mouth/Throat:      Mouth: Mucous membranes are moist.      Pharynx: Oropharynx is clear.   Eyes:      Pupils: Pupils are equal, round, and reactive to light.   Cardiovascular:      Rate and Rhythm: Normal rate and regular rhythm.      Heart sounds: Normal heart sounds.   Pulmonary:      Effort: Pulmonary effort is normal.      Breath sounds: Normal breath sounds.   Abdominal:      General: Abdomen is flat. Bowel sounds are normal.      Palpations: Abdomen is soft.   Musculoskeletal:      Cervical back: Normal range of motion.   Neurological:      Mental Status: He is alert.         Antibiotics  sodium chloride 0.9 % bolus 500 mL  buPROPion (Wellbutrin SR) 12 hr tablet  dilTIAZem (Dilacor XR) 24 hr capsule  dilTIAZem (Dilacor XR) 24 hr capsule  sodium chloride 0.9% infusion  enoxaparin (Lovenox) syringe 40 mg  sodium chloride 0.9% infusion  acetaminophen (Tylenol) tablet 650 mg  melatonin tablet 3 mg  polyethylene glycol (Glycolax, Miralax) packet 17 g  ondansetron (Zofran) tablet 4 mg  ondansetron (Zofran) injection 4 mg  citalopram (CeleXA) tablet 40 mg  gabapentin (Neurontin) capsule 300 mg  simvastatin (Zocor) tablet 20 mg  warfarin (Coumadin) tablet 2.5 mg  acetaminophen (Tylenol) tablet 975 mg  cefTRIAXone (Rocephin) IVPB 1 g  warfarin (Coumadin) tablet 5 mg  dofetilide (Tikosyn) capsule 250 mcg  perflutren lipid microspheres (Definity)  injection 0.5-10 mL of dilution  sulfur hexafluoride microsphr (Lumason) injection 24.28 mg  perflutren protein A microsphere (Optison) injection 0.5 mL  buPROPion SR (Wellbutrin SR) 12 hr tablet 150 mg  ALPRAZolam (Xanax) tablet 1 mg  dilTIAZem XR (Dilacor XR) 24 hr capsule 240 mg  furosemide (Lasix) tablet 40 mg  oxybutynin (Ditropan) tablet 5 mg  gabapentin (Neurontin) capsule 100 mg  buPROPion XL (Wellbutrin XL) 24 hr tablet 150 mg  atorvastatin (Lipitor) tablet 40 mg  aspirin EC tablet 81 mg  zinc oxide 40 % ointment  furosemide (Lasix) injection 40 mg  ipratropium-albuteroL (Duo-Neb) 0.5-2.5 mg/3 mL nebulizer solution 3 mL  ipratropium-albuteroL (Duo-Neb) 0.5-2.5 mg/3 mL nebulizer solution 3 mL  albuterol 2.5 mg /3 mL (0.083 %) nebulizer solution 2.5 mg  cefTRIAXone (Rocephin) IVPB 1 g  piperacillin-tazobactam-dextrose (Zosyn) IV 2.25 g  furosemide (Lasix) tablet 40 mg  warfarin (Coumadin) tablet 2.5 mg      Relevant Results  Labs  Results from last 72 hours   Lab Units 12/14/23 0627 12/13/23 0707 12/12/23 0631   WBC AUTO x10*3/uL 11.6* 11.5* 10.8   HEMOGLOBIN g/dL 12.3* 12.5* 11.3*   HEMATOCRIT % 37.9* 39.3* 35.6*   PLATELETS AUTO x10*3/uL 252 226 200   NEUTROS PCT AUTO % 76.9 76.9  --    LYMPHS PCT AUTO % 6.7 7.0  --    MONOS PCT AUTO % 7.2 7.3  --    EOS PCT AUTO % 8.0 7.7  --        Results from last 72 hours   Lab Units 12/14/23 0627 12/13/23 0707 12/12/23 0631   SODIUM mmol/L 139 139 142   POTASSIUM mmol/L 3.8 3.6 3.5   CHLORIDE mmol/L 97* 98 100   CO2 mmol/L 33* 32 34*   BUN mg/dL 28* 25* 27*   CREATININE mg/dL 1.49* 1.22 1.49*   GLUCOSE mg/dL 82 85 85   CALCIUM mg/dL 8.3* 8.1* 7.9*   ANION GAP mmol/L 13 13 12   EGFR mL/min/1.73m*2 45* 58* 45*       Results from last 72 hours   Lab Units 12/14/23  0627 12/13/23  0707   ALK PHOS U/L 41 39   BILIRUBIN TOTAL mg/dL 0.5 0.6   PROTEIN TOTAL g/dL 6.0* 6.0*   ALT U/L 22 14   AST U/L 23 15   ALBUMIN g/dL 3.1* 3.1*       Estimated Creatinine Clearance: 48  "mL/min (A) (by C-G formula based on SCr of 1.49 mg/dL (H)).  No results found for: \"CRP\"  Microbiology  Susceptibility data from last 14 days.  Collected Specimen Info Organism Amoxicillin/Clavulanate Ampicillin Ampicillin/Sulbactam Cefazolin Ceftriaxone Ciprofloxacin Gentamicin Imipenem Piperacillin/Tazobactam Trimethoprim/Sulfamethoxazole   12/07/23 Urine from Clean Catch/Voided Morganella morganii R R I R S S S I S S       Imaging  reviewed        Assessment/Plan   UTI, the urine with Morganella, needed a Craft for retention  Encephalopathy, likely metabolic  Leukocytosis, resolved     Recommendations :  Plan on oral Cipro with discharge  Discussed with the medical team     I spent minutes in the professional and overall care of this patient.      Renzo Orozco MD  "

## 2023-12-14 NOTE — CARE PLAN
The patient's goals for the shift include remain comfortable    The clinical goals for the shift include patient will remain comfortable throughout shift

## 2023-12-14 NOTE — CARE PLAN
The patient's goals for the shift include      The clinical goals for the shift include patient will remain comfortable throughout shift

## 2023-12-14 NOTE — DISCHARGE SUMMARY
Discharge Diagnosis  DENA (acute kidney injury) (CMS/[facility])    Issues Requiring Follow-Up  INR follow up  Urinary retention    Discharge Meds     Your medication list        START taking these medications        Instructions Last Dose Given Next Dose Due   aspirin 81 mg EC tablet      Take 1 tablet (81 mg) by mouth once daily.       atorvastatin 40 mg tablet  Commonly known as: Lipitor      Take 1 tablet (40 mg) by mouth once daily at bedtime.       ciprofloxacin 500 mg tablet  Commonly known as: Cipro      Take 1 tablet (500 mg) by mouth 2 times a day for 7 days.       polyethylene glycol 17 gram packet  Commonly known as: Glycolax, Miralax      Take 17 g by mouth once daily.              CHANGE how you take these medications        Instructions Last Dose Given Next Dose Due   warfarin 5 mg tablet  Commonly known as: Coumadin  What changed:   medication strength  additional instructions      Take as directed per After Visit Summary.              CONTINUE taking these medications        Instructions Last Dose Given Next Dose Due   acetaminophen 500 mg tablet  Commonly known as: Tylenol      Take 2 tablets (1,000 mg) by mouth 2 times a day.       citalopram 40 mg tablet  Commonly known as: CeleXA      Take 1 tablet (40 mg) by mouth once daily.       cyanocobalamin 500 mcg tablet  Commonly known as: Vitamin B-12           furosemide 40 mg tablet  Commonly known as: Lasix           gabapentin 300 mg capsule  Commonly known as: Neurontin      Take 1 capsule (300 mg) by mouth 2 times a day.              STOP taking these medications      buPROPion  mg 24 hr tablet  Commonly known as: Wellbutrin XL        oxybutynin 5 mg tablet  Commonly known as: Ditropan        simvastatin 20 mg tablet  Commonly known as: Zocor                  Where to Get Your Medications        These medications were sent to [facility]  [address], [facility] [address]      Hours: 9 AM to 5 PM Mon-Fri Phone: [phone]    ciprofloxacin 500 mg tablet       Information about where to get these medications is not yet available    Ask your nurse or doctor about these medications  acetaminophen 500 mg tablet  aspirin 81 mg EC tablet  atorvastatin 40 mg tablet  polyethylene glycol 17 gram packet  warfarin 5 mg tablet         Test Results Pending At Discharge  Pending Labs       Order Current Status    Blood Culture Preliminary result            Hospital Course   Melecio Whaley is a 87 y.o. male (full code from [place]) with multiple comorbidities (see hx) presenting with multiple complaints including new waxing and waning slurred speech with a very dry mouth, urinary incontinence at baseline with malodorous urine, increased dyspnea with wheezing and leg edema, recent fall out of bed a day ago while on coumadin. He is treated for  Acute metabolic encephalopathy 2/2 infectious process with UTI and dehydration  Concern for underlying dementia  Likely new baseline  -unable to get MRI as pt cannot lie flat  -Neurology says that stroke is unlikely, recommends PT  - ID following  - zosyn, change to cipro upon dc     Gross hematuria  Urine retention  Complicated UTI, UC morganella morganii  - Urology following  - failed voiding trial, need daniel  - dc ditropan  - Hg stable  - zosyn, change to cipro upon dc  - ID following     Diastolic HF exacerbation  - cardio following  - statin  - ASA  - lasix     A fib with subtherapeutic INR  A flutter with slow response  -Cardiology recommends holding Cardizem and Tikosyn   - coumadin 10mg daily  - daily INR     DENA  Cr stabilized  - can continue furosemide per home schedule  - Monitor renal function     Neuropathy  - gabapentin     Mechanical fall   - trauma recs appreciated     Sacral wound  -Does not appear infected  -Wound care      Depression  - wellbutrin   - celexa    Pt is hemodynamically stable for discharge at this time with close outpatient follow ups.     The patient was discharged in satisfactory  condition.   Medications and side effect profile reviewed with patient.  More than 30 minutes were spent in coordinating patient discharge     Pertinent Physical Exam At Time of Discharge  Physical Exam  Vitals and nursing note reviewed.   Constitutional:       General: He is not in acute distress.     Appearance: Normal appearance. He is not ill-appearing or toxic-appearing.   HENT:      Head: Normocephalic and atraumatic.      Mouth/Throat:      Mouth: Mucous membranes are moist.   Eyes:      Extraocular Movements: Extraocular movements intact.      Conjunctiva/sclera: Conjunctivae normal.      Pupils: Pupils are equal, round, and reactive to light.   Cardiovascular:      Rate and Rhythm: Normal rate and regular rhythm.      Heart sounds: No murmur heard.     No gallop.   Pulmonary:      Effort: Pulmonary effort is normal. No respiratory distress.      Breath sounds: Normal breath sounds. No wheezing, rhonchi or rales.   Abdominal:      General: Abdomen is flat. Bowel sounds are normal. There is no distension.      Palpations: Abdomen is soft. There is no mass.      Tenderness: There is no abdominal tenderness.   Musculoskeletal:         General: Swelling present. No tenderness. Normal range of motion.      Cervical back: Normal range of motion and neck supple.   Skin:     General: Skin is warm and dry.   Neurological:      Mental Status: He is alert and oriented to person, place, and time.      Motor: Weakness present.   Psychiatric:         Mood and Affect: Mood normal.         Behavior: Behavior normal.         Thought Content: Thought content normal.         Judgment: Judgment normal.     Outpatient Follow-Up  Future Appointments   Date Time Provider Department Center   1/8/2024  1:20 PM [address]   1/16/2024 11:30 AM [facility]         Zaria Ly MD  Hospitalist

## 2023-12-15 ENCOUNTER — PATIENT OUTREACH (OUTPATIENT)
Dept: CARE COORDINATION | Facility: CLINIC | Age: 88
End: 2023-12-15
Payer: MEDICARE

## 2023-12-15 ENCOUNTER — DOCUMENTATION (OUTPATIENT)
Dept: PRIMARY CARE | Facility: CLINIC | Age: 88
End: 2023-12-15
Payer: MEDICARE

## 2023-12-15 LAB — BACTERIA BLD CULT: NORMAL

## 2024-03-22 ENCOUNTER — HOSPITAL ENCOUNTER (OUTPATIENT)
Dept: RADIOLOGY | Facility: HOSPITAL | Age: 89
Discharge: HOME | End: 2024-03-22
Payer: MEDICARE

## 2024-03-22 ENCOUNTER — HOSPITAL ENCOUNTER (OUTPATIENT)
Dept: VASCULAR MEDICINE | Facility: HOSPITAL | Age: 89
Discharge: HOME | End: 2024-03-22
Payer: MEDICARE

## 2024-03-22 DIAGNOSIS — R22.42 MASS OF LEFT LOWER LEG: ICD-10-CM

## 2024-03-22 DIAGNOSIS — I82.409 DVT (DEEP VENOUS THROMBOSIS) (MULTI): ICD-10-CM

## 2024-03-22 PROCEDURE — 76881 US COMPL JOINT R-T W/IMG: CPT | Mod: 59

## 2024-03-22 PROCEDURE — 93971 EXTREMITY STUDY: CPT

## 2024-03-22 PROCEDURE — 93971 EXTREMITY STUDY: CPT | Performed by: INTERNAL MEDICINE

## 2024-03-22 PROCEDURE — 76882 US LMTD JT/FCL EVL NVASC XTR: CPT | Performed by: RADIOLOGY

## 2024-06-02 ENCOUNTER — HOSPITAL ENCOUNTER (EMERGENCY)
Facility: HOSPITAL | Age: 89
Discharge: HOME | End: 2024-06-02
Attending: STUDENT IN AN ORGANIZED HEALTH CARE EDUCATION/TRAINING PROGRAM
Payer: MEDICARE

## 2024-06-02 ENCOUNTER — APPOINTMENT (OUTPATIENT)
Dept: RADIOLOGY | Facility: HOSPITAL | Age: 89
End: 2024-06-02
Payer: MEDICARE

## 2024-06-02 VITALS
WEIGHT: 270 LBS | HEIGHT: 72 IN | HEART RATE: 98 BPM | DIASTOLIC BLOOD PRESSURE: 65 MMHG | TEMPERATURE: 96.8 F | RESPIRATION RATE: 18 BRPM | OXYGEN SATURATION: 98 % | BODY MASS INDEX: 36.57 KG/M2 | SYSTOLIC BLOOD PRESSURE: 136 MMHG

## 2024-06-02 DIAGNOSIS — Z79.01 ANTICOAGULATED: ICD-10-CM

## 2024-06-02 DIAGNOSIS — S91.011A LACERATION OF RIGHT ANKLE, INITIAL ENCOUNTER: Primary | ICD-10-CM

## 2024-06-02 PROCEDURE — 12002 RPR S/N/AX/GEN/TRNK2.6-7.5CM: CPT

## 2024-06-02 PROCEDURE — 73630 X-RAY EXAM OF FOOT: CPT | Mod: RIGHT SIDE | Performed by: RADIOLOGY

## 2024-06-02 PROCEDURE — 73630 X-RAY EXAM OF FOOT: CPT | Mod: RT

## 2024-06-02 PROCEDURE — 99283 EMERGENCY DEPT VISIT LOW MDM: CPT

## 2024-06-02 RX ORDER — LIDOCAINE HYDROCHLORIDE AND EPINEPHRINE 10; 10 MG/ML; UG/ML
INJECTION, SOLUTION INFILTRATION; PERINEURAL
Status: DISCONTINUED
Start: 2024-06-02 | End: 2024-06-02 | Stop reason: HOSPADM

## 2024-06-02 RX ORDER — LIDOCAINE HYDROCHLORIDE AND EPINEPHRINE 10; 10 MG/ML; UG/ML
10 INJECTION, SOLUTION INFILTRATION; PERINEURAL ONCE
Status: DISCONTINUED | OUTPATIENT
Start: 2024-06-02 | End: 2024-06-02 | Stop reason: HOSPADM

## 2024-06-02 ASSESSMENT — PAIN SCALES - GENERAL
PAINLEVEL_OUTOF10: 2
PAINLEVEL_OUTOF10: 2

## 2024-06-02 ASSESSMENT — LIFESTYLE VARIABLES
TOTAL SCORE: 0
EVER HAD A DRINK FIRST THING IN THE MORNING TO STEADY YOUR NERVES TO GET RID OF A HANGOVER: NO
EVER FELT BAD OR GUILTY ABOUT YOUR DRINKING: NO
HAVE PEOPLE ANNOYED YOU BY CRITICIZING YOUR DRINKING: NO
HAVE YOU EVER FELT YOU SHOULD CUT DOWN ON YOUR DRINKING: NO

## 2024-06-02 ASSESSMENT — COLUMBIA-SUICIDE SEVERITY RATING SCALE - C-SSRS
6. HAVE YOU EVER DONE ANYTHING, STARTED TO DO ANYTHING, OR PREPARED TO DO ANYTHING TO END YOUR LIFE?: NO
2. HAVE YOU ACTUALLY HAD ANY THOUGHTS OF KILLING YOURSELF?: NO
1. IN THE PAST MONTH, HAVE YOU WISHED YOU WERE DEAD OR WISHED YOU COULD GO TO SLEEP AND NOT WAKE UP?: NO

## 2024-06-02 ASSESSMENT — PAIN DESCRIPTION - ORIENTATION: ORIENTATION: RIGHT

## 2024-06-02 ASSESSMENT — PAIN - FUNCTIONAL ASSESSMENT: PAIN_FUNCTIONAL_ASSESSMENT: 0-10

## 2024-06-02 ASSESSMENT — PAIN DESCRIPTION - LOCATION: LOCATION: ANKLE

## 2024-06-02 NOTE — ED PROVIDER NOTES
HPI   Chief Complaint   Patient presents with    Ankle Pain     Pt hit outer part of rt ankle on the bed and cut his ankle. Nursing home was unable to control bleeding. Pt is on coumadin.         Patient is an 88-year-old male presenting for right ankle bleeding.  The patient states that he scraped his right ankle on his bedpost and it continued to bleed.  He is on warfarin they tried direct pressure but it was unable to be controlled at this time.  Denies any worsening pain or paresthesias.                          Isidro Coma Scale Score: 15                     Patient History   Past Medical History:   Diagnosis Date    CHF (congestive heart failure) (Multi)     Depression     Heart disease     Personal history of other diseases of the nervous system and sense organs     History of neuropathy     Past Surgical History:   Procedure Laterality Date    OTHER SURGICAL HISTORY  06/16/2021    Cataract surgery    OTHER SURGICAL HISTORY  06/16/2021    Back surgery    OTHER SURGICAL HISTORY  06/16/2021    Prostate biopsy    OTHER SURGICAL HISTORY  06/16/2021    Knee surgery    OTHER SURGICAL HISTORY  06/16/2021    Skin tag removal     Family History   Problem Relation Name Age of Onset    Coronary artery disease Neg Hx      Stroke Neg Hx       Social History     Tobacco Use    Smoking status: Never    Smokeless tobacco: Never   Vaping Use    Vaping status: Never Used    Passive vaping exposure: Yes   Substance Use Topics    Alcohol use: Never    Drug use: Never       Physical Exam   ED Triage Vitals [06/02/24 0820]   Temperature Heart Rate Respirations BP   36 °C (96.8 °F) 98 18 136/65      Pulse Ox Temp src Heart Rate Source Patient Position   98 % -- -- --      BP Location FiO2 (%)     -- --       Physical Exam  Musculoskeletal:      Comments: Tenderness to palpation over the distal dorsum of the right foot.  Normal range of motion of the ankle, laceration does not overlie the joint space of the ankle.   Skin:      Comments: 3.5 cm V shaped laceration to the lateral aspect of the right lower extremity.         ED Course & MDM   Diagnoses as of 06/02/24 0915   Laceration of right ankle, initial encounter   Anticoagulated       Medical Decision Making  Patient is an 88-year-old male presenting for right lower extremity bleeding.  History physical examination most concerning for traumatic injury and laceration.  There is full range of motion with no bony tenderness to the ankle some less concerned about acute fracture and do not think it warrants x-rays however the patient does have acute tenderness over the distal first metatarsal area on the dorsum side and so x-rays were obtained of the foot.  Laceration was repaired please see procedure note for complete details.  This does not involve joint space based on approximation and location and so the patient was discharged back to facility after hemostasis was achieved postrepair.    X-rays were independently interpreted negative for acute fracture.  Patient was discharged after hemostasis was achieved of the laceration.        Procedure  Laceration Repair    Performed by: Jono Mcneil MD  Authorized by: Jono Mcneil MD    Consent:     Consent obtained:  Verbal    Consent given by:  Patient    Risks discussed:  Infection, pain, poor cosmetic result, poor wound healing, need for additional repair and vascular damage    Alternatives discussed:  No treatment and delayed treatment  Universal protocol:     Patient identity confirmed:  Verbally with patient  Anesthesia:     Anesthesia method:  Local infiltration    Local anesthetic:  Lidocaine 1% WITH epi (5 mL)  Laceration details:     Location:  Leg    Leg location:  R lower leg    Length (cm):  3.5    Depth (mm):  4  Pre-procedure details:     Preparation:  Patient was prepped and draped in usual sterile fashion  Exploration:     Hemostasis achieved with:  Epinephrine and direct pressure    Imaging outcome: foreign body not  noted      Wound exploration: wound explored through full range of motion and entire depth of wound visualized      Contaminated: no    Treatment:     Area cleansed with:  Saline    Amount of cleaning:  Standard    Irrigation solution:  Sterile saline    Irrigation method:  Syringe  Skin repair:     Repair method:  Sutures    Suture size:  4-0    Suture material:  Nylon    Suture technique:  Simple interrupted    Number of sutures:  7  Approximation:     Approximation:  Close       Jono Mcneil MD  06/02/24 0915

## 2024-06-02 NOTE — ED TRIAGE NOTES
Pt hit outer part of rt ankle on the bed and cut his ankle. Nursing home was unable to control bleeding. Pt is on coumadin.

## 2024-06-02 NOTE — DISCHARGE INSTRUCTIONS
Please have a provider remove your stitches in 7 to 10 days when the wound is healed.  You may return back to the emerged part for removal if there is no other available provider.

## 2024-06-25 ENCOUNTER — OFFICE VISIT (OUTPATIENT)
Dept: WOUND CARE | Facility: HOSPITAL | Age: 89
End: 2024-06-25
Payer: MEDICARE

## 2024-06-25 ENCOUNTER — TELEPHONE (OUTPATIENT)
Dept: CARDIOLOGY | Facility: HOSPITAL | Age: 89
End: 2024-06-25

## 2024-06-25 DIAGNOSIS — I73.9 PERIPHERAL VASCULAR DISEASE (CMS-HCC): Primary | ICD-10-CM

## 2024-06-25 PROCEDURE — 99213 OFFICE O/P EST LOW 20 MIN: CPT | Mod: 25

## 2024-06-25 PROCEDURE — 11042 DBRDMT SUBQ TIS 1ST 20SQCM/<: CPT

## 2024-07-03 ENCOUNTER — OFFICE VISIT (OUTPATIENT)
Dept: WOUND CARE | Facility: HOSPITAL | Age: 89
End: 2024-07-03
Payer: MEDICARE

## 2024-07-03 PROCEDURE — 11042 DBRDMT SUBQ TIS 1ST 20SQCM/<: CPT

## 2024-07-03 PROCEDURE — 29581 APPL MULTLAYER CMPRN SYS LEG: CPT

## 2024-07-08 ENCOUNTER — OFFICE VISIT (OUTPATIENT)
Dept: CARDIOLOGY | Facility: HOSPITAL | Age: 89
End: 2024-07-08
Payer: MEDICARE

## 2024-07-08 ENCOUNTER — LAB (OUTPATIENT)
Dept: LAB | Facility: LAB | Age: 89
End: 2024-07-08
Payer: MEDICARE

## 2024-07-08 VITALS
OXYGEN SATURATION: 97 % | WEIGHT: 315 LBS | SYSTOLIC BLOOD PRESSURE: 157 MMHG | HEART RATE: 65 BPM | DIASTOLIC BLOOD PRESSURE: 91 MMHG | BODY MASS INDEX: 43.98 KG/M2

## 2024-07-08 DIAGNOSIS — R33.9 URINARY RETENTION: ICD-10-CM

## 2024-07-08 DIAGNOSIS — I50.9 CONGESTIVE HEART FAILURE, UNSPECIFIED HF CHRONICITY, UNSPECIFIED HEART FAILURE TYPE (MULTI): Primary | ICD-10-CM

## 2024-07-08 DIAGNOSIS — I50.9 CONGESTIVE HEART FAILURE, UNSPECIFIED HF CHRONICITY, UNSPECIFIED HEART FAILURE TYPE (MULTI): ICD-10-CM

## 2024-07-08 DIAGNOSIS — I10 HYPERTENSION, UNSPECIFIED TYPE: ICD-10-CM

## 2024-07-08 LAB
ANION GAP SERPL CALC-SCNC: 13 MMOL/L (ref 10–20)
BNP SERPL-MCNC: 339 PG/ML (ref 0–99)
BUN SERPL-MCNC: 29 MG/DL (ref 6–23)
CALCIUM SERPL-MCNC: 8.2 MG/DL (ref 8.6–10.3)
CHLORIDE SERPL-SCNC: 102 MMOL/L (ref 98–107)
CO2 SERPL-SCNC: 31 MMOL/L (ref 21–32)
CREAT SERPL-MCNC: 1.17 MG/DL (ref 0.5–1.3)
EGFRCR SERPLBLD CKD-EPI 2021: 61 ML/MIN/1.73M*2
GLUCOSE SERPL-MCNC: 89 MG/DL (ref 74–99)
POTASSIUM SERPL-SCNC: 5.1 MMOL/L (ref 3.5–5.3)
SODIUM SERPL-SCNC: 141 MMOL/L (ref 136–145)

## 2024-07-08 PROCEDURE — 83880 ASSAY OF NATRIURETIC PEPTIDE: CPT

## 2024-07-08 PROCEDURE — 36415 COLL VENOUS BLD VENIPUNCTURE: CPT

## 2024-07-08 PROCEDURE — 99214 OFFICE O/P EST MOD 30 MIN: CPT | Performed by: NURSE PRACTITIONER

## 2024-07-08 PROCEDURE — 3080F DIAST BP >= 90 MM HG: CPT | Performed by: NURSE PRACTITIONER

## 2024-07-08 PROCEDURE — 1036F TOBACCO NON-USER: CPT | Performed by: NURSE PRACTITIONER

## 2024-07-08 PROCEDURE — 3077F SYST BP >= 140 MM HG: CPT | Performed by: NURSE PRACTITIONER

## 2024-07-08 PROCEDURE — 80048 BASIC METABOLIC PNL TOTAL CA: CPT

## 2024-07-08 PROCEDURE — 1159F MED LIST DOCD IN RCRD: CPT | Performed by: NURSE PRACTITIONER

## 2024-07-08 ASSESSMENT — ENCOUNTER SYMPTOMS
GASTROINTESTINAL NEGATIVE: 1
DYSPNEA ON EXERTION: 1
MYALGIAS: 1
HEMATOLOGIC/LYMPHATIC NEGATIVE: 1
EYES NEGATIVE: 1
PSYCHIATRIC NEGATIVE: 1
NEUROLOGICAL NEGATIVE: 1
SHORTNESS OF BREATH: 1
ENDOCRINE NEGATIVE: 1

## 2024-07-08 NOTE — PATIENT INSTRUCTIONS
INCREASE LASIX TO 40 MG TWICE A DAY FOR FIVE  DAYS AND THEN LASIX 40 MG DAILY   CALL WITH ANY QUESTIONS   DAILY WEIGHTS   MONITOR SODIUM AND FLUID INTAKE   FOLLOW UP ON WEDNESDAY  BLOOD WORK TODAY

## 2024-07-08 NOTE — PROGRESS NOTES
Referred by Dr. Serna ref. provider found for No chief complaint on file.     History Of Present Illness:    Melecio Whaley is a very pleasant 88 year old gentleman with a history of atrial fibrillation s/p ablation and HFpEF, he is here for a follow up visit. The patient is seen in collaboration with Dr. Jose. He is here today with his son. Son states he has been using his wife Oxygen. States he is short of breath with any activity. Pulse ox has been stable. Denies chest pain or heart palpitations. He cut his leg going to wound care. Currently has legs are wrapped. Monitoring sodium intake.     Review of Systems   Constitutional: Positive for malaise/fatigue.   HENT: Negative.     Eyes: Negative.    Cardiovascular:  Positive for dyspnea on exertion and leg swelling.   Respiratory:  Positive for shortness of breath.    Endocrine: Negative.    Hematologic/Lymphatic: Negative.    Skin: Negative.    Musculoskeletal:  Positive for muscle weakness and myalgias.   Gastrointestinal: Negative.    Neurological: Negative.    Psychiatric/Behavioral: Negative.          Past Medical History:  He has a past medical history of CHF (congestive heart failure) (Multi), Depression, Heart disease, and Personal history of other diseases of the nervous system and sense organs.    Past Surgical History:  He has a past surgical history that includes Other surgical history (06/16/2021); Other surgical history (06/16/2021); Other surgical history (06/16/2021); Other surgical history (06/16/2021); and Other surgical history (06/16/2021).      Social History:  He reports that he has never smoked. He has never used smokeless tobacco. He reports that he does not drink alcohol and does not use drugs.    Family History:  Family History   Problem Relation Name Age of Onset    Coronary artery disease Neg Hx      Stroke Neg Hx          Allergies:  Latex    Outpatient Medications:  Current Outpatient Medications   Medication Instructions     acetaminophen (TYLENOL) 1,000 mg, oral, 2 times daily    aspirin 81 mg, oral, Daily    atorvastatin (LIPITOR) 40 mg, oral, Nightly    citalopram (CELEXA) 40 mg, oral, Daily    cyanocobalamin (VITAMIN B-12) 1,000 mcg, oral, Daily    furosemide (LASIX) 40 mg, oral, Every Monday, Wednesday & Friday    gabapentin (NEURONTIN) 300 mg, oral, 2 times daily    polyethylene glycol (GLYCOLAX, MIRALAX) 17 g, oral, Daily    warfarin (Coumadin) 5 mg tablet Take as directed per After Visit Summary.        Last Recorded Vitals:  There were no vitals filed for this visit.    Physical Exam:  Physical Exam  Vitals reviewed.   HENT:      Head: Normocephalic.      Nose: Nose normal.   Eyes:      Pupils: Pupils are equal, round, and reactive to light.   Cardiovascular:      Rate and Rhythm: Normal rate and regular rhythm.   Pulmonary:      Effort: Pulmonary effort is normal.      Breath sounds: Normal breath sounds.   Abdominal:      General: Abdomen is flat.      Palpations: Abdomen is soft.   Musculoskeletal:         General: Normal range of motion.      Cervical back: Normal range of motion.   Skin:     General: Skin is warm and dry.   Neurological:      General: No focal deficit present.      Mental Status: He is alert and oriented to person, place, and time.   Psychiatric:         Mood and Affect: Mood normal.       Extremities +1-+2 pedal edema bilaterally      Last Labs:  CBC -  Lab Results   Component Value Date    WBC 10.3 12/22/2023    HGB 12.9 (L) 12/22/2023    HCT 41.3 12/22/2023    MCV 94 12/22/2023     12/22/2023       CMP -  Lab Results   Component Value Date    CALCIUM 8.5 (L) 12/22/2023    PROT 5.6 (L) 12/15/2023    ALBUMIN 3.0 (L) 12/15/2023    AST 26 12/15/2023    ALT 27 12/15/2023    ALKPHOS 44 12/15/2023    BILITOT 0.5 12/15/2023       LIPID PANEL -   Lab Results   Component Value Date    CHOL 104 12/08/2023    TRIG 63 12/08/2023    HDL 36.6 12/08/2023    CHHDL 2.8 12/08/2023    LDLF 82 05/04/2023    VLDL 13  12/08/2023    NHDL 67 12/08/2023       RENAL FUNCTION PANEL -   Lab Results   Component Value Date    GLUCOSE 74 12/22/2023     12/22/2023    K 4.7 12/22/2023     12/22/2023    CO2 25 12/22/2023    ANIONGAP 15 12/22/2023    BUN 31 (H) 12/22/2023    CREATININE 1.06 12/22/2023    GFRMALE 48 (A) 05/04/2023    CALCIUM 8.5 (L) 12/22/2023    ALBUMIN 3.0 (L) 12/15/2023        Lab Results   Component Value Date     (H) 12/11/2023    HGBA1C 5.3 08/11/2022    HGBA1C 5.5 07/23/2020       Last Cardiology Tests:  ECG:    Echo:  Transthoracic Echo (TTE) Complete 12/08/2023  1. Left ventricular systolic function is normal.   2. Moderately increased left ventricular septal thickness.   3. Moderate aortic valve stenosis.   4. Mildly elevated RVSP.    LUCERO: 7/12/2021  1. The left ventricular systolic function is normal with a 55-60% estimated  ejection fraction.  2. There is mild aortic valve regurgitation.  3. There is mild mitral and tricuspid regurgitation.  4. No left atrial thrombus.     ECHO: 7/9/2021  1. The left ventricular systolic function is normal with a 60-65% estimated  ejection fraction.  2. Spectral Doppler shows an abnormal pattern of left ventricular diastolic  filling.  3. There is moderate concentric left ventricular hypertrophy.  4. The patient is in atrial fibrillation which may influence the estimate of  left ventricular function and transvalvular flows.  5. There is mild mitral and tricuspid regurgitation.  6. The estimated pulmonary artery pressure is mildly elevated with the RVSP at  43.2 mmHg.   Ejection Fractions:  EF   Date/Time Value Ref Range Status   12/08/2023 11:10 AM 60         Cath:    Stress Test:    Cardiac Imaging:      Assessment/Plan   JEAN CLAUDE ALLEN is a 88 year old Male with history of A fib (on Coumadin) s/p ablation and DCCV, exacerbation of HFpEF, depression, neuropathy and moderate aortic stenosis, complains of increased swelling and shortness of breath. Weight is up  today, the Lasix will be increased to 40 mg twice a day for five days and then back to 40 mg daily . Blood pressure is elevated today, will continue to monitor.       Plan:  Continue Atorvastatin and Warfarin  Follow up on Wednesday   Call with any questions   Please take Lasix 40 mg twice a day for five days and then back to 40 mg daily    Daily weights   Monitor sodium and fluid intake   Blood work today   Elevate legs            Sonja Ruelas, APRN-CNP

## 2024-07-10 ENCOUNTER — OFFICE VISIT (OUTPATIENT)
Dept: CARDIOLOGY | Facility: HOSPITAL | Age: 89
End: 2024-07-10
Payer: MEDICARE

## 2024-07-10 ENCOUNTER — OFFICE VISIT (OUTPATIENT)
Dept: WOUND CARE | Facility: HOSPITAL | Age: 89
End: 2024-07-10
Payer: MEDICARE

## 2024-07-10 VITALS
HEART RATE: 61 BPM | OXYGEN SATURATION: 96 % | BODY MASS INDEX: 41.05 KG/M2 | DIASTOLIC BLOOD PRESSURE: 69 MMHG | WEIGHT: 302.69 LBS | SYSTOLIC BLOOD PRESSURE: 115 MMHG

## 2024-07-10 DIAGNOSIS — R06.02 SHORTNESS OF BREATH: Primary | ICD-10-CM

## 2024-07-10 DIAGNOSIS — I50.30 DIASTOLIC CONGESTIVE HEART FAILURE, UNSPECIFIED HF CHRONICITY (MULTI): ICD-10-CM

## 2024-07-10 PROCEDURE — 29581 APPL MULTLAYER CMPRN SYS LEG: CPT

## 2024-07-10 PROCEDURE — 99213 OFFICE O/P EST LOW 20 MIN: CPT | Performed by: NURSE PRACTITIONER

## 2024-07-10 PROCEDURE — 3078F DIAST BP <80 MM HG: CPT | Performed by: NURSE PRACTITIONER

## 2024-07-10 PROCEDURE — 11042 DBRDMT SUBQ TIS 1ST 20SQCM/<: CPT

## 2024-07-10 PROCEDURE — 1036F TOBACCO NON-USER: CPT | Performed by: NURSE PRACTITIONER

## 2024-07-10 PROCEDURE — 1159F MED LIST DOCD IN RCRD: CPT | Performed by: NURSE PRACTITIONER

## 2024-07-10 PROCEDURE — 3074F SYST BP LT 130 MM HG: CPT | Performed by: NURSE PRACTITIONER

## 2024-07-10 PROCEDURE — 99213 OFFICE O/P EST LOW 20 MIN: CPT | Mod: 25 | Performed by: NURSE PRACTITIONER

## 2024-07-10 ASSESSMENT — ENCOUNTER SYMPTOMS
MYALGIAS: 1
SHORTNESS OF BREATH: 1
NEUROLOGICAL NEGATIVE: 1
ENDOCRINE NEGATIVE: 1
EYES NEGATIVE: 1
PSYCHIATRIC NEGATIVE: 1
HEMATOLOGIC/LYMPHATIC NEGATIVE: 1
GASTROINTESTINAL NEGATIVE: 1
DYSPNEA ON EXERTION: 1

## 2024-07-10 NOTE — PROGRESS NOTES
Referred by Dr. Serna ref. provider found for Follow-up (1 wk.)     History Of Present Illness:    Melecio Whaley is a very pleasant 88 year old gentleman with a history of atrial fibrillation s/p ablation and HFpEF, he is here for a follow up visit. The patient is seen in collaboration with Dr. Jose. He is here today with his son. Complains of shortness of breath. Son feels he needs to continue the Lasix 40 mg twice a day.  States he is short of breath with any activity. Pulse ox has been stable. Denies chest pain or heart palpitations. He cut his leg going to wound care. Currently has legs are wrapped. Monitoring sodium intake.     Review of Systems   Constitutional: Positive for malaise/fatigue.   HENT: Negative.     Eyes: Negative.    Cardiovascular:  Positive for dyspnea on exertion and leg swelling.   Respiratory:  Positive for shortness of breath.    Endocrine: Negative.    Hematologic/Lymphatic: Negative.    Skin: Negative.    Musculoskeletal:  Positive for muscle weakness and myalgias.   Gastrointestinal: Negative.    Neurological: Negative.    Psychiatric/Behavioral: Negative.          Past Medical History:  He has a past medical history of CHF (congestive heart failure) (Multi), Depression, Heart disease, and Personal history of other diseases of the nervous system and sense organs.    Past Surgical History:  He has a past surgical history that includes Other surgical history (06/16/2021); Other surgical history (06/16/2021); Other surgical history (06/16/2021); Other surgical history (06/16/2021); and Other surgical history (06/16/2021).      Social History:  He reports that he has never smoked. He has never used smokeless tobacco. He reports that he does not drink alcohol and does not use drugs.    Family History:  Family History   Problem Relation Name Age of Onset    Coronary artery disease Neg Hx      Stroke Neg Hx          Allergies:  Latex    Outpatient Medications:  Current Outpatient  Medications   Medication Instructions    acetaminophen (TYLENOL) 1,000 mg, oral, 2 times daily    aspirin 81 mg, oral, Daily    atorvastatin (LIPITOR) 40 mg, oral, Nightly    citalopram (CELEXA) 40 mg, oral, Daily    cyanocobalamin (VITAMIN B-12) 1,000 mcg, oral, Daily    furosemide (LASIX) 40 mg, oral, Every Monday, Wednesday & Friday    gabapentin (NEURONTIN) 300 mg, oral, 2 times daily    polyethylene glycol (GLYCOLAX, MIRALAX) 17 g, oral, Daily    warfarin (Coumadin) 5 mg tablet Take as directed per After Visit Summary.        Last Recorded Vitals:  Vitals:    07/10/24 0941   BP: 115/69   Pulse: 61   SpO2: 96%   Weight: 137 kg (302 lb 11.1 oz)       Physical Exam:  Physical Exam  Vitals reviewed.   HENT:      Head: Normocephalic.      Nose: Nose normal.   Eyes:      Pupils: Pupils are equal, round, and reactive to light.   Cardiovascular:      Rate and Rhythm: Normal rate and regular rhythm.   Pulmonary:      Effort: Pulmonary effort is normal.      Breath sounds: Normal breath sounds.   Abdominal:      General: Abdomen is flat.      Palpations: Abdomen is soft.   Musculoskeletal:         General: Normal range of motion.      Cervical back: Normal range of motion.   Skin:     General: Skin is warm and dry.   Neurological:      General: No focal deficit present.      Mental Status: He is alert and oriented to person, place, and time.   Psychiatric:         Mood and Affect: Mood normal.       Extremities +1-+2 pedal edema bilaterally      Last Labs:  CBC -  Lab Results   Component Value Date    WBC 10.3 12/22/2023    HGB 12.9 (L) 12/22/2023    HCT 41.3 12/22/2023    MCV 94 12/22/2023     12/22/2023       CMP -  Lab Results   Component Value Date    CALCIUM 8.2 (L) 07/08/2024    PROT 5.6 (L) 12/15/2023    ALBUMIN 3.0 (L) 12/15/2023    AST 26 12/15/2023    ALT 27 12/15/2023    ALKPHOS 44 12/15/2023    BILITOT 0.5 12/15/2023       LIPID PANEL -   Lab Results   Component Value Date    CHOL 104 12/08/2023     TRIG 63 12/08/2023    HDL 36.6 12/08/2023    CHHDL 2.8 12/08/2023    LDLF 82 05/04/2023    VLDL 13 12/08/2023    NHDL 67 12/08/2023       RENAL FUNCTION PANEL -   Lab Results   Component Value Date    GLUCOSE 89 07/08/2024     07/08/2024    K 5.1 07/08/2024     07/08/2024    CO2 31 07/08/2024    ANIONGAP 13 07/08/2024    BUN 29 (H) 07/08/2024    CREATININE 1.17 07/08/2024    GFRMALE 48 (A) 05/04/2023    CALCIUM 8.2 (L) 07/08/2024    ALBUMIN 3.0 (L) 12/15/2023        Lab Results   Component Value Date     (H) 07/08/2024    HGBA1C 5.3 08/11/2022    HGBA1C 5.5 07/23/2020       Last Cardiology Tests:  ECG:    Echo:  Transthoracic Echo (TTE) Complete 12/08/2023  1. Left ventricular systolic function is normal.   2. Moderately increased left ventricular septal thickness.   3. Moderate aortic valve stenosis.   4. Mildly elevated RVSP.    LUCERO: 7/12/2021  1. The left ventricular systolic function is normal with a 55-60% estimated  ejection fraction.  2. There is mild aortic valve regurgitation.  3. There is mild mitral and tricuspid regurgitation.  4. No left atrial thrombus.     ECHO: 7/9/2021  1. The left ventricular systolic function is normal with a 60-65% estimated  ejection fraction.  2. Spectral Doppler shows an abnormal pattern of left ventricular diastolic  filling.  3. There is moderate concentric left ventricular hypertrophy.  4. The patient is in atrial fibrillation which may influence the estimate of  left ventricular function and transvalvular flows.  5. There is mild mitral and tricuspid regurgitation.  6. The estimated pulmonary artery pressure is mildly elevated with the RVSP at  43.2 mmHg.   Ejection Fractions:  EF   Date/Time Value Ref Range Status   12/08/2023 11:10 AM 60         Cath:    Stress Test:    Cardiac Imaging:      Assessment/Plan   JEAN CLAUDE ALLEN is a 88 year old Male with history of A fib (on Coumadin) s/p ablation and DCCV, exacerbation of HFpEF, depression, neuropathy  and moderate aortic stenosis, complains of increased swelling and shortness of breath. Weight is down, unsure if the last weight was accurate. He will be given one dose of Metolazone 5 mg. Continue Lasix 40 mg twice a day. Heart rate and blood pressure are well controlled today.      Plan:  Continue Atorvastatin and Warfarin  Call on Friday to review weight  Call with any questions   Continue Lasix 40 mg twice a day     Daily weights   Monitor sodium and fluid intake   Take one dose of Metolazone  Elevate legs            Sonja Ruelas, APRN-CNP

## 2024-07-10 NOTE — PATIENT INSTRUCTIONS
DAILY WEIGHTS   CONTINUE LASIX 40 MG TWICE A DAY   CALL ON FRIDAY TO REVIEW WEIGHT  MONITOR SODIUM AND FLUID INTAKE   START METOLAZONE 5 MG ONCE ONLY TAKE WHEN NEEDED MAX TWICE A DAY

## 2024-07-12 ENCOUNTER — TELEPHONE (OUTPATIENT)
Dept: CARDIOLOGY | Facility: HOSPITAL | Age: 89
End: 2024-07-12
Payer: MEDICARE

## 2024-07-16 ENCOUNTER — OFFICE VISIT (OUTPATIENT)
Dept: WOUND CARE | Facility: HOSPITAL | Age: 89
End: 2024-07-16
Payer: MEDICARE

## 2024-07-16 PROCEDURE — 11042 DBRDMT SUBQ TIS 1ST 20SQCM/<: CPT

## 2024-07-17 ENCOUNTER — APPOINTMENT (OUTPATIENT)
Dept: WOUND CARE | Facility: HOSPITAL | Age: 89
End: 2024-07-17
Payer: MEDICARE

## 2024-07-23 ENCOUNTER — OFFICE VISIT (OUTPATIENT)
Dept: WOUND CARE | Facility: HOSPITAL | Age: 89
End: 2024-07-23
Payer: MEDICARE

## 2024-07-23 PROCEDURE — 99212 OFFICE O/P EST SF 10 MIN: CPT

## 2024-07-31 ENCOUNTER — TELEPHONE (OUTPATIENT)
Dept: CARDIOLOGY | Facility: HOSPITAL | Age: 89
End: 2024-07-31
Payer: MEDICARE

## 2024-08-12 ENCOUNTER — OFFICE VISIT (OUTPATIENT)
Dept: PULMONOLOGY | Facility: HOSPITAL | Age: 89
End: 2024-08-12
Payer: MEDICARE

## 2024-08-12 VITALS
RESPIRATION RATE: 16 BRPM | OXYGEN SATURATION: 96 % | WEIGHT: 303 LBS | BODY MASS INDEX: 41.04 KG/M2 | HEIGHT: 72 IN | DIASTOLIC BLOOD PRESSURE: 72 MMHG | SYSTOLIC BLOOD PRESSURE: 123 MMHG | HEART RATE: 62 BPM

## 2024-08-12 DIAGNOSIS — R06.02 SHORTNESS OF BREATH: Primary | ICD-10-CM

## 2024-08-12 DIAGNOSIS — G47.33 OSA (OBSTRUCTIVE SLEEP APNEA): ICD-10-CM

## 2024-08-12 DIAGNOSIS — J30.9 ALLERGIC RHINITIS, UNSPECIFIED SEASONALITY, UNSPECIFIED TRIGGER: ICD-10-CM

## 2024-08-12 PROCEDURE — 3078F DIAST BP <80 MM HG: CPT | Performed by: NURSE PRACTITIONER

## 2024-08-12 PROCEDURE — 3074F SYST BP LT 130 MM HG: CPT | Performed by: NURSE PRACTITIONER

## 2024-08-12 PROCEDURE — 99214 OFFICE O/P EST MOD 30 MIN: CPT | Performed by: NURSE PRACTITIONER

## 2024-08-12 PROCEDURE — RXMED WILLOW AMBULATORY MEDICATION CHARGE

## 2024-08-12 PROCEDURE — 1160F RVW MEDS BY RX/DR IN RCRD: CPT | Performed by: NURSE PRACTITIONER

## 2024-08-12 PROCEDURE — 1159F MED LIST DOCD IN RCRD: CPT | Performed by: NURSE PRACTITIONER

## 2024-08-12 PROCEDURE — 1036F TOBACCO NON-USER: CPT | Performed by: NURSE PRACTITIONER

## 2024-08-12 RX ORDER — LORATADINE 10 MG/1
10 TABLET ORAL DAILY
Qty: 30 TABLET | Refills: 11 | Status: SHIPPED | OUTPATIENT
Start: 2024-08-12

## 2024-08-12 RX ORDER — ALBUTEROL SULFATE 90 UG/1
2 INHALANT RESPIRATORY (INHALATION) EVERY 4 HOURS PRN
Qty: 18 G | Refills: 11 | Status: SHIPPED | OUTPATIENT
Start: 2024-08-12

## 2024-08-12 ASSESSMENT — ENCOUNTER SYMPTOMS
FATIGUE: 0
CHILLS: 0
FEVER: 0
SHORTNESS OF BREATH: 1
UNEXPECTED WEIGHT CHANGE: 0
WHEEZING: 0
RHINORRHEA: 1
COUGH: 0

## 2024-08-12 NOTE — PROGRESS NOTES
Subjective   Patient ID: Melecio Whaley is a 88 y.o. male who presents for NPV for shortness of breath.     HPI: Patient has PMH of chronic diastolic heart failure, depression, anxiety, NED, atrial fibrillation, anxiety, CAD, and HTN. He was referred for shortness of breath. He states that he has shortness of breath on exertion and when he talks. He states that this has been going on for several years. He denies any cough but son hears him wheezing. He states that he is getting sinus drainage when he eats. He is following closely with cardiology for increased leg swelling. He has never smoked but was exposed to second hand smoke. 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 He denies any history of asthma or family history of asthma. He has NED but noncompliant with CPAP. He wears his wife's oxygen on occasion.     Review of Systems   Constitutional:  Negative for chills, fatigue, fever and unexpected weight change.   HENT:  Positive for rhinorrhea. Negative for congestion and postnasal drip.    Respiratory:  Positive for shortness of breath. Negative for cough (denies hemoptysis.) and wheezing.    Cardiovascular:  Positive for leg swelling. Negative for chest pain.   All other systems reviewed and are negative.      Objective   Physical Exam  Vitals reviewed.   Constitutional:       Appearance: Normal appearance.   HENT:      Head: Normocephalic.   Cardiovascular:      Rate and Rhythm: Normal rate and regular rhythm.   Pulmonary:      Effort: Pulmonary effort is normal.      Breath sounds: Decreased breath sounds present.   Skin:     General: Skin is warm and dry.   Neurological:      Mental Status: He is alert.         Assessment/Plan   Shortness of breath  Allergic rhinitis  NED, noncompliant  Chronic diastolic heart failure  Atrial fibrillation on Coumadin  Obesity    Plan:    -CXR ordered. Last CXR with questionable interstitial edema.   -Trial albuterol prn, he did have some emphysematous  changes on prior CT chest from 2021. Educated on use.  -Defer PFTs at present he is very [place]. If improvement with albuterol will consider maintenance therapy on follow up.  -He feels better when he wears his wife's oxygen, I ordered a 6MWT for him.   -He has NED diagnosed several years ago but has been noncompliant with CPAP. He is uninterested in trying this again but does wear his wife's oxygen at HS. If he does not qualify for oxygen on 6MWT I will get NPO for him.   -Start Loratadine for sinus drainage.   -He is following closely with cardiology who just increased diuretics due to increased leg swelling. Last BNP was 339 one month ago.     Overall I will obtain testing and trial albuterol prn for him. I will bring him back with Dr. Matos in 2-3 months. I instructed patient to call sooner if needed.      Total time:  45 min.

## 2024-08-12 NOTE — PATIENT INSTRUCTIONS
Start on albuterol 1-2 puffs as needed, you can use this every 4 hours if this helps.   Start on Loratadine one tablet once a day for sinus drainage.   Please get Chest X-ray done.  Please get oxygen test done.   Call with any questions or concerns.  Follow up with Dr. Matos in 2-3 months.

## 2024-08-14 ENCOUNTER — PHARMACY VISIT (OUTPATIENT)
Dept: PHARMACY | Facility: CLINIC | Age: 89
End: 2024-08-14
Payer: COMMERCIAL

## 2024-08-14 ENCOUNTER — TELEPHONE (OUTPATIENT)
Dept: CARDIOLOGY | Facility: HOSPITAL | Age: 89
End: 2024-08-14
Payer: MEDICARE

## 2024-08-20 ENCOUNTER — APPOINTMENT (OUTPATIENT)
Dept: RESPIRATORY THERAPY | Facility: HOSPITAL | Age: 89
End: 2024-08-20
Payer: MEDICARE

## 2024-08-23 ENCOUNTER — HOSPITAL ENCOUNTER (OUTPATIENT)
Dept: RESPIRATORY THERAPY | Facility: HOSPITAL | Age: 89
Discharge: HOME | End: 2024-08-23
Payer: MEDICARE

## 2024-08-23 DIAGNOSIS — R06.02 SHORTNESS OF BREATH: ICD-10-CM

## 2024-08-23 PROCEDURE — 94618 PULMONARY STRESS TESTING: CPT

## 2024-08-26 ENCOUNTER — TELEPHONE (OUTPATIENT)
Dept: PULMONOLOGY | Facility: HOSPITAL | Age: 89
End: 2024-08-26
Payer: MEDICARE

## 2024-08-26 DIAGNOSIS — R06.02 SHORTNESS OF BREATH: Primary | ICD-10-CM

## 2024-08-26 NOTE — TELEPHONE ENCOUNTER
Patent acknowledged understanding. All questions answered at this time. All orders being sent to [facility]    ----- Message from Omaira Urena sent at 8/26/2024 10:32 AM EDT -----  Please call the patient and let him know that he did not qualify for oxygen on his walk test. I will order a nocturnal pulse ox to see if he needs it at bedtime.

## 2024-08-29 ENCOUNTER — APPOINTMENT (OUTPATIENT)
Dept: RADIOLOGY | Facility: HOSPITAL | Age: 89
DRG: 291 | End: 2024-08-29
Payer: MEDICARE

## 2024-08-29 ENCOUNTER — TELEPHONE (OUTPATIENT)
Dept: CARDIOLOGY | Facility: HOSPITAL | Age: 89
End: 2024-08-29
Payer: MEDICARE

## 2024-08-29 ENCOUNTER — HOSPITAL ENCOUNTER (INPATIENT)
Facility: HOSPITAL | Age: 89
LOS: 2 days | Discharge: HOME | End: 2024-09-01
Attending: STUDENT IN AN ORGANIZED HEALTH CARE EDUCATION/TRAINING PROGRAM | Admitting: INTERNAL MEDICINE
Payer: MEDICARE

## 2024-08-29 DIAGNOSIS — N17.9 AKI (ACUTE KIDNEY INJURY) (CMS-HCC): ICD-10-CM

## 2024-08-29 DIAGNOSIS — R06.02 SHORTNESS OF BREATH: ICD-10-CM

## 2024-08-29 DIAGNOSIS — I50.9 CONGESTIVE HEART FAILURE, UNSPECIFIED HF CHRONICITY, UNSPECIFIED HEART FAILURE TYPE (MULTI): Primary | ICD-10-CM

## 2024-08-29 DIAGNOSIS — J96.01 ACUTE RESPIRATORY FAILURE WITH HYPOXIA (MULTI): ICD-10-CM

## 2024-08-29 LAB
ALBUMIN SERPL BCP-MCNC: 3 G/DL (ref 3.4–5)
ALP SERPL-CCNC: 57 U/L (ref 33–136)
ALT SERPL W P-5'-P-CCNC: 12 U/L (ref 10–52)
ANION GAP SERPL CALC-SCNC: 12 MMOL/L (ref 10–20)
AST SERPL W P-5'-P-CCNC: 21 U/L (ref 9–39)
BASOPHILS # BLD AUTO: 0.06 X10*3/UL (ref 0–0.1)
BASOPHILS NFR BLD AUTO: 0.5 %
BILIRUB SERPL-MCNC: 0.5 MG/DL (ref 0–1.2)
BNP SERPL-MCNC: 312 PG/ML (ref 0–99)
BUN SERPL-MCNC: 32 MG/DL (ref 6–23)
CALCIUM SERPL-MCNC: 7.7 MG/DL (ref 8.6–10.3)
CARDIAC TROPONIN I PNL SERPL HS: 43 NG/L (ref 0–20)
CHLORIDE SERPL-SCNC: 101 MMOL/L (ref 98–107)
CO2 SERPL-SCNC: 33 MMOL/L (ref 21–32)
CREAT SERPL-MCNC: 1.17 MG/DL (ref 0.5–1.3)
EGFRCR SERPLBLD CKD-EPI 2021: 61 ML/MIN/1.73M*2
EOSINOPHIL # BLD AUTO: 0.67 X10*3/UL (ref 0–0.4)
EOSINOPHIL NFR BLD AUTO: 6 %
ERYTHROCYTE [DISTWIDTH] IN BLOOD BY AUTOMATED COUNT: 16.5 % (ref 11.5–14.5)
GLUCOSE SERPL-MCNC: 86 MG/DL (ref 74–99)
HCT VFR BLD AUTO: 37.2 % (ref 41–52)
HGB BLD-MCNC: 11.5 G/DL (ref 13.5–17.5)
IMM GRANULOCYTES # BLD AUTO: 0.05 X10*3/UL (ref 0–0.5)
IMM GRANULOCYTES NFR BLD AUTO: 0.4 % (ref 0–0.9)
INR PPP: 2.8 (ref 0.9–1.1)
LYMPHOCYTES # BLD AUTO: 0.95 X10*3/UL (ref 0.8–3)
LYMPHOCYTES NFR BLD AUTO: 8.5 %
MAGNESIUM SERPL-MCNC: 2.37 MG/DL (ref 1.6–2.4)
MCH RBC QN AUTO: 29.3 PG (ref 26–34)
MCHC RBC AUTO-ENTMCNC: 30.9 G/DL (ref 32–36)
MCV RBC AUTO: 95 FL (ref 80–100)
MONOCYTES # BLD AUTO: 0.94 X10*3/UL (ref 0.05–0.8)
MONOCYTES NFR BLD AUTO: 8.4 %
NEUTROPHILS # BLD AUTO: 8.53 X10*3/UL (ref 1.6–5.5)
NEUTROPHILS NFR BLD AUTO: 76.2 %
NRBC BLD-RTO: 0 /100 WBCS (ref 0–0)
PLATELET # BLD AUTO: 239 X10*3/UL (ref 150–450)
POTASSIUM SERPL-SCNC: 4.8 MMOL/L (ref 3.5–5.3)
PROT SERPL-MCNC: 6.2 G/DL (ref 6.4–8.2)
PROTHROMBIN TIME: 31.5 SECONDS (ref 9.8–12.8)
RBC # BLD AUTO: 3.93 X10*6/UL (ref 4.5–5.9)
SARS-COV-2 RNA RESP QL NAA+PROBE: NOT DETECTED
SODIUM SERPL-SCNC: 141 MMOL/L (ref 136–145)
WBC # BLD AUTO: 11.2 X10*3/UL (ref 4.4–11.3)

## 2024-08-29 PROCEDURE — 85025 COMPLETE CBC W/AUTO DIFF WBC: CPT | Performed by: PHYSICIAN ASSISTANT

## 2024-08-29 PROCEDURE — 87635 SARS-COV-2 COVID-19 AMP PRB: CPT | Performed by: PHYSICIAN ASSISTANT

## 2024-08-29 PROCEDURE — 85610 PROTHROMBIN TIME: CPT | Performed by: PHYSICIAN ASSISTANT

## 2024-08-29 PROCEDURE — 84484 ASSAY OF TROPONIN QUANT: CPT | Performed by: PHYSICIAN ASSISTANT

## 2024-08-29 PROCEDURE — 71045 X-RAY EXAM CHEST 1 VIEW: CPT

## 2024-08-29 PROCEDURE — 83735 ASSAY OF MAGNESIUM: CPT | Performed by: PHYSICIAN ASSISTANT

## 2024-08-29 PROCEDURE — 83880 ASSAY OF NATRIURETIC PEPTIDE: CPT | Performed by: PHYSICIAN ASSISTANT

## 2024-08-29 PROCEDURE — 80053 COMPREHEN METABOLIC PANEL: CPT | Performed by: PHYSICIAN ASSISTANT

## 2024-08-29 PROCEDURE — 36415 COLL VENOUS BLD VENIPUNCTURE: CPT | Performed by: PHYSICIAN ASSISTANT

## 2024-08-29 PROCEDURE — 2500000004 HC RX 250 GENERAL PHARMACY W/ HCPCS (ALT 636 FOR OP/ED): Performed by: PHYSICIAN ASSISTANT

## 2024-08-29 PROCEDURE — 71045 X-RAY EXAM CHEST 1 VIEW: CPT | Mod: FOREIGN READ | Performed by: RADIOLOGY

## 2024-08-29 PROCEDURE — 96374 THER/PROPH/DIAG INJ IV PUSH: CPT

## 2024-08-29 RX ORDER — FUROSEMIDE 10 MG/ML
40 INJECTION INTRAMUSCULAR; INTRAVENOUS ONCE
Status: COMPLETED | OUTPATIENT
Start: 2024-08-29 | End: 2024-08-29

## 2024-08-29 RX ADMIN — FUROSEMIDE 40 MG: 10 INJECTION, SOLUTION INTRAMUSCULAR; INTRAVENOUS at 23:42

## 2024-08-30 ENCOUNTER — APPOINTMENT (OUTPATIENT)
Dept: CARDIOLOGY | Facility: HOSPITAL | Age: 89
DRG: 291 | End: 2024-08-30
Payer: MEDICARE

## 2024-08-30 PROBLEM — I50.9 CONGESTIVE HEART FAILURE, UNSPECIFIED HF CHRONICITY, UNSPECIFIED HEART FAILURE TYPE (MULTI): Status: ACTIVE | Noted: 2024-08-30

## 2024-08-30 PROBLEM — I50.9 CHF EXACERBATION (MULTI): Status: ACTIVE | Noted: 2024-08-30

## 2024-08-30 LAB
ANION GAP SERPL CALC-SCNC: 10 MMOL/L (ref 10–20)
AORTIC VALVE MEAN GRADIENT: 16.7 MMHG
AORTIC VALVE PEAK VELOCITY: 2.74 M/S
APPEARANCE UR: CLEAR
ATRIAL RATE: 60 BPM
AV PEAK GRADIENT: 30 MMHG
AVA (PEAK VEL): 1.32 CM2
AVA (VTI): 0.94 CM2
BILIRUB UR STRIP.AUTO-MCNC: NEGATIVE MG/DL
BUN SERPL-MCNC: 31 MG/DL (ref 6–23)
CALCIUM SERPL-MCNC: 8 MG/DL (ref 8.6–10.3)
CARDIAC TROPONIN I PNL SERPL HS: 44 NG/L (ref 0–20)
CARDIAC TROPONIN I PNL SERPL HS: 45 NG/L (ref 0–20)
CHLORIDE SERPL-SCNC: 99 MMOL/L (ref 98–107)
CO2 SERPL-SCNC: 35 MMOL/L (ref 21–32)
COLOR UR: COLORLESS
CREAT SERPL-MCNC: 1.19 MG/DL (ref 0.5–1.3)
EGFRCR SERPLBLD CKD-EPI 2021: 59 ML/MIN/1.73M*2
EJECTION FRACTION APICAL 4 CHAMBER: 55.6
EJECTION FRACTION: 58 %
GLUCOSE SERPL-MCNC: 81 MG/DL (ref 74–99)
GLUCOSE UR STRIP.AUTO-MCNC: NORMAL MG/DL
HOLD SPECIMEN: NORMAL
KETONES UR STRIP.AUTO-MCNC: NEGATIVE MG/DL
LEFT ATRIUM VOLUME AREA LENGTH INDEX BSA: 49.9 ML/M2
LEFT VENTRICLE INTERNAL DIMENSION DIASTOLE: 4.37 CM (ref 3.5–6)
LEFT VENTRICULAR OUTFLOW TRACT DIAMETER: 2.15 CM
LEUKOCYTE ESTERASE UR QL STRIP.AUTO: NEGATIVE
MITRAL VALVE E/A RATIO: 2.4
NITRITE UR QL STRIP.AUTO: NEGATIVE
PH UR STRIP.AUTO: 7.5 [PH]
POTASSIUM SERPL-SCNC: 4 MMOL/L (ref 3.5–5.3)
PROT UR STRIP.AUTO-MCNC: NEGATIVE MG/DL
Q ONSET: 201 MS
Q ONSET: 204 MS
QRS COUNT: 10 BEATS
QRS COUNT: 10 BEATS
QRS DURATION: 120 MS
QRS DURATION: 132 MS
QT INTERVAL: 458 MS
QT INTERVAL: 462 MS
QTC CALCULATION(BAZETT): 461 MS
QTC CALCULATION(BAZETT): 462 MS
QTC FREDERICIA: 460 MS
QTC FREDERICIA: 462 MS
R AXIS: -6 DEGREES
R AXIS: 12 DEGREES
RBC # UR STRIP.AUTO: NEGATIVE /UL
RIGHT VENTRICLE FREE WALL PEAK S': 6 CM/S
RIGHT VENTRICLE PEAK SYSTOLIC PRESSURE: 47.3 MMHG
SODIUM SERPL-SCNC: 140 MMOL/L (ref 136–145)
SP GR UR STRIP.AUTO: 1.01
T AXIS: -19 DEGREES
T AXIS: -28 DEGREES
T OFFSET: 432 MS
T OFFSET: 433 MS
TRICUSPID ANNULAR PLANE SYSTOLIC EXCURSION: 1.6 CM
UROBILINOGEN UR STRIP.AUTO-MCNC: NORMAL MG/DL
VENTRICULAR RATE: 60 BPM
VENTRICULAR RATE: 61 BPM

## 2024-08-30 PROCEDURE — 80048 BASIC METABOLIC PNL TOTAL CA: CPT | Performed by: NURSE PRACTITIONER

## 2024-08-30 PROCEDURE — 93005 ELECTROCARDIOGRAM TRACING: CPT

## 2024-08-30 PROCEDURE — 94664 DEMO&/EVAL PT USE INHALER: CPT

## 2024-08-30 PROCEDURE — 9420000001 HC RT PATIENT EDUCATION 5 MIN

## 2024-08-30 PROCEDURE — 99223 1ST HOSP IP/OBS HIGH 75: CPT | Performed by: NURSE PRACTITIONER

## 2024-08-30 PROCEDURE — 2500000004 HC RX 250 GENERAL PHARMACY W/ HCPCS (ALT 636 FOR OP/ED): Performed by: PHYSICIAN ASSISTANT

## 2024-08-30 PROCEDURE — 2500000002 HC RX 250 W HCPCS SELF ADMINISTERED DRUGS (ALT 637 FOR MEDICARE OP, ALT 636 FOR OP/ED): Performed by: INTERNAL MEDICINE

## 2024-08-30 PROCEDURE — 36415 COLL VENOUS BLD VENIPUNCTURE: CPT | Performed by: PHYSICIAN ASSISTANT

## 2024-08-30 PROCEDURE — 81003 URINALYSIS AUTO W/O SCOPE: CPT | Performed by: NURSE PRACTITIONER

## 2024-08-30 PROCEDURE — 36415 COLL VENOUS BLD VENIPUNCTURE: CPT | Performed by: NURSE PRACTITIONER

## 2024-08-30 PROCEDURE — 93306 TTE W/DOPPLER COMPLETE: CPT

## 2024-08-30 PROCEDURE — 2500000001 HC RX 250 WO HCPCS SELF ADMINISTERED DRUGS (ALT 637 FOR MEDICARE OP): Performed by: NURSE PRACTITIONER

## 2024-08-30 PROCEDURE — 99291 CRITICAL CARE FIRST HOUR: CPT | Mod: CS | Performed by: STUDENT IN AN ORGANIZED HEALTH CARE EDUCATION/TRAINING PROGRAM

## 2024-08-30 PROCEDURE — 2500000004 HC RX 250 GENERAL PHARMACY W/ HCPCS (ALT 636 FOR OP/ED)

## 2024-08-30 PROCEDURE — 84484 ASSAY OF TROPONIN QUANT: CPT | Performed by: PHYSICIAN ASSISTANT

## 2024-08-30 PROCEDURE — 2500000005 HC RX 250 GENERAL PHARMACY W/O HCPCS: Performed by: NURSE PRACTITIONER

## 2024-08-30 PROCEDURE — 93306 TTE W/DOPPLER COMPLETE: CPT | Performed by: STUDENT IN AN ORGANIZED HEALTH CARE EDUCATION/TRAINING PROGRAM

## 2024-08-30 PROCEDURE — 2500000004 HC RX 250 GENERAL PHARMACY W/ HCPCS (ALT 636 FOR OP/ED): Performed by: NURSE PRACTITIONER

## 2024-08-30 PROCEDURE — 1200000002 HC GENERAL ROOM WITH TELEMETRY DAILY

## 2024-08-30 PROCEDURE — 87081 CULTURE SCREEN ONLY: CPT | Mod: GEALAB | Performed by: NURSE PRACTITIONER

## 2024-08-30 PROCEDURE — 2500000002 HC RX 250 W HCPCS SELF ADMINISTERED DRUGS (ALT 637 FOR MEDICARE OP, ALT 636 FOR OP/ED): Performed by: NURSE PRACTITIONER

## 2024-08-30 PROCEDURE — 84484 ASSAY OF TROPONIN QUANT: CPT | Performed by: NURSE PRACTITIONER

## 2024-08-30 PROCEDURE — 99222 1ST HOSP IP/OBS MODERATE 55: CPT

## 2024-08-30 PROCEDURE — 94760 N-INVAS EAR/PLS OXIMETRY 1: CPT

## 2024-08-30 PROCEDURE — 94640 AIRWAY INHALATION TREATMENT: CPT

## 2024-08-30 RX ORDER — BENZONATATE 100 MG/1
100 CAPSULE ORAL 3 TIMES DAILY PRN
Status: DISCONTINUED | OUTPATIENT
Start: 2024-08-30 | End: 2024-09-01 | Stop reason: HOSPADM

## 2024-08-30 RX ORDER — AMMONIUM LACTATE 12 G/100G
1 LOTION TOPICAL AS NEEDED
Status: DISCONTINUED | OUTPATIENT
Start: 2024-08-30 | End: 2024-09-01 | Stop reason: HOSPADM

## 2024-08-30 RX ORDER — WARFARIN 3 MG/1
3 TABLET ORAL DAILY
Status: DISCONTINUED | OUTPATIENT
Start: 2024-08-30 | End: 2024-09-01 | Stop reason: HOSPADM

## 2024-08-30 RX ORDER — MENTHOL AND ZINC OXIDE .44; 20.625 G/100G; G/100G
1 OINTMENT TOPICAL 2 TIMES DAILY PRN
COMMUNITY

## 2024-08-30 RX ORDER — ASPIRIN 81 MG/1
81 TABLET ORAL DAILY
Status: DISCONTINUED | OUTPATIENT
Start: 2024-08-30 | End: 2024-09-01 | Stop reason: HOSPADM

## 2024-08-30 RX ORDER — FERROUS SULFATE, DRIED 160(50) MG
1 TABLET, EXTENDED RELEASE ORAL DAILY
Status: DISCONTINUED | OUTPATIENT
Start: 2024-08-30 | End: 2024-09-01 | Stop reason: HOSPADM

## 2024-08-30 RX ORDER — LANOLIN ALCOHOL/MO/W.PET/CERES
1000 CREAM (GRAM) TOPICAL DAILY
Status: DISCONTINUED | OUTPATIENT
Start: 2024-08-30 | End: 2024-09-01 | Stop reason: HOSPADM

## 2024-08-30 RX ORDER — CALCIUM CARBONATE/VITAMIN D3 500 MG-10
1 TABLET,CHEWABLE ORAL DAILY
COMMUNITY

## 2024-08-30 RX ORDER — WARFARIN 3 MG/1
3 TABLET ORAL DAILY
COMMUNITY

## 2024-08-30 RX ORDER — CITALOPRAM 20 MG/1
40 TABLET, FILM COATED ORAL DAILY
Status: DISCONTINUED | OUTPATIENT
Start: 2024-08-30 | End: 2024-09-01 | Stop reason: HOSPADM

## 2024-08-30 RX ORDER — ACETAMINOPHEN 325 MG/1
1000 TABLET ORAL 2 TIMES DAILY
Status: DISCONTINUED | OUTPATIENT
Start: 2024-08-30 | End: 2024-09-01 | Stop reason: HOSPADM

## 2024-08-30 RX ORDER — TORSEMIDE 20 MG/1
40 TABLET ORAL DAILY
Status: DISCONTINUED | OUTPATIENT
Start: 2024-09-01 | End: 2024-08-31

## 2024-08-30 RX ORDER — ALBUTEROL SULFATE 0.83 MG/ML
2.5 SOLUTION RESPIRATORY (INHALATION) EVERY 2 HOUR PRN
Status: DISCONTINUED | OUTPATIENT
Start: 2024-08-30 | End: 2024-09-01 | Stop reason: HOSPADM

## 2024-08-30 RX ORDER — ALBUTEROL SULFATE 0.83 MG/ML
2.5 SOLUTION RESPIRATORY (INHALATION) EVERY 4 HOURS PRN
Status: DISCONTINUED | OUTPATIENT
Start: 2024-08-30 | End: 2024-08-30

## 2024-08-30 RX ORDER — CETIRIZINE HYDROCHLORIDE 10 MG/1
10 TABLET ORAL DAILY
Status: DISCONTINUED | OUTPATIENT
Start: 2024-08-30 | End: 2024-09-01 | Stop reason: HOSPADM

## 2024-08-30 RX ORDER — AMMONIUM LACTATE 12 G/100G
1 CREAM TOPICAL AS NEEDED
COMMUNITY

## 2024-08-30 RX ORDER — GABAPENTIN 300 MG/1
300 CAPSULE ORAL 2 TIMES DAILY
Status: DISCONTINUED | OUTPATIENT
Start: 2024-08-30 | End: 2024-09-01 | Stop reason: HOSPADM

## 2024-08-30 RX ORDER — FUROSEMIDE 10 MG/ML
80 INJECTION INTRAMUSCULAR; INTRAVENOUS EVERY 12 HOURS
Status: DISCONTINUED | OUTPATIENT
Start: 2024-08-30 | End: 2024-08-31

## 2024-08-30 RX ORDER — POLYETHYLENE GLYCOL 3350 17 G/17G
17 POWDER, FOR SOLUTION ORAL DAILY
Status: DISCONTINUED | OUTPATIENT
Start: 2024-08-30 | End: 2024-09-01 | Stop reason: HOSPADM

## 2024-08-30 RX ORDER — FUROSEMIDE 10 MG/ML
40 INJECTION INTRAMUSCULAR; INTRAVENOUS EVERY 12 HOURS
Status: DISCONTINUED | OUTPATIENT
Start: 2024-08-30 | End: 2024-08-30

## 2024-08-30 RX ORDER — POTASSIUM CHLORIDE 20 MEQ/1
20 TABLET, EXTENDED RELEASE ORAL 2 TIMES DAILY
Status: DISCONTINUED | OUTPATIENT
Start: 2024-08-30 | End: 2024-09-01 | Stop reason: HOSPADM

## 2024-08-30 RX ORDER — IPRATROPIUM BROMIDE AND ALBUTEROL SULFATE 2.5; .5 MG/3ML; MG/3ML
3 SOLUTION RESPIRATORY (INHALATION)
Status: DISCONTINUED | OUTPATIENT
Start: 2024-08-30 | End: 2024-09-01 | Stop reason: HOSPADM

## 2024-08-30 RX ORDER — MENTHOL AND ZINC OXIDE .44; 20.625 G/100G; G/100G
1 OINTMENT TOPICAL 2 TIMES DAILY PRN
Status: DISCONTINUED | OUTPATIENT
Start: 2024-08-30 | End: 2024-09-01 | Stop reason: HOSPADM

## 2024-08-30 RX ORDER — ATORVASTATIN CALCIUM 40 MG/1
40 TABLET, FILM COATED ORAL NIGHTLY
Status: DISCONTINUED | OUTPATIENT
Start: 2024-08-30 | End: 2024-09-01 | Stop reason: HOSPADM

## 2024-08-30 RX ORDER — DOCUSATE SODIUM 100 MG/1
100 CAPSULE, LIQUID FILLED ORAL 2 TIMES DAILY
Status: DISCONTINUED | OUTPATIENT
Start: 2024-08-30 | End: 2024-09-01 | Stop reason: HOSPADM

## 2024-08-30 RX ORDER — IPRATROPIUM BROMIDE AND ALBUTEROL SULFATE 2.5; .5 MG/3ML; MG/3ML
3 SOLUTION RESPIRATORY (INHALATION) 3 TIMES DAILY
Status: DISCONTINUED | OUTPATIENT
Start: 2024-08-30 | End: 2024-08-30

## 2024-08-30 RX ORDER — CEFAZOLIN SODIUM 1 G/50ML
1 SOLUTION INTRAVENOUS EVERY 8 HOURS
Status: DISCONTINUED | OUTPATIENT
Start: 2024-08-30 | End: 2024-08-31

## 2024-08-30 RX ADMIN — IPRATROPIUM BROMIDE AND ALBUTEROL SULFATE 3 ML: .5; 3 SOLUTION RESPIRATORY (INHALATION) at 07:44

## 2024-08-30 RX ADMIN — ACETAMINOPHEN 975 MG: 325 TABLET ORAL at 02:49

## 2024-08-30 RX ADMIN — CEFAZOLIN SODIUM 1 G: 1 INJECTION, SOLUTION INTRAVENOUS at 18:44

## 2024-08-30 RX ADMIN — DOCUSATE SODIUM 100 MG: 100 CAPSULE, LIQUID FILLED ORAL at 02:49

## 2024-08-30 RX ADMIN — WARFARIN SODIUM 3 MG: 3 TABLET ORAL at 18:44

## 2024-08-30 RX ADMIN — CEFAZOLIN SODIUM 1 G: 1 INJECTION, SOLUTION INTRAVENOUS at 11:44

## 2024-08-30 RX ADMIN — BENZONATATE 100 MG: 100 CAPSULE ORAL at 22:20

## 2024-08-30 RX ADMIN — CITALOPRAM HYDROBROMIDE 40 MG: 20 TABLET ORAL at 09:04

## 2024-08-30 RX ADMIN — Medication 1 TABLET: at 09:04

## 2024-08-30 RX ADMIN — DOCUSATE SODIUM 100 MG: 100 CAPSULE, LIQUID FILLED ORAL at 21:13

## 2024-08-30 RX ADMIN — POTASSIUM CHLORIDE 20 MEQ: 1500 TABLET, EXTENDED RELEASE ORAL at 21:13

## 2024-08-30 RX ADMIN — Medication 1000 MCG: at 09:04

## 2024-08-30 RX ADMIN — ATORVASTATIN CALCIUM 40 MG: 40 TABLET, FILM COATED ORAL at 02:49

## 2024-08-30 RX ADMIN — IPRATROPIUM BROMIDE AND ALBUTEROL SULFATE 3 ML: .5; 3 SOLUTION RESPIRATORY (INHALATION) at 19:55

## 2024-08-30 RX ADMIN — POTASSIUM CHLORIDE 20 MEQ: 1500 TABLET, EXTENDED RELEASE ORAL at 09:04

## 2024-08-30 RX ADMIN — GABAPENTIN 300 MG: 300 CAPSULE ORAL at 09:04

## 2024-08-30 RX ADMIN — ALBUTEROL SULFATE 2.5 MG: 2.5 SOLUTION RESPIRATORY (INHALATION) at 16:41

## 2024-08-30 RX ADMIN — ACETAMINOPHEN 975 MG: 325 TABLET ORAL at 21:13

## 2024-08-30 RX ADMIN — GABAPENTIN 300 MG: 300 CAPSULE ORAL at 21:13

## 2024-08-30 RX ADMIN — GABAPENTIN 300 MG: 300 CAPSULE ORAL at 02:54

## 2024-08-30 RX ADMIN — FUROSEMIDE 40 MG: 10 INJECTION, SOLUTION INTRAVENOUS at 05:47

## 2024-08-30 RX ADMIN — Medication 2 L/MIN: at 07:44

## 2024-08-30 RX ADMIN — ACETAMINOPHEN 975 MG: 325 TABLET ORAL at 09:03

## 2024-08-30 RX ADMIN — CETIRIZINE HYDROCHLORIDE 10 MG: 10 TABLET, FILM COATED ORAL at 09:04

## 2024-08-30 RX ADMIN — PERFLUTREN 2 ML OF DILUTION: 6.52 INJECTION, SUSPENSION INTRAVENOUS at 14:57

## 2024-08-30 RX ADMIN — ASPIRIN 81 MG: 81 TABLET, COATED ORAL at 09:03

## 2024-08-30 RX ADMIN — ATORVASTATIN CALCIUM 40 MG: 40 TABLET, FILM COATED ORAL at 21:13

## 2024-08-30 RX ADMIN — CEFAZOLIN SODIUM 1 G: 1 INJECTION, SOLUTION INTRAVENOUS at 05:47

## 2024-08-30 RX ADMIN — Medication 2 L/MIN: at 19:55

## 2024-08-30 RX ADMIN — Medication 2 L/MIN: at 03:00

## 2024-08-30 RX ADMIN — FUROSEMIDE 80 MG: 10 INJECTION, SOLUTION INTRAVENOUS at 18:43

## 2024-08-30 SDOH — HEALTH STABILITY: MENTAL HEALTH: HOW OFTEN DO YOU HAVE 6 OR MORE DRINKS ON ONE OCCASION?: NEVER

## 2024-08-30 SDOH — ECONOMIC STABILITY: HOUSING INSECURITY: IN THE PAST 12 MONTHS, HOW MANY TIMES HAVE YOU MOVED WHERE YOU WERE LIVING?: 0

## 2024-08-30 SDOH — HEALTH STABILITY: MENTAL HEALTH: HOW MANY STANDARD DRINKS CONTAINING ALCOHOL DO YOU HAVE ON A TYPICAL DAY?: PATIENT DOES NOT DRINK

## 2024-08-30 SDOH — ECONOMIC STABILITY: HOUSING INSECURITY: AT ANY TIME IN THE PAST 12 MONTHS, WERE YOU HOMELESS OR LIVING IN A SHELTER (INCLUDING NOW)?: NO

## 2024-08-30 SDOH — HEALTH STABILITY: MENTAL HEALTH: HOW OFTEN DO YOU HAVE A DRINK CONTAINING ALCOHOL?: NEVER

## 2024-08-30 SDOH — ECONOMIC STABILITY: INCOME INSECURITY: IN THE LAST 12 MONTHS, WAS THERE A TIME WHEN YOU WERE NOT ABLE TO PAY THE MORTGAGE OR RENT ON TIME?: NO

## 2024-08-30 SDOH — SOCIAL STABILITY: SOCIAL INSECURITY: DO YOU FEEL ANYONE HAS EXPLOITED OR TAKEN ADVANTAGE OF YOU FINANCIALLY OR OF YOUR PERSONAL PROPERTY?: NO

## 2024-08-30 SDOH — ECONOMIC STABILITY: INCOME INSECURITY: HOW HARD IS IT FOR YOU TO PAY FOR THE VERY BASICS LIKE FOOD, HOUSING, MEDICAL CARE, AND HEATING?: NOT HARD AT ALL

## 2024-08-30 SDOH — SOCIAL STABILITY: SOCIAL INSECURITY: ABUSE: ADULT

## 2024-08-30 SDOH — SOCIAL STABILITY: SOCIAL INSECURITY: ARE YOU OR HAVE YOU BEEN THREATENED OR ABUSED PHYSICALLY, EMOTIONALLY, OR SEXUALLY BY ANYONE?: NO

## 2024-08-30 SDOH — ECONOMIC STABILITY: TRANSPORTATION INSECURITY
IN THE PAST 12 MONTHS, HAS LACK OF TRANSPORTATION KEPT YOU FROM MEETINGS, WORK, OR FROM GETTING THINGS NEEDED FOR DAILY LIVING?: NO

## 2024-08-30 SDOH — SOCIAL STABILITY: SOCIAL INSECURITY: DO YOU FEEL UNSAFE GOING BACK TO THE PLACE WHERE YOU ARE LIVING?: NO

## 2024-08-30 SDOH — SOCIAL STABILITY: SOCIAL INSECURITY: ARE THERE ANY APPARENT SIGNS OF INJURIES/BEHAVIORS THAT COULD BE RELATED TO ABUSE/NEGLECT?: NO

## 2024-08-30 SDOH — SOCIAL STABILITY: SOCIAL INSECURITY: HAVE YOU HAD THOUGHTS OF HARMING ANYONE ELSE?: NO

## 2024-08-30 SDOH — SOCIAL STABILITY: SOCIAL INSECURITY: DOES ANYONE TRY TO KEEP YOU FROM HAVING/CONTACTING OTHER FRIENDS OR DOING THINGS OUTSIDE YOUR HOME?: NO

## 2024-08-30 SDOH — ECONOMIC STABILITY: TRANSPORTATION INSECURITY
IN THE PAST 12 MONTHS, HAS THE LACK OF TRANSPORTATION KEPT YOU FROM MEDICAL APPOINTMENTS OR FROM GETTING MEDICATIONS?: NO

## 2024-08-30 SDOH — SOCIAL STABILITY: SOCIAL INSECURITY: HAS ANYONE EVER THREATENED TO HURT YOUR FAMILY OR YOUR PETS?: NO

## 2024-08-30 SDOH — SOCIAL STABILITY: SOCIAL INSECURITY: HAVE YOU HAD ANY THOUGHTS OF HARMING ANYONE ELSE?: NO

## 2024-08-30 ASSESSMENT — COGNITIVE AND FUNCTIONAL STATUS - GENERAL
TOILETING: A LOT
PATIENT BASELINE BEDBOUND: NO
MOBILITY SCORE: 9
TURNING FROM BACK TO SIDE WHILE IN FLAT BAD: A LOT
HELP NEEDED FOR BATHING: A LOT
MOVING FROM LYING ON BACK TO SITTING ON SIDE OF FLAT BED WITH BEDRAILS: A LOT
CLIMB 3 TO 5 STEPS WITH RAILING: TOTAL
TOILETING: A LOT
CLIMB 3 TO 5 STEPS WITH RAILING: TOTAL
PATIENT BASELINE BEDBOUND: NO
TOILETING: A LOT
HELP NEEDED FOR BATHING: A LOT
MOVING TO AND FROM BED TO CHAIR: A LOT
PERSONAL GROOMING: A LITTLE
PERSONAL GROOMING: A LITTLE
TURNING FROM BACK TO SIDE WHILE IN FLAT BAD: A LOT
MOVING FROM LYING ON BACK TO SITTING ON SIDE OF FLAT BED WITH BEDRAILS: A LOT
STANDING UP FROM CHAIR USING ARMS: TOTAL
MOBILITY SCORE: 9
MOBILITY SCORE: 9
MOVING TO AND FROM BED TO CHAIR: A LOT
STANDING UP FROM CHAIR USING ARMS: TOTAL
DRESSING REGULAR LOWER BODY CLOTHING: A LOT
TURNING FROM BACK TO SIDE WHILE IN FLAT BAD: A LOT
DRESSING REGULAR UPPER BODY CLOTHING: A LOT
WALKING IN HOSPITAL ROOM: TOTAL
DRESSING REGULAR LOWER BODY CLOTHING: A LOT
HELP NEEDED FOR BATHING: A LOT
DRESSING REGULAR UPPER BODY CLOTHING: A LOT
MOVING TO AND FROM BED TO CHAIR: A LOT
DAILY ACTIVITIY SCORE: 16
WALKING IN HOSPITAL ROOM: TOTAL
DAILY ACTIVITIY SCORE: 15
STANDING UP FROM CHAIR USING ARMS: TOTAL
DRESSING REGULAR UPPER BODY CLOTHING: A LOT
CLIMB 3 TO 5 STEPS WITH RAILING: TOTAL
DRESSING REGULAR LOWER BODY CLOTHING: A LOT
WALKING IN HOSPITAL ROOM: TOTAL
MOVING FROM LYING ON BACK TO SITTING ON SIDE OF FLAT BED WITH BEDRAILS: A LOT
DAILY ACTIVITIY SCORE: 15

## 2024-08-30 ASSESSMENT — PAIN SCALES - GENERAL
PAINLEVEL_OUTOF10: 0 - NO PAIN
PAINLEVEL_OUTOF10: 4
PAINLEVEL_OUTOF10: 0 - NO PAIN
PAINLEVEL_OUTOF10: 0 - NO PAIN

## 2024-08-30 ASSESSMENT — ACTIVITIES OF DAILY LIVING (ADL)
HEARING - RIGHT EAR: DIFFICULTY WITH NOISE
PATIENT'S MEMORY ADEQUATE TO SAFELY COMPLETE DAILY ACTIVITIES?: NO
HEARING - LEFT EAR: DIFFICULTY WITH NOISE
LACK_OF_TRANSPORTATION: NO
FEEDING YOURSELF: NEEDS ASSISTANCE
BATHING: NEEDS ASSISTANCE
DRESSING YOURSELF: NEEDS ASSISTANCE
ADEQUATE_TO_COMPLETE_ADL: YES
WALKS IN HOME: DEPENDENT
JUDGMENT_ADEQUATE_SAFELY_COMPLETE_DAILY_ACTIVITIES: NO
GROOMING: NEEDS ASSISTANCE
TOILETING: NEEDS ASSISTANCE

## 2024-08-30 ASSESSMENT — LIFESTYLE VARIABLES
EVER HAD A DRINK FIRST THING IN THE MORNING TO STEADY YOUR NERVES TO GET RID OF A HANGOVER: NO
TOTAL SCORE: 0
HAVE YOU EVER FELT YOU SHOULD CUT DOWN ON YOUR DRINKING: NO
EVER FELT BAD OR GUILTY ABOUT YOUR DRINKING: NO
AUDIT-C TOTAL SCORE: 0
HAVE PEOPLE ANNOYED YOU BY CRITICIZING YOUR DRINKING: NO
HOW MANY STANDARD DRINKS CONTAINING ALCOHOL DO YOU HAVE ON A TYPICAL DAY: PATIENT DOES NOT DRINK
HOW OFTEN DO YOU HAVE A DRINK CONTAINING ALCOHOL: NEVER
SKIP TO QUESTIONS 9-10: 1
AUDIT-C TOTAL SCORE: 0
AUDIT-C TOTAL SCORE: 0
SKIP TO QUESTIONS 9-10: 1
HOW OFTEN DO YOU HAVE 6 OR MORE DRINKS ON ONE OCCASION: NEVER

## 2024-08-30 ASSESSMENT — PATIENT HEALTH QUESTIONNAIRE - PHQ9
1. LITTLE INTEREST OR PLEASURE IN DOING THINGS: NOT AT ALL
SUM OF ALL RESPONSES TO PHQ9 QUESTIONS 1 & 2: 0
2. FEELING DOWN, DEPRESSED OR HOPELESS: NOT AT ALL

## 2024-08-30 ASSESSMENT — PAIN - FUNCTIONAL ASSESSMENT
PAIN_FUNCTIONAL_ASSESSMENT: 0-10
PAIN_FUNCTIONAL_ASSESSMENT: 0-10

## 2024-08-30 ASSESSMENT — PAIN DESCRIPTION - LOCATION: LOCATION: LEG

## 2024-08-30 ASSESSMENT — ENCOUNTER SYMPTOMS
HEADACHES: 0
EYE ITCHING: 0
DIZZINESS: 0
DYSURIA: 0
VOMITING: 0
ARTHRALGIAS: 0
SINUS PRESSURE: 0
CONFUSION: 0
MYALGIAS: 0
AGITATION: 0
CONSTIPATION: 0
EYE REDNESS: 0
WOUND: 1
DIARRHEA: 0
FEVER: 0
PALPITATIONS: 0
DYSPNEA ON EXERTION: 0
IRREGULAR HEARTBEAT: 0
SHORTNESS OF BREATH: 1
COUGH: 0
POLYDIPSIA: 0
ABDOMINAL PAIN: 0
CHILLS: 0
FATIGUE: 1
PND: 0
NAUSEA: 0
ORTHOPNEA: 1

## 2024-08-30 ASSESSMENT — PAIN SCALES - PAIN ASSESSMENT IN ADVANCED DEMENTIA (PAINAD): TOTALSCORE: MEDICATION (SEE MAR)

## 2024-08-30 ASSESSMENT — PAIN SCALES - WONG BAKER: WONGBAKER_NUMERICALRESPONSE: HURTS LITTLE BIT

## 2024-08-30 NOTE — H&P
History Of Present Illness  Melecio Whaley is a 88 y.o. male with a pertinent hx of diastolic CHF, heart disease (noted on ECHO 7/9/2021), moderate aortic stenosis (ECHO 12/23), atrial fibrillation on Coumadin, hyperlipidemia, hypertension, obstructive sleep apnea, anxiety/depression & respiratory failure (on 3 L NC at baseline) who presented to [facility] ER with son at bedside due to complaints of audible wheezing that has been noted over the past week, 20 pound weight gain over the past month that was clearly documented in nursing home records. He was noted to be 289 pounds on July 17th and is currently 309 pounds. Son also says he has gained 2.8 pounds in the past day. He has bilateral lower extremity pitting edema on exam.  Son at the bedside says that he was previously on 20 mg of Lasix a day and was recently increased over the past 2 weeks to 80 mg a day.  Son says he has been possibly more fatigued.  He is currently wearing 2 L nasal cannula which he has been wearing for months and been connected with a pulmonologist outpatient.  He has not been prescribed oxygen, but is using the oxygen concentrator his wife. He has yet to be diagnosed with COPD or asthma. He was noted at the nursing facility to be on a regular diet without a fluid restriction.  He denied urinary symptoms, chest pain, fever, chills, cough and says his shortness of breath is only worse with exertion.  No recent falls and no complaints of pain.  Most review of systems and history was obtained from son at bedside since patient is not the best historian.  Pertinent workup in the ER included: Corrected calcium 8.0, albumin 3.0, , troponin 43, 44 and 45 without any new ST changes on EKG, INR 2.8, no leukocytosis, positive eosinophilia and neutrophilia, COVID-negative, UA bland and chest x-ray without any acute findings, + cardiomegaly.     Past Medical History  He has a past medical history of diastolic CHF (congestive heart failure)  (CMS/HCC), Depression, Heart disease, and atrial fib and on coumadin, neuropathy, NDE and refuses CPAP, anxiety/depression, CAD, HPLD, dyspnea, cognitive impairment, HTN.     Surgical History  Cardiac ablation.      Social History  He reports that he has never smoked. He has never used smokeless tobacco. He reports that he does not drink alcohol and does not use drugs.  He resides [facility] at [facility].  He uses a rollator.     Family History  Family History   Problem Relation Name Age of Onset    Coronary artery disease Neg Hx      Stroke Neg Hx       Allergies  Latex    Review of Systems   Constitutional:  Positive for fatigue. Negative for chills and fever.   HENT:  Negative for sinus pressure and sneezing.         +[place]   Eyes:  Negative for redness and itching.   Respiratory:  Positive for shortness of breath. Negative for cough.         Increased SOB with exertion   Cardiovascular:  Positive for leg swelling. Negative for chest pain and palpitations.   Gastrointestinal:  Negative for abdominal pain, constipation, diarrhea, nausea and vomiting.   Endocrine: Negative for polydipsia and polyuria.   Genitourinary:  Negative for dysuria and urgency.   Musculoskeletal:  Negative for arthralgias and myalgias.   Skin:  Positive for pallor and wound.        + sacral wound and scabbed areas to LE   Neurological:  Negative for dizziness and headaches.   Psychiatric/Behavioral:  Negative for agitation, behavioral problems and confusion.      Physical Exam  Vitals reviewed.   Constitutional:       Appearance: He is obese.   HENT:      Head: Normocephalic and atraumatic.      Mouth/Throat:      Mouth: Mucous membranes are moist.      Pharynx: Oropharynx is clear.   Eyes:      Extraocular Movements: Extraocular movements intact.      Conjunctiva/sclera: Conjunctivae normal.   Cardiovascular:      Rate and Rhythm: Normal rate. Rhythm irregular.      Heart sounds: No murmur heard.  Pulmonary:      Effort: Pulmonary  effort is normal.      Breath sounds: Wheezing present.      Comments: On 2 L NC, + audible wheezes  Abdominal:      General: Bowel sounds are normal. There is distension.      Palpations: Abdomen is soft.      Tenderness: There is no abdominal tenderness. There is no right CVA tenderness, left CVA tenderness, guarding or rebound.   Musculoskeletal:         General: Swelling present.      Right lower leg: Edema present.      Left lower leg: Edema present.      Comments: Generalized weakness and can barely lift lower legs off the bed, + 2 pitting edema BLE   Skin:     General: Skin is warm and dry.      Coloration: Skin is pale.      Comments: +wound to sacrum on admission, + left leg with scabbed area with erythema and increased warmth   Neurological:      General: No focal deficit present.      Mental Status: He is alert and oriented to person, place, and time. Mental status is at baseline.   Psychiatric:         Mood and Affect: Mood normal.         Behavior: Behavior normal.       Last Recorded Vitals  BP (!) 158/92   Pulse 61   Temp 36.3 °C (97.3 °F) (Temporal)   Resp 20   Wt 140 kg (309 lb 3.2 oz)   SpO2 100%     Relevant Results  Scheduled medications  acetaminophen, 975 mg, oral, BID  aspirin, 81 mg, oral, Daily  atorvastatin, 40 mg, oral, Nightly  calcium carbonate-vitamin D3, 1 tablet, oral, Daily  cetirizine, 10 mg, oral, Daily  citalopram, 40 mg, oral, Daily  cyanocobalamin, 1,000 mcg, oral, Daily  docusate sodium, 100 mg, oral, BID  furosemide, 40 mg, intravenous, q12h  gabapentin, 300 mg, oral, BID  ipratropium-albuteroL, 3 mL, nebulization, TID  polyethylene glycol, 17 g, oral, Daily  potassium chloride CR, 20 mEq, oral, BID  warfarin, 3 mg, oral, Daily      Continuous medications     PRN medications  PRN medications: albuterol, ammonium lactate, menthol-zinc oxide, oxygen      XR chest 1 view    Result Date: 8/29/2024  INDICATION: Chest pain. COMPARISON: None Available. TECHNIQUE: AP chest, 23:16  hours. FINDINGS: The cardiac silhouette is enlarged.  The mediastinum is without masses..  The lungs are without infiltrates, masses or pulmonary vascular congestion.  No pneumothorax or pleural effusion are seen. No acute osseous changes.    Cardiomegaly without active pulmonary disease. Signed by Lester Shore MD     Results for orders placed or performed during the hospital encounter of 08/29/24 (from the past 24 hour(s))   CBC and Auto Differential   Result Value Ref Range    WBC 11.2 4.4 - 11.3 x10*3/uL    nRBC 0.0 0.0 - 0.0 /100 WBCs    RBC 3.93 (L) 4.50 - 5.90 x10*6/uL    Hemoglobin 11.5 (L) 13.5 - 17.5 g/dL    Hematocrit 37.2 (L) 41.0 - 52.0 %    MCV 95 80 - 100 fL    MCH 29.3 26.0 - 34.0 pg    MCHC 30.9 (L) 32.0 - 36.0 g/dL    RDW 16.5 (H) 11.5 - 14.5 %    Platelets 239 150 - 450 x10*3/uL    Neutrophils % 76.2 40.0 - 80.0 %    Immature Granulocytes %, Automated 0.4 0.0 - 0.9 %    Lymphocytes % 8.5 13.0 - 44.0 %    Monocytes % 8.4 2.0 - 10.0 %    Eosinophils % 6.0 0.0 - 6.0 %    Basophils % 0.5 0.0 - 2.0 %    Neutrophils Absolute 8.53 (H) 1.60 - 5.50 x10*3/uL    Immature Granulocytes Absolute, Automated 0.05 0.00 - 0.50 x10*3/uL    Lymphocytes Absolute 0.95 0.80 - 3.00 x10*3/uL    Monocytes Absolute 0.94 (H) 0.05 - 0.80 x10*3/uL    Eosinophils Absolute 0.67 (H) 0.00 - 0.40 x10*3/uL    Basophils Absolute 0.06 0.00 - 0.10 x10*3/uL   Comprehensive Metabolic Panel   Result Value Ref Range    Glucose 86 74 - 99 mg/dL    Sodium 141 136 - 145 mmol/L    Potassium 4.8 3.5 - 5.3 mmol/L    Chloride 101 98 - 107 mmol/L    Bicarbonate 33 (H) 21 - 32 mmol/L    Anion Gap 12 10 - 20 mmol/L    Urea Nitrogen 32 (H) 6 - 23 mg/dL    Creatinine 1.17 0.50 - 1.30 mg/dL    eGFR 61 >60 mL/min/1.73m*2    Calcium 7.7 (L) 8.6 - 10.3 mg/dL    Albumin 3.0 (L) 3.4 - 5.0 g/dL    Alkaline Phosphatase 57 33 - 136 U/L    Total Protein 6.2 (L) 6.4 - 8.2 g/dL    AST 21 9 - 39 U/L    Bilirubin, Total 0.5 0.0 - 1.2 mg/dL    ALT 12 10 - 52 U/L    Magnesium   Result Value Ref Range    Magnesium 2.37 1.60 - 2.40 mg/dL   Sars-CoV-2 PCR   Result Value Ref Range    Coronavirus 2019, PCR Not Detected Not Detected   Troponin I, High Sensitivity, Initial   Result Value Ref Range    Troponin I, High Sensitivity 43 (H) 0 - 20 ng/L   B-type natriuretic peptide   Result Value Ref Range     (H) 0 - 99 pg/mL   Protime-INR   Result Value Ref Range    Protime 31.5 (H) 9.8 - 12.8 seconds    INR 2.8 (H) 0.9 - 1.1   Troponin, High Sensitivity, 1 Hour   Result Value Ref Range    Troponin I, High Sensitivity 44 (H) 0 - 20 ng/L         Assessment/Plan   Assessment & Plan  Congestive heart failure, unspecified HF chronicity, unspecified heart failure type (Multi)    Diastolic CHF with Exacerbation/HX Atrial Fib/Moderate Aortic Stenosis  ECHO-Dec 2023 showed normal systolic function, EF 60%, diastolic impairment not assessed, ECHO 7/2021 showed impaired diastolic function  Cardiac diet (no fluid restriction or cardiac diet at [facility])/daily wt  Cardiology Consulted-appreciate recs  Lasix 40 mg IV BID, significant output in ER with IV lasix. Monitor kidney function  Added 20 meq potassium BID  Pulse ox/tele  May need further evaluation for moderate aortic stenosis on ECHO from 12/23      Leg Edema/Hypoalbuminemia  -Contributing to leg swelling too  Continue lasix  Marcelo hose    Troponin Elevated Not d/t MI/CAD  -Troponin  43, 44  EKG unchanged and no new ST changes, denies chest pain  Continue aspirin, statin  No need for further workup    Suspect Underlying COPD/Asthma/Wheezing  -Still needs workup for dx  -Requiring 2-3 L NC oxygen (baseline) for months and couldn't complete a 6 min walk study  + Eosinophilia  -Audible Wheezing on exam  -His wife smoked for years   -COVID neg, chest xray showed cardiomegaly and no active pulmonary disease, no evidence for masses infiltrate or vascular congestion.  Duonebs, albuterol  May need steroid added if not improved, but is on baseline  "oxygen so not suspecting exacerbation at this time  Continue loratadine  RT consulted-appreciate recs  Will need PFT's when not in an acute exacerbation; Pulmonary documented \"Defer PFTs at present he is very [place]. If improvement with albuterol will consider maintenance therapy on follow up.\"     Mild Cellulitis to Left LE  -Scabbed area with increased erythema and warmth  Ancef Q 8  MRSA swab-f/U    Generalized Weakness  PT consult    Bilateral Sacral/Buttock Pressure Ulcer   Wound care  Zinc paste  Lac Hydrin for BLE    Corrected Calcium   -8.0     Depression/Anxiety  Celexa resumed     Neuropathy/B12 Deficiency  Neurontin, B 12 replacements     Full Code  Confirmed with son at bedside    DVT PX  Coumadin resumed  Monitor INR, 2.8  SCDs    Fatoumata Lebron, APRN-CNP  65 min spent interviewing and assessing patient, updating care plan, updating family on care plan, placing admission orders, updating MAR and reviewing chart/labs/dx/reviewing old pulmonary and cardiology notes.  "

## 2024-08-30 NOTE — ED PROVIDER NOTES
HPI   No chief complaint on file.      This is an 88-year-old male coming in for increased shortness of breath.  He states that despite having increased Lasix he is having increased weight gain and a hard time breathing.  He does wear oxygen at home and has been increasing his oxygen from 2 to 3 L and also reports he has been wearing it more frequently.  Patient denies any chest pain.  He has had increased lower extremity edema.  He has had slight cough.  He denies any fevers or chills.  He has not had any vomiting or diarrhea.  Patient reports that he has gained 5 pounds in the last week however over the last 2 days he has gained 3 pounds.  He is unable to lay flat due to orthopnea and shortness of breath that occurs with that.  Patient has had increased Lasix as he noted takes 40 mg daily every other day twice daily but has been taking it daily now with no alleviation.      History provided by:  Patient and EMS personnel          Patient History   Past Medical History:   Diagnosis Date    CHF (congestive heart failure) (Multi)     Depression     Heart disease     Personal history of other diseases of the nervous system and sense organs     History of neuropathy     Past Surgical History:   Procedure Laterality Date    OTHER SURGICAL HISTORY  06/16/2021    Cataract surgery    OTHER SURGICAL HISTORY  06/16/2021    Back surgery    OTHER SURGICAL HISTORY  06/16/2021    Prostate biopsy    OTHER SURGICAL HISTORY  06/16/2021    Knee surgery    OTHER SURGICAL HISTORY  06/16/2021    Skin tag removal     Family History   Problem Relation Name Age of Onset    Coronary artery disease Neg Hx      Stroke Neg Hx       Social History     Tobacco Use    Smoking status: Never    Smokeless tobacco: Never   Vaping Use    Vaping status: Never Used    Passive vaping exposure: Yes   Substance Use Topics    Alcohol use: Never    Drug use: Never       Physical Exam   ED Triage Vitals [08/29/24 2305]   Temperature Heart Rate Respirations  BP   36.3 °C (97.3 °F) 68 (!) 22 142/72      Pulse Ox Temp Source Heart Rate Source Patient Position   100 % Temporal -- --      BP Location FiO2 (%)     -- --       Physical Exam  Vitals and nursing note reviewed.   Constitutional:       General: He is not in acute distress.     Appearance: Normal appearance. He is not toxic-appearing.   HENT:      Head: Normocephalic and atraumatic.      Nose: Nose normal.      Mouth/Throat:      Mouth: Mucous membranes are moist.      Pharynx: Oropharynx is clear.   Eyes:      Extraocular Movements: Extraocular movements intact.      Conjunctiva/sclera: Conjunctivae normal.      Pupils: Pupils are equal, round, and reactive to light.   Cardiovascular:      Rate and Rhythm: Normal rate and regular rhythm.      Pulses: Normal pulses.      Heart sounds: Normal heart sounds.   Pulmonary:      Effort: Pulmonary effort is normal. No respiratory distress.      Breath sounds: Rales present. No wheezing.   Abdominal:      General: Abdomen is flat. Bowel sounds are normal.      Palpations: Abdomen is soft.      Tenderness: There is no abdominal tenderness.   Musculoskeletal:         General: Normal range of motion.      Cervical back: Normal range of motion and neck supple.      Right lower leg: 3+ Pitting Edema present.      Left lower leg: 3+ Pitting Edema present.   Skin:     General: Skin is warm and dry.      Coloration: Skin is not jaundiced or pale.      Findings: No bruising.   Neurological:      General: No focal deficit present.      Mental Status: He is alert and oriented to person, place, and time. Mental status is at baseline.   Psychiatric:         Mood and Affect: Mood normal.         Behavior: Behavior normal.           ED Course & MDM   ED Course as of 08/30/24 0031   Fri Aug 30, 2024   0008 Troponin I, High Sensitivity(!): 43 [WL]   0008 BNP(!): 312 [WL]   0008 INR(!): 2.8 [WL]   0008 Cxr shows IMPRESSION:  Cardiomegaly without active pulmonary disease.   [WL]   0008 This  is a 88-year-old male presenting chief complaint shortness of breath.  Has had weight gain over the past week and +3 pitting edema bilateral lower extremities.  Neurovascular intact.  Crackles in lung bases.  Plan will be for diuresis and is therapeutic on his Coumadin and due to his acute on chronic respiratory failure will need admission. [WL]   0020 EKG completed at 2311 shows on my interpretation atrial fibrillation.  Ventricular rate of 61 bpm.  No signs of STEMI.  QTc 461 ms. [RS]      ED Course User Index  [RS] Ryan Johnson PA-C  [WL] Jordi Braxton DO         Diagnoses as of 08/30/24 0031   Congestive heart failure, unspecified HF chronicity, unspecified heart failure type (Multi)   Acute on chronic respiratory failure with hypoxia (Multi)                 No data recorded                                 Medical Decision Making  Summary:  Medical Decision Making:   Patient presented as described in HPI. Patient case including ROS, PE, and treatment and plan discussed with ED attending if attached as cosigner. Results from labs and or imaging included below if completed. Melecio Whaley  is a 88 y.o. coming in for No chief complaint on file.  .  Due to patient's complaint lab work and imaging is completed.  Patient does have increased lower extremity edema.  He is given IV Lasix 40mg.  Patient's first troponin did come back elevated at 43.  Again he has no chest pain.  BNP is 312.  Chest x-ray does not show any fluid however patient does appear fluid overloaded on exam.  He will likely need hospitalization as he is having worsening symptoms.      Shared decision making was completed for required hospital stay. I also explained the plan and treatment course. Patient/guardian is in agreement with plan, treatment course, and state that they will comply.    Labs Reviewed  CBC WITH AUTO DIFFERENTIAL - Abnormal     WBC                           11.2                   nRBC                          0.0                     RBC                           3.93 (*)               Hemoglobin                    11.5 (*)               Hematocrit                    37.2 (*)               MCV                           95                     MCH                           29.3                   MCHC                          30.9 (*)               RDW                           16.5 (*)               Platelets                     239                    Neutrophils %                 76.2                   Immature Granulocytes %, Automated   0.4                    Lymphocytes %                 8.5                    Monocytes %                   8.4                    Eosinophils %                 6.0                    Basophils %                   0.5                    Neutrophils Absolute          8.53 (*)               Immature Granulocytes Absolute, Au*   0.05                   Lymphocytes Absolute          0.95                   Monocytes Absolute            0.94 (*)               Eosinophils Absolute          0.67 (*)               Basophils Absolute            0.06                COMPREHENSIVE METABOLIC PANEL - Abnormal     Glucose                       86                     Sodium                        141                    Potassium                     4.8                    Chloride                      101                    Bicarbonate                   33 (*)                 Anion Gap                     12                     Urea Nitrogen                 32 (*)                 Creatinine                    1.17                   eGFR                          61                     Calcium                       7.7 (*)                Albumin                       3.0 (*)                Alkaline Phosphatase          57                     Total Protein                 6.2 (*)                AST                           21                     Bilirubin, Total              0.5                    ALT                            12                  SERIAL TROPONIN-INITIAL - Abnormal     Troponin I, High Sensitivity   43 (*)                   Narrative: Less than 99th percentile of normal range cutoff-                Female and children under 18 years old <14 ng/L; Male <21 ng/L: Negative                Repeat testing should be performed if clinically indicated.                                 Female and children under 18 years old 14-50 ng/L; Male 21-50 ng/L:                Consistent with possible cardiac damage and possible increased clinical                 risk. Serial measurements may help to assess extent of myocardial damage.                                 >50 ng/L: Consistent with cardiac damage, increased clinical risk and                myocardial infarction. Serial measurements may help assess extent of                 myocardial damage.                                  NOTE: Children less than 1 year old may have higher baseline troponin                 levels and results should be interpreted in conjunction with the overall                 clinical context.                                 NOTE: Troponin I testing is performed using a different                 testing methodology at [facility] than at other                 [facility]. Direct result comparisons should only                 be made within the same method.  B-TYPE NATRIURETIC PEPTIDE - Abnormal     BNP                           312 (*)                  Narrative:    <100 pg/mL - Heart failure unlikely                100-299 pg/mL - Intermediate probability of acute heart                                failure exacerbation. Correlate with clinical                                context and patient history.                  >=300 pg/mL - Heart Failure likely. Correlate with clinical                                context and patient history.                                BNP testing is performed using different testing methodology at [facility] than at other [facility] hospitals. Direct result comparisons should only be made within the same method.                   PROTIME-INR - Abnormal     Protime                       31.5 (*)               INR                           2.8 (*)             MAGNESIUM - Normal     Magnesium                     2.37                SARS-COV-2 PCR - Normal     Coronavirus 2019, PCR                                  Narrative: This assay has received FDA Emergency Use Authorization (EUA) and is only authorized for the duration of time that circumstances exist to justify the authorization of the emergency use of in vitro diagnostic tests for the detection of SARS-CoV-2 virus and/or diagnosis of COVID-19 infection under section 564(b)(1) of the Act, 21 U.S.C. 360bbb-3(b)(1). This assay is an in vitro diagnostic nucleic acid amplification test for the qualitative detection of SARS-CoV-2 from nasopharyngeal specimens and has been validated for use at [facility]. Negative results do not preclude COVID-19 infections and should not be used as the sole basis for diagnosis, treatment, or other management decisions.                  TROPONIN SERIES- (INITIAL, 1 HR)       Narrative: The following orders were created for panel order Troponin I Series, High Sensitivity (0, 1 HR).                Procedure                               Abnormality         Status                                   ---------                               -----------         ------                                   Troponin I, High Sensiti...[[id number]]  Abnormal            Final result                             Troponin, High Sensitivi...[[id number]]                      In process                                               Please view results for these tests on the individual orders.  SERIAL TROPONIN, 1 HOUR   XR chest 1 view   Final Result    Cardiomegaly without active pulmonary disease.    Signed by Lester Shore  MD                            Tests/Medications/Escalations of Care considered but not given: As in MDM    Patient care discussed with: N/A  Social Determinants affecting care: N/A    Final diagnosis and disposition as documented          Shared decision making was completed and determined that patient will be hospitalized. I discussed the differential; results and admit plan with the patient and/or family/friend/caregiver if present.  I emphasized the importance of hospitalization need for re-evaluation/continued monitoring/interventions. They agreed that if they feel their condition is worsening or if they have any other concern they should alert staff immediately for further assistance. I gave the patient an opportunity to ask all questions they had and answered all of them accordingly. The patient and/or family/friend/caregiver expressed understanding verbally and that they would comply.    Disposition:  Hospitalize to tele floor under Dr. Reyes per their orders. Discussed findings and treatment done here in ED with admitting physician. It would be a risk to discharge the patient in their condition due to possibility of deterioration in their condition and the need for urgent interventions.    This note has been transcribed using voice recognition and may contain grammatical errors, misplaced words, incorrect words, incorrect phrases or other errors.         Procedure  Procedures     Ryan Johnson PA-C  08/30/24 0031

## 2024-08-30 NOTE — PROGRESS NOTES
"   08/30/24 0952   Discharge Planning   Living Arrangements Spouse/significant other;Other (Comment)   Support Systems Spouse/significant other;Children   Assistance Needed patient resides at [facility] [place] at the [facility] with his wife, MAURY&Ox2-3, [place], reports staff assists with ADL's, reports using a walker and wearing O2 at \"the home\" (note previously said he uses his wifes concentrator)   Type of Residence Assisted living   Do you have animals or pets at home? No   Care Facility Name [facility]   Who is requesting discharge planning? Provider   Home or Post Acute Services None   Type of Post Acute Facility Services Assisted living   [facility] Discharge Disposition [facility]   Does the patient need discharge transport arranged? Yes   RoundTrip coordination needed? Yes   Has discharge transport been arranged? No       "

## 2024-08-30 NOTE — NURSING NOTE
0130: assumed care of patient. Arrived to unit pleasant and cooperative. Patient is hard of hearing and experiencing an increased work of breathing. Reinforced male external catheter and reapplied 2L NC oxygen. Patient is resting comfortably, will continue to monitor.

## 2024-08-30 NOTE — CARE PLAN
The patient's goals for the shift include  Patient's SOB will decrease by adod.     The clinical goals for the shift include Pt will remain safe and comfortable throughout this shift

## 2024-08-30 NOTE — CONSULTS
Inpatient consult to Cardiology  Consult performed by: Yajaira Campos DO  Consult ordered by: Fatoumata Lebron, APRN-CNP    Reason For Consult  CHF exacerbation vs Mod aortic stenosis    History Of Present Illness  Melecio Whaley is a 88 y.o. male with PMH of diastolic CHF (60% EF TTE 12/8/23), moderate aortic stenosis, atrial fibrillation (Coumadin), hyperlipidemia, hypertension, obstructive sleep apnea, COPD (2-3 L NC baseline) who presented with complaints of audible wheezing for 1 week, 20 pound weight gain over the past month (nursing home records).     On today's encounter, patient reports increasing shortness of breath.  He reports recently that his home dose of Lasix was doubled doubled to 80 Mg per day, but patient reports increasing fatigue, weight gain, and shortness of breath despite this. He denies a diagnosis of COPD/asthma.  Patient stays in a nursing facility and reports his dry weight being 250-270 LBS.  He is currently comfortable on 2 L NC and denies shortness of breath.       Denies PND, chest pain, palpitations, lightheadedness, past MI.  Admits orthopnea    Past Medical History  He has a past medical history of CHF (congestive heart failure) (Multi), Depression, Heart disease, and Personal history of other diseases of the nervous system and sense organs.    Surgical History  He has a past surgical history that includes Other surgical history (06/16/2021); Other surgical history (06/16/2021); Other surgical history (06/16/2021); Other surgical history (06/16/2021); and Other surgical history (06/16/2021).     Social History  He reports that he has never smoked. He has never used smokeless tobacco. He reports that he does not drink alcohol and does not use drugs.    Family History  Family History   Problem Relation Name Age of Onset    Coronary artery disease Neg Hx      Stroke Neg Hx          Allergies  Latex    Review of Systems  Review of Systems   Cardiovascular:  Positive for leg swelling  and orthopnea. Negative for chest pain, dyspnea on exertion, irregular heartbeat, palpitations and paroxysmal nocturnal dyspnea.        Physical Exam  Physical Exam  Constitutional:       General: He is not in acute distress.     Appearance: He is obese.   Cardiovascular:      Rate and Rhythm: Normal rate. Rhythm irregular.      Heart sounds:      No friction rub.   Pulmonary:      Effort: Pulmonary effort is normal. No respiratory distress.      Comments: Loud breath sounds bilaterally  Abdominal:      Palpations: Abdomen is soft.   Musculoskeletal:      Right lower leg: Edema present.      Left lower leg: Edema present.      Comments: + 2-3 Pitting Bilateral   Neurological:      Mental Status: He is alert.           Last Recorded Vitals  /70 (BP Location: Right arm, Patient Position: Lying)   Pulse 58   Temp 36.4 °C (97.5 °F) (Temporal)   Resp 22   Wt 141 kg (310 lb)   SpO2 99%     Relevant Results  ECG 12 lead    Result Date: 8/30/2024  Atrial fibrillation Right bundle branch block Inferior infarct , age undetermined Abnormal ECG When compared with ECG of 07-DEC-2023 19:51, Previous ECG has undetermined rhythm, needs review Inferior infarct is now Present T wave inversion now evident in Inferior leads T wave inversion more evident in Anterior leads Nonspecific T wave abnormality, improved in Lateral leads    XR chest 1 view    Result Date: 8/29/2024  INDICATION: Chest pain. COMPARISON: None Available. TECHNIQUE: AP chest, 23:16 hours. FINDINGS: The cardiac silhouette is enlarged.  The mediastinum is without masses..  The lungs are without infiltrates, masses or pulmonary vascular congestion.  No pneumothorax or pleural effusion are seen. No acute osseous changes.    Cardiomegaly without active pulmonary disease. Signed by Lester Shore MD          Assessment/Plan   Melceio Whaley is a 88 y.o. male with PMH of diastolic CHF (60% EF TTE 12/8/23), moderate aortic stenosis, atrial fibrillation  "(Coumadin), hyperlipidemia, hypertension, obstructive sleep apnea, anxiety/depression & respiratory failure (3 L NC baseline) who presented with complaints of audible wheezing for 1 week, 20 pound weight gain over the past month (nursing home records).     #Diastolic HF w/ Preserved EF  #Afib/Aflutter  #Moderate Aortic Stenosis  -Patient reports gaining a significant amount of weight in the last few days. States his last estimated \"dry weight\" is between 250-270  -TTE pending. Last TTE 12/8/23: EF 60% w/ increased LV septal thickness.   -, Trop 43, 44, 45  -Strict I&O  -Daily weights  -Continue diuresis as tolerated:    -80 IV Lasix BID for 2 days   -After 2 days, transition to PO torsemide 40 daily  -Monitor diuresis, replete electrolytes  -Recommend outpatient follow up for aortic stenosis  -Cardiology to sign off    Yajaira Campos DO, PGY-1  Internal Medicine   8/30/24 at 9:00 AM.       Disclaimer: Documentation completed with the information available at the time of input. The times in the chart may not be reflective of actual patient care times, interventions, or procedures. Documentation occurs after the physical care of the patient.  "

## 2024-08-30 NOTE — ED PROCEDURE NOTE
Procedure  Critical Care    Performed by: Jordi Braxton DO  Authorized by: Jordi Braxton DO    Critical care provider statement:     Critical care time (minutes):  31    Critical care time was exclusive of:  Separately billable procedures and treating other patients and teaching time    Critical care was necessary to treat or prevent imminent or life-threatening deterioration of the following conditions:  Respiratory failure (CHF exacerbation)    Critical care was time spent personally by me on the following activities:  Ordering and performing treatments and interventions, ordering and review of laboratory studies, ordering and review of radiographic studies, pulse oximetry, re-evaluation of patient's condition, review of old charts, examination of patient, evaluation of patient's response to treatment, development of treatment plan with patient or surrogate and obtaining history from patient or surrogate    Care discussed with: admitting provider                 oJrdi Braxton DO  08/30/24 0553

## 2024-08-30 NOTE — PROGRESS NOTES
Physical Therapy                 Therapy Communication Note    Patient Name: Melecio Whaley  MRN: [id number]  Today's Date: 8/30/2024     Discipline: Physical Therapy    Missed Visit Reason: Patient sleeping, Patient refused. Pt soundly sleeping on arrival to room. Awakens slowly due to hearing impairment. Noted labored breathing while resting in bed. Vitals assessed: HR 56 bpm, SpO2 100% on 2L O2. Pt politely declining PT at this time 2/2 fatigue. No PT eval this date. RN notified.     Missed Time: Attempt    Comment:  Pt is resident from [facility] the [facility]. Pt reports ambulating with ww for short distances only, otherwise, staff assists him via wheelchair. Pt reports using the wheelchair more often lately. Staff also assists with all ADLs.

## 2024-08-30 NOTE — CARE PLAN
The patient's goals for the shift include pt will remain comfortable and have a decreased work of breathing for the rest of the shift.     The clinical goals for the shift include Pt will remain safe and comfortable throughout this shift

## 2024-08-30 NOTE — CONSULTS
"  Wound Care Consult     Visit Date: 8/30/2024      Patient Name: Melecio Whaley         MRN: [id number]           YOB: 1935     Reason for Consult:  Skin changes present on admission        Wound History:   Patient's family reports chronic, intermittent BLE edema and that patient \"bangs\" legs against his wheelchair at times.     Pertinent Labs:   Albumin   Date Value Ref Range Status   08/29/2024 3.0 (L) 3.4 - 5.0 g/dL Final       Wound Assessment:  Wound 08/30/24 Pressure Injury Sacrum (Active)   Wound Image   08/30/24 0129   Site Assessment Pink 08/30/24 0905   Donna-Wound Assessment Clean;Intact 08/30/24 0905   Drainage Description None 08/30/24 0905   Drainage Amount None 08/30/24 0905   Dressing Foam 08/30/24 0905   Dressing Changed Reinforced 08/30/24 0905       Wound 08/30/24 Abrasion Pretibial Left;Proximal (Active)   Wound Image   08/30/24 0130   Drainage Description None 08/30/24 0905   Drainage Amount None 08/30/24 0905   Dressing Open to air 08/30/24 0905       Wound 08/30/24 Abrasion Pretibial Right (Active)   Wound Image   08/30/24 0131   Drainage Description None 08/30/24 0905   Drainage Amount None 08/30/24 0905   Dressing Open to air 08/30/24 0905       Wound Team Summary Assessment:   Pleasant 89 y/o CM ws admitted with c/o wheezing, CHF.   He was observed to have small completely dry sanguineous scabs scattered to BLE, no drainage, redness, open wounds, blistering or weeping of skin.  These can be left YAJAIRA with elevation of legs with 2 pillows each, reports cannot tolerate leg wraps or anything on legs.  Heels pink, blanchable and off-loaded with pillows as he refuses other interventions.  His RN reports skin to buttock light pink, blanchable, intact.       Wound Team Plan:   Elevate legs and off-load heels.     Jose Marino RN  8/30/2024  3:08 PM        "

## 2024-08-31 LAB
ALBUMIN SERPL BCP-MCNC: 3.3 G/DL (ref 3.4–5)
ANION GAP SERPL CALC-SCNC: 10 MMOL/L (ref 10–20)
BUN SERPL-MCNC: 34 MG/DL (ref 6–23)
CALCIUM SERPL-MCNC: 8.3 MG/DL (ref 8.6–10.3)
CHLORIDE SERPL-SCNC: 99 MMOL/L (ref 98–107)
CO2 SERPL-SCNC: 34 MMOL/L (ref 21–32)
CREAT SERPL-MCNC: 1.29 MG/DL (ref 0.5–1.3)
EGFRCR SERPLBLD CKD-EPI 2021: 54 ML/MIN/1.73M*2
ERYTHROCYTE [DISTWIDTH] IN BLOOD BY AUTOMATED COUNT: 16.5 % (ref 11.5–14.5)
GLUCOSE SERPL-MCNC: 87 MG/DL (ref 74–99)
HCT VFR BLD AUTO: 38.2 % (ref 41–52)
HGB BLD-MCNC: 11.8 G/DL (ref 13.5–17.5)
INR PPP: 2.6 (ref 0.9–1.1)
MCH RBC QN AUTO: 28.6 PG (ref 26–34)
MCHC RBC AUTO-ENTMCNC: 30.9 G/DL (ref 32–36)
MCV RBC AUTO: 93 FL (ref 80–100)
NRBC BLD-RTO: 0 /100 WBCS (ref 0–0)
PHOSPHATE SERPL-MCNC: 3.8 MG/DL (ref 2.5–4.9)
PLATELET # BLD AUTO: 243 X10*3/UL (ref 150–450)
POTASSIUM SERPL-SCNC: 4.3 MMOL/L (ref 3.5–5.3)
PROTHROMBIN TIME: 29.6 SECONDS (ref 9.8–12.8)
RBC # BLD AUTO: 4.13 X10*6/UL (ref 4.5–5.9)
SODIUM SERPL-SCNC: 139 MMOL/L (ref 136–145)
WBC # BLD AUTO: 11.2 X10*3/UL (ref 4.4–11.3)

## 2024-08-31 PROCEDURE — 2500000002 HC RX 250 W HCPCS SELF ADMINISTERED DRUGS (ALT 637 FOR MEDICARE OP, ALT 636 FOR OP/ED): Performed by: NURSE PRACTITIONER

## 2024-08-31 PROCEDURE — 85027 COMPLETE CBC AUTOMATED: CPT | Performed by: FAMILY MEDICINE

## 2024-08-31 PROCEDURE — 2500000001 HC RX 250 WO HCPCS SELF ADMINISTERED DRUGS (ALT 637 FOR MEDICARE OP): Performed by: NURSE PRACTITIONER

## 2024-08-31 PROCEDURE — 2500000004 HC RX 250 GENERAL PHARMACY W/ HCPCS (ALT 636 FOR OP/ED)

## 2024-08-31 PROCEDURE — 85610 PROTHROMBIN TIME: CPT | Performed by: FAMILY MEDICINE

## 2024-08-31 PROCEDURE — 36415 COLL VENOUS BLD VENIPUNCTURE: CPT | Performed by: FAMILY MEDICINE

## 2024-08-31 PROCEDURE — 2500000004 HC RX 250 GENERAL PHARMACY W/ HCPCS (ALT 636 FOR OP/ED): Mod: JZ | Performed by: NURSE PRACTITIONER

## 2024-08-31 PROCEDURE — 94760 N-INVAS EAR/PLS OXIMETRY 1: CPT

## 2024-08-31 PROCEDURE — 80069 RENAL FUNCTION PANEL: CPT | Performed by: FAMILY MEDICINE

## 2024-08-31 PROCEDURE — 94640 AIRWAY INHALATION TREATMENT: CPT

## 2024-08-31 PROCEDURE — 1200000002 HC GENERAL ROOM WITH TELEMETRY DAILY

## 2024-08-31 PROCEDURE — 2500000002 HC RX 250 W HCPCS SELF ADMINISTERED DRUGS (ALT 637 FOR MEDICARE OP, ALT 636 FOR OP/ED): Performed by: INTERNAL MEDICINE

## 2024-08-31 PROCEDURE — 99233 SBSQ HOSP IP/OBS HIGH 50: CPT | Performed by: FAMILY MEDICINE

## 2024-08-31 PROCEDURE — 2500000004 HC RX 250 GENERAL PHARMACY W/ HCPCS (ALT 636 FOR OP/ED): Performed by: FAMILY MEDICINE

## 2024-08-31 PROCEDURE — 2500000005 HC RX 250 GENERAL PHARMACY W/O HCPCS: Performed by: NURSE PRACTITIONER

## 2024-08-31 RX ORDER — FUROSEMIDE 10 MG/ML
80 INJECTION INTRAMUSCULAR; INTRAVENOUS EVERY 12 HOURS
Status: DISCONTINUED | OUTPATIENT
Start: 2024-08-31 | End: 2024-09-01

## 2024-08-31 RX ADMIN — IPRATROPIUM BROMIDE AND ALBUTEROL SULFATE 3 ML: .5; 3 SOLUTION RESPIRATORY (INHALATION) at 19:42

## 2024-08-31 RX ADMIN — Medication 2 L/MIN: at 14:29

## 2024-08-31 RX ADMIN — ACETAMINOPHEN 975 MG: 325 TABLET ORAL at 21:09

## 2024-08-31 RX ADMIN — Medication 2 L/MIN: at 19:42

## 2024-08-31 RX ADMIN — DOCUSATE SODIUM 100 MG: 100 CAPSULE, LIQUID FILLED ORAL at 09:34

## 2024-08-31 RX ADMIN — GABAPENTIN 300 MG: 300 CAPSULE ORAL at 09:34

## 2024-08-31 RX ADMIN — FUROSEMIDE 80 MG: 10 INJECTION, SOLUTION INTRAMUSCULAR; INTRAVENOUS at 18:27

## 2024-08-31 RX ADMIN — BENZONATATE 100 MG: 100 CAPSULE ORAL at 18:38

## 2024-08-31 RX ADMIN — POTASSIUM CHLORIDE 20 MEQ: 1500 TABLET, EXTENDED RELEASE ORAL at 09:33

## 2024-08-31 RX ADMIN — FUROSEMIDE 80 MG: 10 INJECTION, SOLUTION INTRAVENOUS at 04:42

## 2024-08-31 RX ADMIN — Medication 2 L/MIN: at 09:20

## 2024-08-31 RX ADMIN — Medication 1000 MCG: at 09:34

## 2024-08-31 RX ADMIN — CITALOPRAM HYDROBROMIDE 40 MG: 20 TABLET ORAL at 09:34

## 2024-08-31 RX ADMIN — CEFAZOLIN SODIUM 1 G: 1 INJECTION, SOLUTION INTRAVENOUS at 03:24

## 2024-08-31 RX ADMIN — POLYETHYLENE GLYCOL 3350 17 G: 17 POWDER, FOR SOLUTION ORAL at 09:34

## 2024-08-31 RX ADMIN — WARFARIN SODIUM 3 MG: 3 TABLET ORAL at 18:27

## 2024-08-31 RX ADMIN — ACETAMINOPHEN 975 MG: 325 TABLET ORAL at 09:33

## 2024-08-31 RX ADMIN — Medication 1 TABLET: at 09:33

## 2024-08-31 RX ADMIN — ASPIRIN 81 MG: 81 TABLET, COATED ORAL at 09:34

## 2024-08-31 RX ADMIN — CETIRIZINE HYDROCHLORIDE 10 MG: 10 TABLET, FILM COATED ORAL at 09:34

## 2024-08-31 RX ADMIN — IPRATROPIUM BROMIDE AND ALBUTEROL SULFATE 3 ML: .5; 3 SOLUTION RESPIRATORY (INHALATION) at 09:17

## 2024-08-31 RX ADMIN — DOCUSATE SODIUM 100 MG: 100 CAPSULE, LIQUID FILLED ORAL at 21:09

## 2024-08-31 RX ADMIN — GABAPENTIN 300 MG: 300 CAPSULE ORAL at 21:09

## 2024-08-31 RX ADMIN — ATORVASTATIN CALCIUM 40 MG: 40 TABLET, FILM COATED ORAL at 21:08

## 2024-08-31 RX ADMIN — POTASSIUM CHLORIDE 20 MEQ: 1500 TABLET, EXTENDED RELEASE ORAL at 21:08

## 2024-08-31 RX ADMIN — IPRATROPIUM BROMIDE AND ALBUTEROL SULFATE 3 ML: .5; 3 SOLUTION RESPIRATORY (INHALATION) at 14:29

## 2024-08-31 ASSESSMENT — COGNITIVE AND FUNCTIONAL STATUS - GENERAL
DAILY ACTIVITIY SCORE: 15
TURNING FROM BACK TO SIDE WHILE IN FLAT BAD: A LOT
DRESSING REGULAR UPPER BODY CLOTHING: A LOT
HELP NEEDED FOR BATHING: A LOT
DAILY ACTIVITIY SCORE: 15
CLIMB 3 TO 5 STEPS WITH RAILING: TOTAL
DRESSING REGULAR UPPER BODY CLOTHING: A LOT
TOILETING: A LOT
DRESSING REGULAR LOWER BODY CLOTHING: A LOT
MOVING FROM LYING ON BACK TO SITTING ON SIDE OF FLAT BED WITH BEDRAILS: A LOT
MOBILITY SCORE: 9
HELP NEEDED FOR BATHING: A LOT
MOVING TO AND FROM BED TO CHAIR: A LOT
TOILETING: A LOT
PERSONAL GROOMING: A LITTLE
WALKING IN HOSPITAL ROOM: TOTAL
MOVING FROM LYING ON BACK TO SITTING ON SIDE OF FLAT BED WITH BEDRAILS: A LOT
WALKING IN HOSPITAL ROOM: TOTAL
MOBILITY SCORE: 9
STANDING UP FROM CHAIR USING ARMS: TOTAL
TURNING FROM BACK TO SIDE WHILE IN FLAT BAD: A LOT
PERSONAL GROOMING: A LITTLE
DRESSING REGULAR LOWER BODY CLOTHING: A LOT
STANDING UP FROM CHAIR USING ARMS: TOTAL
CLIMB 3 TO 5 STEPS WITH RAILING: TOTAL
MOVING TO AND FROM BED TO CHAIR: A LOT

## 2024-08-31 ASSESSMENT — PAIN - FUNCTIONAL ASSESSMENT: PAIN_FUNCTIONAL_ASSESSMENT: 0-10

## 2024-08-31 ASSESSMENT — PAIN SCALES - GENERAL
PAINLEVEL_OUTOF10: 0 - NO PAIN
PAINLEVEL_OUTOF10: 0 - NO PAIN

## 2024-08-31 NOTE — CARE PLAN
Problem: Pain - Adult  Goal: Verbalizes/displays adequate comfort level or baseline comfort level  8/31/2024 1843 by Maria L Jain RN  Outcome: Progressing  8/31/2024 1841 by Maria L Jain RN  Outcome: Progressing     Problem: Safety - Adult  Goal: Free from fall injury  8/31/2024 1843 by Maria L Jain RN  Outcome: Progressing  8/31/2024 1841 by Maria L Jain RN  Outcome: Progressing     Problem: Discharge Planning  Goal: Discharge to home or other facility with appropriate resources  8/31/2024 1843 by Maria L Jain RN  Outcome: Progressing  8/31/2024 1841 by Maria L Jain RN  Outcome: Progressing     Problem: Chronic Conditions and Co-morbidities  Goal: Patient's chronic conditions and co-morbidity symptoms are monitored and maintained or improved  8/31/2024 1843 by Maria L Jain RN  Outcome: Progressing  8/31/2024 1841 by Maria L Jain RN  Outcome: Progressing     Problem: Skin  Goal: Prevent/manage excess moisture  8/31/2024 1843 by Maria L Jain RN  Outcome: Progressing  8/31/2024 1841 by Maria L Jain RN  Outcome: Progressing  Goal: Prevent/minimize sheer/friction injuries  8/31/2024 1843 by Maria L Jain RN  Outcome: Progressing  8/31/2024 1841 by Maria L Jain RN  Outcome: Progressing  Goal: Promote/optimize nutrition  Outcome: Progressing  Goal: Promote skin healing  Outcome: Progressing     Problem: Heart Failure  Goal: Improved urinary output this shift  Outcome: Progressing  Goal: Reduction in peripheral edema within 24 hours  Outcome: Progressing   The patient's goals for the shift include      The clinical goals for the shift include patient will follow fluid restriction through day    Patient had 850 ml in po intake today.

## 2024-08-31 NOTE — PROGRESS NOTES
Jean Claude Allen is a 88 y.o. male on day 1 of admission presenting with Congestive heart failure, unspecified HF chronicity, unspecified heart failure type (Multi).      Subjective   No acute events overnight       Objective     Last Recorded Vitals  /73 (BP Location: Right arm, Patient Position: Lying)   Pulse 68   Temp 36.6 °C (97.9 °F) (Temporal)   Resp 20   Wt 140 kg (309 lb 9.6 oz)   SpO2 99%   Intake/Output last 3 Shifts:    Intake/Output Summary (Last 24 hours) at 8/31/2024 0855  Last data filed at 8/31/2024 0437  Gross per 24 hour   Intake 820 ml   Output 3100 ml   Net -2280 ml       Admission Weight  Weight: 140 kg (309 lb 3.2 oz) (08/29/24 2305)    Daily Weight  08/31/24 : 140 kg (309 lb 9.6 oz)    Image Results  Transthoracic Echo (TTE) [facility], [address] [place] [address]                Tel [phone] and Fax [phone]    TRANSTHORACIC ECHOCARDIOGRAM REPORT       Patient Name:      JEAN CLAUDE ALLEN     Reading Physician:    [id number] Burton Muniz MD  Study Date:        8/30/2024            Ordering Provider:    [id number] DUC AGARWAL  MRN/PID:           [id number]             Fellow:  Accession#:        [id number]         Nurse:                Milagros Santos RN  Date of Birth/Age: 1935 / 88      Sonographer:          Riri beverly                                      [facility]  Gender:            M                    Additional Staff:  Height:            185.42 cm            Admit Date:           8/29/2024  Weight:            140.61 kg            Admission Status:     Inpatient -                                                                Routine  BSA / BMI:         2.59 m2 / 40.90      Encounter#:           [id number]                     kg/m2  Blood Pressure:    164/70 mmHg          Department  Location:  [facility] Non                                                                Invasive    Study Type:    TRANSTHORACIC ECHO (TTE) COMPLETE  Diagnosis/ICD: Shortness of breath-R06.02  Indication:    Dyspnea  CPT Code:      Echo Complete w Full Doppler-17205    Patient History:  Pertinent History: CHF, Atrial flutter, HTN and Hyperlipidemia.    Study Detail: The following Echo studies were performed: 2D, M-Mode, Doppler and                color flow. Definity used as a contrast agent for endocardial                border definition. Total contrast used for this procedure was 2.0                mL via IV push. The patient was awake.       PHYSICIAN INTERPRETATION:  Left Ventricle: The left ventricular systolic function is normal, with a visually estimated ejection fraction of 55-60%. There are no regional left ventricular wall motion abnormalities. The left ventricular cavity size is normal. There is moderate concentric left ventricular hypertrophy. Spectral Doppler shows a restrictive pattern of left ventricular diastolic filling.  Left Atrium: The left atrium is severely dilated.  Right Ventricle: The right ventricle is normal in size. There is mildly reduced right ventricular systolic function.  Right Atrium: The right atrium was not well visualized.  Aortic Valve: The aortic valve is trileaflet. There is moderate aortic valve cusp calcification. There is evidence of moderate aortic valve stenosis. The aortic valve dimensionless index is 0.26. There is no evidence of aortic valve regurgitation. The peak instantaneous gradient of the aortic valve is 30.0 mmHg. The mean gradient of the aortic valve is 16.7 mmHg. Valve planimetry 1.4cm2.  Mitral Valve: The mitral valve is mild to moderately thickened. There is mild to moderate mitral annular calcification. There is trace to mild mitral valve regurgitation.  Tricuspid Valve: The tricuspid valve is structurally normal. There is mild tricuspid regurgitation.  The Doppler estimated RVSP is mildly elevated at 47.3 mmHg.  Pulmonic Valve: The pulmonic valve is not well visualized. There is physiologic pulmonic valve regurgitation.  Pericardium: There is no pericardial effusion noted.  Aorta: The aortic root is normal. The aortic root is at the upper limits of normal size.  Systemic Veins: The inferior vena cava appears dilated. There is IVC inspiratory collapse greater than 50%.       CONCLUSIONS:   1. The left ventricular systolic function is normal, with a visually estimated ejection fraction of 55-60%.   2. Spectral Doppler shows a restrictive pattern of left ventricular diastolic filling.   3. There is moderate concentric left ventricular hypertrophy.   4. There is mildly reduced right ventricular systolic function.   5. Mildly elevated RVSP.   6. Moderate aortic valve stenosis.   7. The left atrium is severely dilated.    QUANTITATIVE DATA SUMMARY:  2D MEASUREMENTS:                          Normal Ranges:  IVSd:          1.65 cm  (0.6-1.1cm)  LVPWd:         1.64 cm  (0.6-1.1cm)  LVIDd:         4.37 cm  (3.9-5.9cm)  LVIDs:         3.59 cm  LV Mass Index: 129 g/m2  LVEDV Index:   35 ml/m2  LV % FS        17.9 %    LA VOLUME:                                Normal Ranges:  LA Vol A4C:        125.7 ml   (22+/-6mL/m2)  LA Vol A2C:        120.9 ml  LA Vol BP:         129.5 ml  LA Vol Index A4C:  48.5ml/m2  LA Vol Index A2C:  46.6 ml/m2  LA Vol Index BP:   49.9 ml/m2  LA Area A4C:       33.3 cm2  LA Area A2C:       31.1 cm2  LA Major Axis A4C: 7.5 cm  LA Major Axis A2C: 6.8 cm  LA Volume Index:   49.9 ml/m2  LA Vol A4C:        118.4 ml  LA Vol A2C:        116.3 ml  LA Vol Index BSA:  45.3 ml/m2    RA VOLUME BY A/L METHOD:                        Normal Ranges:  RA Area A4C: 27.9 cm2    M-MODE MEASUREMENTS:                   Normal Ranges:  Ao Root: 3.90 cm (2.0-3.7cm)  AoV Exc: 0.87 cm (1.5-2.5cm)  LAs:     4.86 cm (2.7-4.0cm)    AORTA MEASUREMENTS:                       Normal  Ranges:  AoV Exc:     0.87 cm (1.5-2.5cm)  Ao Sinus, d: 3.50 cm (2.1-3.5cm)  Asc Ao, d:   4.00 cm (2.1-3.4cm)    LV SYSTOLIC FUNCTION BY 2D PLANIMETRY (MOD):                       Normal Ranges:  EF-A4C View:    56 % (>=55%)  EF-A2C View:    51 %  EF-Biplane:     55 %  EF-Visual:      58 %  LV EF Reported: 58 %    LV DIASTOLIC FUNCTION:                              Normal Ranges:  MV Peak E:      1.01 m/s    (0.7-1.2 m/s)  MV Peak A:      0.42 m/s    (0.42-0.7 m/s)  E/A Ratio:      2.40        (1.0-2.2)  MV e'           0.050 m/s   (>8.0)  MV lateral e'   0.06 m/s  MV medial e'    0.04 m/s  MV A Dur:       169.55 msec  E/e' Ratio:     20.12       (<8.0)  PulmV Sys Rico:  16.78 cm/s  PulmV Mo Rico: 35.05 cm/s  PulmV S/D Rico:  0.48    MITRAL VALVE:                  Normal Ranges:  MV DT: 273 msec (150-240msec)    AORTIC VALVE:                                     Normal Ranges:  AoV Vmax:                2.74 m/s  (<=1.7m/s)  AoV Peak P.0 mmHg (<20mmHg)  AoV Mean P.7 mmHg (1.7-11.5mmHg)  LVOT Max Rico:            1.00 m/s  (<=1.1m/s)  AoV VTI:                 82.21 cm  (18-25cm)  LVOT VTI:                21.44 cm  LVOT Diameter:           2.15 cm   (1.8-2.4cm)  AoV Area, VTI:           0.94 cm2  (2.5-5.5cm2)  AoV Area,Vmax:           1.32 cm2  (2.5-4.5cm2)  AoV Dimensionless Index: 0.26       RIGHT VENTRICLE:  RV Basal 4.10 cm  RV Mid   3.40 cm  RV Major 8.9 cm  TAPSE:   16.0 mm  RV s'    0.06 m/s    TRICUSPID VALVE/RVSP:                              Normal Ranges:  Peak TR Velocity: 3.14 m/s  RV Syst Pressure: 47.3 mmHg (< 30mmHg)  IVC Diam:         2.59 cm    PULMONIC VALVE:                       Normal Ranges:  PV Max Rico: 0.8 m/s  (0.6-0.9m/s)  PV Max P.3 mmHg    Pulmonary Veins:  PulmV Mo Rico: 35.05 cm/s  PulmV S/D Rico:  0.48  PulmV Sys Rico:  16.78 cm/s    AORTA:  Asc Ao Diam 3.96 cm       [id number] Burton Muniz MD  Electronically signed on 2024 at 4:14:24 PM       ** Final  **  ECG 12 lead  Atrial fibrillation  Low voltage QRS  Right bundle branch block  Inferior infarct , age undetermined  Abnormal ECG  When compared with ECG of 07-DEC-2023 19:51,  Previous ECG has undetermined rhythm, needs review  T wave inversion now evident in Inferior leads  Nonspecific T wave abnormality no longer evident in Lateral leads  ECG 12 lead  Atrial fibrillation  Right bundle branch block  Inferior infarct , age undetermined  Abnormal ECG  When compared with ECG of 07-DEC-2023 19:51,  Previous ECG has undetermined rhythm, needs review  Inferior infarct is now Present  T wave inversion now evident in Inferior leads  T wave inversion more evident in Anterior leads  Nonspecific T wave abnormality, improved in Lateral leads      Physical Exam    Gen: lying comfortably in bed, not in acute distress, obese  HEENT: atraumatic, normocephalic  Pulm: normal respiratory effort, clear to auscultation b/l  Cardiac: Irregularly irregular   GI: Soft, nontender, BS+  MSK: normal ROM without joint swelling  Extremities: no LE edema, cyanosis, excoriations over both extremities no apparent cellulitis  Neuro: AOX3, CN II-XII grossly intact, equal b/l strength, no loss in sensation   Psych: calm and appropriate for situation        Assessment/Plan      Melecio Whaley is a 88 y.o. male with a pertinent hx of diastolic CHF, heart disease (noted on ECHO 7/9/2021), moderate aortic stenosis (ECHO 12/23), atrial fibrillation on Coumadin, hyperlipidemia, hypertension, obstructive sleep apnea, anxiety/depression & respiratory failure (on 3 L NC at baseline) who presented due to shortness of breath and a 20 pound weight gain over the past month.    Acute on chronic diastolic heart failure/moderate aortic stenosis  Echo 12/2023 revealed LVEF of 60%, diastolic impairment  Continue Lasix 80 mg IV twice daily for another day with plans to transition to torsemide 40 mg daily per cardiology  Cardiology has signed off  Net output  [id number] since admission    Mild troponin elevation likely due to demand ischemia  Likely due to above  EKG with atrial fibrillation and right bundle branch block  Right bundle branch block is present on prior EKGs, prior EKGs reveal atrial flutter   Cardiology signed off  Continue aspirin and statin    Atrial fibrillation  Rate controlled without medication  Continue warfarin    Aortic stenosis  To follow-up with cardiology as an outpatient    Cellulitis ruled out  Discontinue Ancef    Debility  PT consulted however patient refused  Patient is a resident of [facility] living [facility] reports he is able to ambulate short distances only and staff assist him with ADLs    Sacral wounds  Continue wound care    Depression/anxiety  Continue Celexa    CODE STATUS  Full code as discussed with patient and son    DVT prophylaxis  Continue Coumadin      Peter Bautista, DO

## 2024-08-31 NOTE — PROGRESS NOTES
"Physical Therapy                 Therapy Communication Note / PT order CXL    Patient Name: Melecio Whaley  MRN: [id number]  Today's Date: 8/31/2024     Discipline: Physical Therapy    Missed Visit Reason: Missed Visit Reason: Cancel    Missed Time: Cancel 1241    Comment: Pt adamant on not working with Physical therapy, stating multiple times \"I am not interested\". Stated he does not want therapy returning. Physician aware, PT order canceled at this time.   "

## 2024-09-01 VITALS
HEIGHT: 73 IN | SYSTOLIC BLOOD PRESSURE: 148 MMHG | HEART RATE: 69 BPM | RESPIRATION RATE: 22 BRPM | OXYGEN SATURATION: 98 % | WEIGHT: 301.5 LBS | DIASTOLIC BLOOD PRESSURE: 80 MMHG | TEMPERATURE: 98.5 F | BODY MASS INDEX: 39.96 KG/M2

## 2024-09-01 PROBLEM — I50.9 CONGESTIVE HEART FAILURE, UNSPECIFIED HF CHRONICITY, UNSPECIFIED HEART FAILURE TYPE (MULTI): Status: RESOLVED | Noted: 2024-08-30 | Resolved: 2024-09-01

## 2024-09-01 LAB
ALBUMIN SERPL BCP-MCNC: 3.4 G/DL (ref 3.4–5)
ANION GAP SERPL CALC-SCNC: 11 MMOL/L (ref 10–20)
BUN SERPL-MCNC: 37 MG/DL (ref 6–23)
CALCIUM SERPL-MCNC: 8.6 MG/DL (ref 8.6–10.3)
CHLORIDE SERPL-SCNC: 98 MMOL/L (ref 98–107)
CO2 SERPL-SCNC: 37 MMOL/L (ref 21–32)
CREAT SERPL-MCNC: 1.28 MG/DL (ref 0.5–1.3)
EGFRCR SERPLBLD CKD-EPI 2021: 55 ML/MIN/1.73M*2
ERYTHROCYTE [DISTWIDTH] IN BLOOD BY AUTOMATED COUNT: 16.5 % (ref 11.5–14.5)
GLUCOSE SERPL-MCNC: 86 MG/DL (ref 74–99)
HCT VFR BLD AUTO: 40.1 % (ref 41–52)
HGB BLD-MCNC: 12.5 G/DL (ref 13.5–17.5)
INR PPP: 2.5 (ref 0.9–1.1)
MCH RBC QN AUTO: 28.7 PG (ref 26–34)
MCHC RBC AUTO-ENTMCNC: 31.2 G/DL (ref 32–36)
MCV RBC AUTO: 92 FL (ref 80–100)
NRBC BLD-RTO: 0 /100 WBCS (ref 0–0)
PHOSPHATE SERPL-MCNC: 3.6 MG/DL (ref 2.5–4.9)
PLATELET # BLD AUTO: 239 X10*3/UL (ref 150–450)
POTASSIUM SERPL-SCNC: 4.6 MMOL/L (ref 3.5–5.3)
PROTHROMBIN TIME: 28.9 SECONDS (ref 9.8–12.8)
RBC # BLD AUTO: 4.35 X10*6/UL (ref 4.5–5.9)
SODIUM SERPL-SCNC: 141 MMOL/L (ref 136–145)
STAPHYLOCOCCUS SPEC CULT: NORMAL
WBC # BLD AUTO: 12.2 X10*3/UL (ref 4.4–11.3)

## 2024-09-01 PROCEDURE — 2500000002 HC RX 250 W HCPCS SELF ADMINISTERED DRUGS (ALT 637 FOR MEDICARE OP, ALT 636 FOR OP/ED): Performed by: INTERNAL MEDICINE

## 2024-09-01 PROCEDURE — 2500000004 HC RX 250 GENERAL PHARMACY W/ HCPCS (ALT 636 FOR OP/ED): Performed by: NURSE PRACTITIONER

## 2024-09-01 PROCEDURE — 36415 COLL VENOUS BLD VENIPUNCTURE: CPT | Performed by: FAMILY MEDICINE

## 2024-09-01 PROCEDURE — 94760 N-INVAS EAR/PLS OXIMETRY 1: CPT

## 2024-09-01 PROCEDURE — 85610 PROTHROMBIN TIME: CPT | Performed by: FAMILY MEDICINE

## 2024-09-01 PROCEDURE — 2500000004 HC RX 250 GENERAL PHARMACY W/ HCPCS (ALT 636 FOR OP/ED): Performed by: INTERNAL MEDICINE

## 2024-09-01 PROCEDURE — 2500000001 HC RX 250 WO HCPCS SELF ADMINISTERED DRUGS (ALT 637 FOR MEDICARE OP): Performed by: NURSE PRACTITIONER

## 2024-09-01 PROCEDURE — 2500000004 HC RX 250 GENERAL PHARMACY W/ HCPCS (ALT 636 FOR OP/ED): Performed by: FAMILY MEDICINE

## 2024-09-01 PROCEDURE — 99239 HOSP IP/OBS DSCHRG MGMT >30: CPT | Performed by: NURSE PRACTITIONER

## 2024-09-01 PROCEDURE — 2500000002 HC RX 250 W HCPCS SELF ADMINISTERED DRUGS (ALT 637 FOR MEDICARE OP, ALT 636 FOR OP/ED): Performed by: NURSE PRACTITIONER

## 2024-09-01 PROCEDURE — 84100 ASSAY OF PHOSPHORUS: CPT | Performed by: FAMILY MEDICINE

## 2024-09-01 PROCEDURE — 2500000005 HC RX 250 GENERAL PHARMACY W/O HCPCS: Performed by: NURSE PRACTITIONER

## 2024-09-01 PROCEDURE — 85027 COMPLETE CBC AUTOMATED: CPT | Performed by: FAMILY MEDICINE

## 2024-09-01 PROCEDURE — 94640 AIRWAY INHALATION TREATMENT: CPT

## 2024-09-01 RX ORDER — POTASSIUM CHLORIDE 20 MEQ/1
20 TABLET, EXTENDED RELEASE ORAL 2 TIMES DAILY
Start: 2024-09-01 | End: 2024-10-01

## 2024-09-01 RX ORDER — GUAIFENESIN 600 MG/1
600 TABLET, EXTENDED RELEASE ORAL 2 TIMES DAILY PRN
Status: DISCONTINUED | OUTPATIENT
Start: 2024-09-01 | End: 2024-09-01 | Stop reason: HOSPADM

## 2024-09-01 RX ORDER — TORSEMIDE 20 MG/1
40 TABLET ORAL DAILY
Status: DISCONTINUED | OUTPATIENT
Start: 2024-09-01 | End: 2024-09-01 | Stop reason: HOSPADM

## 2024-09-01 RX ADMIN — BENZONATATE 100 MG: 100 CAPSULE ORAL at 08:42

## 2024-09-01 RX ADMIN — GUAIFENESIN 600 MG: 600 TABLET ORAL at 03:06

## 2024-09-01 RX ADMIN — CITALOPRAM HYDROBROMIDE 40 MG: 20 TABLET ORAL at 08:42

## 2024-09-01 RX ADMIN — POTASSIUM CHLORIDE 20 MEQ: 1500 TABLET, EXTENDED RELEASE ORAL at 08:47

## 2024-09-01 RX ADMIN — ASPIRIN 81 MG: 81 TABLET, COATED ORAL at 08:43

## 2024-09-01 RX ADMIN — GABAPENTIN 300 MG: 300 CAPSULE ORAL at 08:42

## 2024-09-01 RX ADMIN — ACETAMINOPHEN 975 MG: 325 TABLET ORAL at 08:42

## 2024-09-01 RX ADMIN — IPRATROPIUM BROMIDE AND ALBUTEROL SULFATE 3 ML: .5; 3 SOLUTION RESPIRATORY (INHALATION) at 07:50

## 2024-09-01 RX ADMIN — DOCUSATE SODIUM 100 MG: 100 CAPSULE, LIQUID FILLED ORAL at 08:42

## 2024-09-01 RX ADMIN — POLYETHYLENE GLYCOL 3350 17 G: 17 POWDER, FOR SOLUTION ORAL at 08:41

## 2024-09-01 RX ADMIN — CETIRIZINE HYDROCHLORIDE 10 MG: 10 TABLET, FILM COATED ORAL at 08:42

## 2024-09-01 RX ADMIN — Medication 1 TABLET: at 08:43

## 2024-09-01 RX ADMIN — TORSEMIDE 40 MG: 20 TABLET ORAL at 08:42

## 2024-09-01 RX ADMIN — Medication 2 L/MIN: at 07:50

## 2024-09-01 RX ADMIN — FUROSEMIDE 80 MG: 10 INJECTION, SOLUTION INTRAMUSCULAR; INTRAVENOUS at 06:04

## 2024-09-01 RX ADMIN — Medication 1000 MCG: at 08:42

## 2024-09-01 ASSESSMENT — COGNITIVE AND FUNCTIONAL STATUS - GENERAL
MOVING TO AND FROM BED TO CHAIR: A LOT
STANDING UP FROM CHAIR USING ARMS: A LOT
DRESSING REGULAR LOWER BODY CLOTHING: A LOT
MOVING FROM LYING ON BACK TO SITTING ON SIDE OF FLAT BED WITH BEDRAILS: A LOT
TOILETING: A LOT
TURNING FROM BACK TO SIDE WHILE IN FLAT BAD: A LOT
PERSONAL GROOMING: A LITTLE
DAILY ACTIVITIY SCORE: 15
HELP NEEDED FOR BATHING: A LOT
MOBILITY SCORE: 11
WALKING IN HOSPITAL ROOM: A LOT
CLIMB 3 TO 5 STEPS WITH RAILING: TOTAL
DRESSING REGULAR UPPER BODY CLOTHING: A LOT

## 2024-09-01 ASSESSMENT — PAIN SCALES - GENERAL: PAINLEVEL_OUTOF10: 0 - NO PAIN

## 2024-09-04 LAB
Q ONSET: 204 MS
QRS COUNT: 10 BEATS
QRS DURATION: 120 MS
QT INTERVAL: 458 MS
QTC CALCULATION(BAZETT): 461 MS
QTC FREDERICIA: 460 MS
R AXIS: 12 DEGREES
T AXIS: -19 DEGREES
T OFFSET: 433 MS
VENTRICULAR RATE: 61 BPM

## 2024-09-04 NOTE — DISCHARGE SUMMARY
Discharge Diagnosis  Congestive heart failure, unspecified HF chronicity, unspecified heart failure type (Multi)    Issues Requiring Follow-Up  Hospital follow-up    Discharge Meds     Medication List      START taking these medications     potassium chloride CR 20 mEq ER tablet; Commonly known as: Klor-Con M20;   Take 1 tablet (20 mEq) by mouth 2 times a day. Do not crush or chew.   torsemide 40 mg tablet; Take 40 mg by mouth once daily.     CONTINUE taking these medications     acetaminophen 500 mg tablet; Commonly known as: Tylenol; Take 2 tablets   (1,000 mg) by mouth 2 times a day.   albuterol 90 mcg/actuation inhaler; Inhale 2 puffs every 4 hours if   needed for shortness of breath.   ammonium lactate 12 % cream; Commonly known as: Amlactin   aspirin 81 mg EC tablet; Take 1 tablet (81 mg) by mouth once daily.   atorvastatin 40 mg tablet; Commonly known as: Lipitor; Take 1 tablet (40   mg) by mouth once daily at bedtime.   calcium carbonate-vitamin D3 500 mg-10 mcg (400 unit) chewable tablet   Calmoseptine 0.44-20.6 % ointment; Generic drug: menthol-zinc oxide   citalopram 40 mg tablet; Commonly known as: CeleXA; Take 1 tablet (40   mg) by mouth once daily.   cyanocobalamin 500 mcg tablet; Commonly known as: Vitamin B-12   gabapentin 300 mg capsule; Commonly known as: Neurontin; Take 1 capsule   (300 mg) by mouth 2 times a day.   loratadine 10 mg tablet; Commonly known as: Claritin; Take 1 tablet (10   mg) by mouth once daily.   polyethylene glycol 17 gram packet; Commonly known as: Glycolax,   Miralax; Take 17 g by mouth once daily.   warfarin 3 mg tablet; Commonly known as: Coumadin     STOP taking these medications     furosemide 80 mg tablet; Commonly known as: Lasix       Test Results Pending At Discharge  Pending Labs       No current pending labs.            Hospital Course   Melecio Whaley is a 88 y.o. male with a pertinent hx of diastolic CHF, heart disease (noted on ECHO 7/9/2021), moderate aortic  stenosis (ECHO 12/23), atrial fibrillation on Coumadin, hyperlipidemia, hypertension, obstructive sleep apnea, anxiety/depression & respiratory failure (on 3 L NC at baseline) who presented due to shortness of breath and a 20 pound weight gain over the past month.     #Acute on chronic diastolic heart failure/moderate aortic stenosis  Echo 12/2023 revealed LVEF of 60%, diastolic impairment  The patient was treated with IV Lasix for 2 days and transition to torsemide 40 mg daily  #Mild troponin elevation likely due to demand ischemia  Likely due to above  EKG with atrial fibrillation and right bundle branch block  Right bundle branch block is present on prior EKGs, prior EKGs reveal atrial flutter   Continue aspirin and statin  Patient discharged to [facility]    Pertinent Physical Exam At Time of Discharge  Gen: lying comfortably in bed, not in acute distress, obese  HEENT: atraumatic, normocephalic  Pulm: normal respiratory effort, clear to auscultation b/l  Cardiac: Irregularly irregular   GI: Soft, nontender, BS+  MSK: normal ROM without joint swelling  Extremities: no LE edema, cyanosis, excoriations over both extremities no apparent cellulitis  Neuro: AOX3, CN II-XII grossly intact, equal b/l strength, no loss in sensation   Psych: calm and appropriate for situation        Outpatient Follow-Up  Future Appointments   Date Time Provider Department Center   11/19/2024  2:00 PM MD LILLIAM Mooney1 Jordan Bennett APRN-CNP

## 2024-09-06 NOTE — SIGNIFICANT EVENT
Follow Up Phone Call    Outgoing phone call    Spoke to: Melecio Whaley Relationship:self   Phone number: [phone]      Outcome: Unable to contact patient. Left message with callback number.     Chief Complaint   Patient presents with   • Shortness of Breath          Diagnosis:Not applicable

## 2024-11-19 ENCOUNTER — APPOINTMENT (OUTPATIENT)
Dept: PULMONOLOGY | Facility: HOSPITAL | Age: 89
End: 2024-11-19
Payer: MEDICARE